# Patient Record
Sex: FEMALE | Race: WHITE | Employment: OTHER | URBAN - NONMETROPOLITAN AREA
[De-identification: names, ages, dates, MRNs, and addresses within clinical notes are randomized per-mention and may not be internally consistent; named-entity substitution may affect disease eponyms.]

---

## 2017-01-29 RX ORDER — POTASSIUM CHLORIDE 1500 MG/1
TABLET, EXTENDED RELEASE ORAL
Qty: 180 TABLET | Refills: 1 | Status: SHIPPED | OUTPATIENT
Start: 2017-01-29 | End: 2017-08-03 | Stop reason: SDUPTHER

## 2017-01-29 RX ORDER — POLYETHYLENE GLYCOL 3350 17 G/17G
POWDER, FOR SOLUTION ORAL
Qty: 1054 G | Refills: 1 | Status: SHIPPED | OUTPATIENT
Start: 2017-01-29 | End: 2017-09-01 | Stop reason: SDUPTHER

## 2017-03-16 ENCOUNTER — OFFICE VISIT (OUTPATIENT)
Dept: PRIMARY CARE CLINIC | Age: 77
End: 2017-03-16
Payer: MEDICARE

## 2017-03-16 VITALS
HEART RATE: 86 BPM | TEMPERATURE: 97 F | BODY MASS INDEX: 35 KG/M2 | WEIGHT: 205 LBS | OXYGEN SATURATION: 97 % | DIASTOLIC BLOOD PRESSURE: 64 MMHG | HEIGHT: 64 IN | SYSTOLIC BLOOD PRESSURE: 112 MMHG | RESPIRATION RATE: 20 BRPM

## 2017-03-16 DIAGNOSIS — Z23 NEED FOR PNEUMOCOCCAL VACCINATION: ICD-10-CM

## 2017-03-16 DIAGNOSIS — I10 ESSENTIAL HYPERTENSION: Primary | ICD-10-CM

## 2017-03-16 DIAGNOSIS — E78.5 HYPERLIPIDEMIA, UNSPECIFIED HYPERLIPIDEMIA TYPE: ICD-10-CM

## 2017-03-16 DIAGNOSIS — Z12.31 SCREENING MAMMOGRAM, ENCOUNTER FOR: ICD-10-CM

## 2017-03-16 PROCEDURE — 99214 OFFICE O/P EST MOD 30 MIN: CPT | Performed by: FAMILY MEDICINE

## 2017-03-24 PROBLEM — E78.5 HYPERLIPIDEMIA: Status: ACTIVE | Noted: 2017-03-24

## 2017-03-24 ASSESSMENT — ENCOUNTER SYMPTOMS
VOMITING: 0
WHEEZING: 0
COLOR CHANGE: 0
DIARRHEA: 0
COUGH: 0
BACK PAIN: 0
NAUSEA: 0
EYE DISCHARGE: 0
ABDOMINAL PAIN: 0

## 2017-04-03 RX ORDER — INDAPAMIDE 2.5 MG/1
TABLET, FILM COATED ORAL
Qty: 60 TABLET | Refills: 5 | Status: SHIPPED | OUTPATIENT
Start: 2017-04-03 | End: 2017-10-05 | Stop reason: SDUPTHER

## 2017-04-03 RX ORDER — CITALOPRAM 40 MG/1
TABLET ORAL
Qty: 90 TABLET | Refills: 0 | Status: SHIPPED | OUTPATIENT
Start: 2017-04-03 | End: 2017-05-03 | Stop reason: SDUPTHER

## 2017-04-03 RX ORDER — MELOXICAM 15 MG/1
TABLET ORAL
Qty: 90 TABLET | Refills: 2 | Status: SHIPPED | OUTPATIENT
Start: 2017-04-03 | End: 2017-08-03 | Stop reason: SDUPTHER

## 2017-05-03 DIAGNOSIS — F41.0 PANIC ATTACKS: ICD-10-CM

## 2017-05-03 RX ORDER — CITALOPRAM 40 MG/1
TABLET ORAL
Qty: 90 TABLET | Refills: 2 | Status: SHIPPED | OUTPATIENT
Start: 2017-05-03 | End: 2018-07-30 | Stop reason: SDUPTHER

## 2017-06-01 RX ORDER — ZOLPIDEM TARTRATE 10 MG/1
TABLET ORAL
Qty: 30 TABLET | Refills: 0 | Status: SHIPPED | OUTPATIENT
Start: 2017-06-01 | End: 2017-07-28 | Stop reason: SDUPTHER

## 2017-06-30 DIAGNOSIS — N39.41 URGE INCONTINENCE: ICD-10-CM

## 2017-06-30 RX ORDER — MIRABEGRON 50 MG/1
TABLET, FILM COATED, EXTENDED RELEASE ORAL
Qty: 30 TABLET | Refills: 3 | OUTPATIENT
Start: 2017-06-30

## 2017-07-05 DIAGNOSIS — J30.2 SEASONAL ALLERGIES: ICD-10-CM

## 2017-07-06 RX ORDER — FLUTICASONE PROPIONATE 50 MCG
SPRAY, SUSPENSION (ML) NASAL
Qty: 1 BOTTLE | Refills: 2 | Status: SHIPPED | OUTPATIENT
Start: 2017-07-06 | End: 2017-12-08 | Stop reason: SDUPTHER

## 2017-07-28 RX ORDER — ZOLPIDEM TARTRATE 10 MG/1
TABLET ORAL
Qty: 30 TABLET | Refills: 0 | Status: SHIPPED | OUTPATIENT
Start: 2017-07-28 | End: 2017-10-01 | Stop reason: SDUPTHER

## 2017-08-03 RX ORDER — MELOXICAM 15 MG/1
TABLET ORAL
Qty: 90 TABLET | Refills: 2 | Status: SHIPPED | OUTPATIENT
Start: 2017-08-03 | End: 2018-08-28 | Stop reason: SDUPTHER

## 2017-08-03 RX ORDER — POTASSIUM CHLORIDE 1500 MG/1
TABLET, EXTENDED RELEASE ORAL
Qty: 180 TABLET | Refills: 1 | Status: SHIPPED | OUTPATIENT
Start: 2017-08-03 | End: 2018-01-29 | Stop reason: SDUPTHER

## 2017-08-14 ENCOUNTER — NURSE ONLY (OUTPATIENT)
Dept: PRIMARY CARE CLINIC | Age: 77
End: 2017-08-14
Payer: MEDICARE

## 2017-08-14 DIAGNOSIS — L75.0 URINARY BODY ODOR: Primary | ICD-10-CM

## 2017-08-14 DIAGNOSIS — R35.0 FREQUENT URINATION: ICD-10-CM

## 2017-08-14 LAB
APPEARANCE FLUID: ABNORMAL
BILIRUBIN, POC: NEGATIVE
BLOOD URINE, POC: ABNORMAL
CLARITY, POC: ABNORMAL
COLOR, POC: ABNORMAL
GLUCOSE URINE, POC: NEGATIVE
KETONES, POC: NEGATIVE
LEUKOCYTE EST, POC: ABNORMAL
NITRITE, POC: POSITIVE
PH, POC: 6.5
PROTEIN, POC: NEGATIVE
SPECIFIC GRAVITY, POC: 1.01
UROBILINOGEN, POC: 0.2

## 2017-08-14 PROCEDURE — 81002 URINALYSIS NONAUTO W/O SCOPE: CPT | Performed by: FAMILY MEDICINE

## 2017-08-14 RX ORDER — CIPROFLOXACIN 500 MG/1
500 TABLET, FILM COATED ORAL 2 TIMES DAILY
Qty: 20 TABLET | Refills: 0 | Status: SHIPPED | OUTPATIENT
Start: 2017-08-14 | End: 2017-08-24

## 2017-08-16 LAB
ORGANISM: ABNORMAL
URINE CULTURE, ROUTINE: ABNORMAL
URINE CULTURE, ROUTINE: ABNORMAL

## 2017-09-01 RX ORDER — POLYETHYLENE GLYCOL 3350 17 G/17G
POWDER, FOR SOLUTION ORAL
Qty: 1054 G | Refills: 1 | Status: SHIPPED | OUTPATIENT
Start: 2017-09-01 | End: 2018-09-19 | Stop reason: SDUPTHER

## 2017-09-18 ENCOUNTER — OFFICE VISIT (OUTPATIENT)
Dept: PRIMARY CARE CLINIC | Age: 77
End: 2017-09-18
Payer: MEDICARE

## 2017-09-18 VITALS
TEMPERATURE: 96.6 F | WEIGHT: 198.6 LBS | DIASTOLIC BLOOD PRESSURE: 75 MMHG | HEIGHT: 63 IN | SYSTOLIC BLOOD PRESSURE: 139 MMHG | RESPIRATION RATE: 20 BRPM | BODY MASS INDEX: 35.19 KG/M2 | HEART RATE: 78 BPM

## 2017-09-18 DIAGNOSIS — R82.90 MALODOROUS URINE: ICD-10-CM

## 2017-09-18 DIAGNOSIS — I10 ESSENTIAL HYPERTENSION: Primary | ICD-10-CM

## 2017-09-18 DIAGNOSIS — R31.9 BLOOD IN URINE: ICD-10-CM

## 2017-09-18 DIAGNOSIS — L75.0 URINARY BODY ODOR: ICD-10-CM

## 2017-09-18 LAB
BILIRUBIN, POC: ABNORMAL
BLOOD URINE, POC: ABNORMAL
CLARITY, POC: ABNORMAL
COLOR, POC: ABNORMAL
GLUCOSE URINE, POC: ABNORMAL
KETONES, POC: ABNORMAL
LEUKOCYTE EST, POC: ABNORMAL
NITRITE, POC: POSITIVE
PH, POC: 5
PROTEIN, POC: ABNORMAL
SPECIFIC GRAVITY, POC: 1.02
UROBILINOGEN, POC: 0.2

## 2017-09-18 PROCEDURE — 81002 URINALYSIS NONAUTO W/O SCOPE: CPT | Performed by: FAMILY MEDICINE

## 2017-09-18 PROCEDURE — 99214 OFFICE O/P EST MOD 30 MIN: CPT | Performed by: FAMILY MEDICINE

## 2017-09-18 RX ORDER — CIPROFLOXACIN 500 MG/1
500 TABLET, FILM COATED ORAL 2 TIMES DAILY
Qty: 20 TABLET | Refills: 0 | Status: SHIPPED | OUTPATIENT
Start: 2017-09-18 | End: 2017-09-28

## 2017-09-18 ASSESSMENT — ENCOUNTER SYMPTOMS
WHEEZING: 0
DIARRHEA: 0
COUGH: 0
COLOR CHANGE: 0
BACK PAIN: 0
EYE DISCHARGE: 0
VOMITING: 0
ABDOMINAL PAIN: 0
NAUSEA: 0

## 2017-09-20 LAB
ORGANISM: ABNORMAL
URINE CULTURE, ROUTINE: ABNORMAL
URINE CULTURE, ROUTINE: ABNORMAL

## 2017-09-20 RX ORDER — NITROFURANTOIN 25; 75 MG/1; MG/1
100 CAPSULE ORAL 2 TIMES DAILY
Qty: 20 CAPSULE | Refills: 0 | Status: SHIPPED | OUTPATIENT
Start: 2017-09-20 | End: 2017-09-30

## 2017-10-02 RX ORDER — LOSARTAN POTASSIUM AND HYDROCHLOROTHIAZIDE 25; 100 MG/1; MG/1
TABLET ORAL
Qty: 90 TABLET | Refills: 3 | Status: SHIPPED | OUTPATIENT
Start: 2017-10-02 | End: 2018-09-29 | Stop reason: SDUPTHER

## 2017-10-02 RX ORDER — DULOXETIN HYDROCHLORIDE 60 MG/1
CAPSULE, DELAYED RELEASE ORAL
Qty: 90 CAPSULE | Refills: 4 | Status: SHIPPED | OUTPATIENT
Start: 2017-10-02 | End: 2018-12-31 | Stop reason: SDUPTHER

## 2017-10-02 RX ORDER — ZOLPIDEM TARTRATE 10 MG/1
TABLET ORAL
Qty: 30 TABLET | Refills: 0 | Status: SHIPPED | OUTPATIENT
Start: 2017-10-02 | End: 2017-12-01 | Stop reason: SDUPTHER

## 2017-10-04 ENCOUNTER — NURSE ONLY (OUTPATIENT)
Dept: PRIMARY CARE CLINIC | Age: 77
End: 2017-10-04
Payer: MEDICARE

## 2017-10-04 ENCOUNTER — TELEPHONE (OUTPATIENT)
Dept: PRIMARY CARE CLINIC | Age: 77
End: 2017-10-04

## 2017-10-04 DIAGNOSIS — Z09 FOLLOW-UP EXAM: Primary | ICD-10-CM

## 2017-10-04 DIAGNOSIS — F41.0 PANIC ATTACKS: ICD-10-CM

## 2017-10-04 LAB
BILIRUBIN, POC: NORMAL
BLOOD URINE, POC: NORMAL
CLARITY, POC: CLEAR
COLOR, POC: NORMAL
GLUCOSE URINE, POC: NORMAL
KETONES, POC: NORMAL
LEUKOCYTE EST, POC: NORMAL
NITRITE, POC: NORMAL
PH, POC: 6
PROTEIN, POC: NORMAL
SPECIFIC GRAVITY, POC: 1.01
UROBILINOGEN, POC: 0.2

## 2017-10-04 PROCEDURE — 81002 URINALYSIS NONAUTO W/O SCOPE: CPT | Performed by: FAMILY MEDICINE

## 2017-10-04 RX ORDER — ALPRAZOLAM 0.5 MG/1
TABLET ORAL
Qty: 90 TABLET | Refills: 0 | Status: SHIPPED | OUTPATIENT
Start: 2017-10-04 | End: 2018-05-31 | Stop reason: SDUPTHER

## 2017-10-04 NOTE — TELEPHONE ENCOUNTER
Called patient to let her know that her Xanax was called in but she would need to stop by the office to sign a medication agreement.

## 2017-10-04 NOTE — TELEPHONE ENCOUNTER
Patient called stating that she needs a refill on her Xanax. She states that her other meds were filled but this wasn't.

## 2017-10-05 RX ORDER — INDAPAMIDE 2.5 MG/1
2.5 TABLET, FILM COATED ORAL DAILY
Qty: 30 TABLET | Refills: 5 | Status: SHIPPED | OUTPATIENT
Start: 2017-10-05 | End: 2017-10-25 | Stop reason: SDUPTHER

## 2017-10-25 ENCOUNTER — TELEPHONE (OUTPATIENT)
Dept: PRIMARY CARE CLINIC | Age: 77
End: 2017-10-25

## 2017-10-25 RX ORDER — INDAPAMIDE 2.5 MG/1
2.5 TABLET, FILM COATED ORAL 2 TIMES DAILY
Qty: 60 TABLET | Refills: 3 | Status: SHIPPED | OUTPATIENT
Start: 2017-10-25 | End: 2018-01-05 | Stop reason: SDUPTHER

## 2017-12-01 DIAGNOSIS — G89.29 CHRONIC BACK PAIN: ICD-10-CM

## 2017-12-01 DIAGNOSIS — M54.9 CHRONIC BACK PAIN: ICD-10-CM

## 2017-12-01 RX ORDER — ZOLPIDEM TARTRATE 10 MG/1
TABLET ORAL
Qty: 30 TABLET | Refills: 0 | Status: SHIPPED | OUTPATIENT
Start: 2017-12-01 | End: 2018-01-29 | Stop reason: SDUPTHER

## 2017-12-04 RX ORDER — AMLODIPINE BESYLATE 10 MG/1
TABLET ORAL
Qty: 90 TABLET | Refills: 3 | Status: SHIPPED | OUTPATIENT
Start: 2017-12-04 | End: 2018-11-29 | Stop reason: SDUPTHER

## 2017-12-04 RX ORDER — LOVASTATIN 40 MG/1
TABLET ORAL
Qty: 90 TABLET | Refills: 3 | Status: SHIPPED | OUTPATIENT
Start: 2017-12-04 | End: 2018-03-06 | Stop reason: SDUPTHER

## 2017-12-08 RX ORDER — FLUTICASONE PROPIONATE 50 MCG
SPRAY, SUSPENSION (ML) NASAL
Qty: 1 BOTTLE | Refills: 2 | Status: SHIPPED | OUTPATIENT
Start: 2017-12-08 | End: 2018-12-29 | Stop reason: SDUPTHER

## 2018-01-08 RX ORDER — INDAPAMIDE 2.5 MG/1
2.5 TABLET, FILM COATED ORAL 2 TIMES DAILY
Qty: 180 TABLET | Refills: 3 | Status: SHIPPED | OUTPATIENT
Start: 2018-01-08 | End: 2019-02-22 | Stop reason: SDUPTHER

## 2018-01-29 RX ORDER — POTASSIUM CHLORIDE 1500 MG/1
TABLET, EXTENDED RELEASE ORAL
Qty: 180 TABLET | Refills: 1 | Status: SHIPPED | OUTPATIENT
Start: 2018-01-29 | End: 2018-07-29 | Stop reason: SDUPTHER

## 2018-01-29 RX ORDER — VERAPAMIL HYDROCHLORIDE 180 MG/1
CAPSULE, EXTENDED RELEASE ORAL
Qty: 180 CAPSULE | Refills: 2 | Status: SHIPPED | OUTPATIENT
Start: 2018-01-29 | End: 2018-08-17 | Stop reason: SDUPTHER

## 2018-01-29 RX ORDER — ZOLPIDEM TARTRATE 10 MG/1
TABLET ORAL
Qty: 30 TABLET | Refills: 0 | Status: SHIPPED | OUTPATIENT
Start: 2018-01-29 | End: 2018-02-28 | Stop reason: SDUPTHER

## 2018-02-28 RX ORDER — ZOLPIDEM TARTRATE 10 MG/1
TABLET ORAL
Qty: 30 TABLET | Refills: 0 | Status: SHIPPED | OUTPATIENT
Start: 2018-02-28 | End: 2018-04-02 | Stop reason: SDUPTHER

## 2018-03-06 RX ORDER — ATORVASTATIN CALCIUM 40 MG/1
40 TABLET, FILM COATED ORAL DAILY
Qty: 30 TABLET | Refills: 3 | Status: SHIPPED | OUTPATIENT
Start: 2018-03-06 | End: 2018-06-29 | Stop reason: SDUPTHER

## 2018-03-06 RX ORDER — LOVASTATIN 40 MG/1
TABLET ORAL
Qty: 90 TABLET | Refills: 3 | Status: SHIPPED | OUTPATIENT
Start: 2018-03-06 | End: 2018-03-06 | Stop reason: CLARIF

## 2018-03-19 ENCOUNTER — OFFICE VISIT (OUTPATIENT)
Dept: PRIMARY CARE CLINIC | Age: 78
End: 2018-03-19
Payer: MEDICARE

## 2018-03-19 VITALS
RESPIRATION RATE: 20 BRPM | HEART RATE: 78 BPM | TEMPERATURE: 96.1 F | DIASTOLIC BLOOD PRESSURE: 74 MMHG | BODY MASS INDEX: 35.79 KG/M2 | WEIGHT: 202 LBS | SYSTOLIC BLOOD PRESSURE: 136 MMHG | OXYGEN SATURATION: 98 % | HEIGHT: 63 IN

## 2018-03-19 DIAGNOSIS — I10 ESSENTIAL HYPERTENSION: Primary | ICD-10-CM

## 2018-03-19 DIAGNOSIS — F51.01 PRIMARY INSOMNIA: ICD-10-CM

## 2018-03-19 DIAGNOSIS — Z23 NEED FOR PNEUMOCOCCAL VACCINATION: ICD-10-CM

## 2018-03-19 DIAGNOSIS — R06.02 SHORTNESS OF BREATH ON EXERTION: ICD-10-CM

## 2018-03-19 PROCEDURE — G0009 ADMIN PNEUMOCOCCAL VACCINE: HCPCS | Performed by: FAMILY MEDICINE

## 2018-03-19 PROCEDURE — 1090F PRES/ABSN URINE INCON ASSESS: CPT | Performed by: FAMILY MEDICINE

## 2018-03-19 PROCEDURE — G8482 FLU IMMUNIZE ORDER/ADMIN: HCPCS | Performed by: FAMILY MEDICINE

## 2018-03-19 PROCEDURE — 4040F PNEUMOC VAC/ADMIN/RCVD: CPT | Performed by: FAMILY MEDICINE

## 2018-03-19 PROCEDURE — 1036F TOBACCO NON-USER: CPT | Performed by: FAMILY MEDICINE

## 2018-03-19 PROCEDURE — 90670 PCV13 VACCINE IM: CPT | Performed by: FAMILY MEDICINE

## 2018-03-19 PROCEDURE — G8417 CALC BMI ABV UP PARAM F/U: HCPCS | Performed by: FAMILY MEDICINE

## 2018-03-19 PROCEDURE — G8599 NO ASA/ANTIPLAT THER USE RNG: HCPCS | Performed by: FAMILY MEDICINE

## 2018-03-19 PROCEDURE — 99214 OFFICE O/P EST MOD 30 MIN: CPT | Performed by: FAMILY MEDICINE

## 2018-03-19 PROCEDURE — G8427 DOCREV CUR MEDS BY ELIG CLIN: HCPCS | Performed by: FAMILY MEDICINE

## 2018-03-19 PROCEDURE — 1123F ACP DISCUSS/DSCN MKR DOCD: CPT | Performed by: FAMILY MEDICINE

## 2018-03-19 PROCEDURE — G8399 PT W/DXA RESULTS DOCUMENT: HCPCS | Performed by: FAMILY MEDICINE

## 2018-03-19 RX ORDER — GABAPENTIN 100 MG/1
200 CAPSULE ORAL 2 TIMES DAILY
COMMUNITY

## 2018-03-19 ASSESSMENT — ENCOUNTER SYMPTOMS
BACK PAIN: 0
VOMITING: 0
EYE DISCHARGE: 0
WHEEZING: 0
ABDOMINAL PAIN: 0
NAUSEA: 0
DIARRHEA: 0
COLOR CHANGE: 0
COUGH: 0

## 2018-03-19 ASSESSMENT — PATIENT HEALTH QUESTIONNAIRE - PHQ9
1. LITTLE INTEREST OR PLEASURE IN DOING THINGS: 0
SUM OF ALL RESPONSES TO PHQ QUESTIONS 1-9: 0
2. FEELING DOWN, DEPRESSED OR HOPELESS: 0
SUM OF ALL RESPONSES TO PHQ9 QUESTIONS 1 & 2: 0

## 2018-03-19 NOTE — PROGRESS NOTES
conjugate vaccine 13-valent)    Shortness of breath on exertion        PLAN:    Continue current medications. Follow-up in 6 months for Medicare annual wellness and checkup unless needed sooner. Prevnar given in office today.

## 2018-03-28 ENCOUNTER — NURSE ONLY (OUTPATIENT)
Dept: PRIMARY CARE CLINIC | Age: 78
End: 2018-03-28
Payer: MEDICARE

## 2018-03-28 DIAGNOSIS — I25.119 CORONARY ARTERY DISEASE WITH ANGINA PECTORIS, UNSPECIFIED VESSEL OR LESION TYPE, UNSPECIFIED WHETHER NATIVE OR TRANSPLANTED HEART (HCC): ICD-10-CM

## 2018-03-28 DIAGNOSIS — I10 ESSENTIAL HYPERTENSION: ICD-10-CM

## 2018-03-28 DIAGNOSIS — E78.00 PURE HYPERCHOLESTEROLEMIA: Primary | ICD-10-CM

## 2018-03-28 LAB
ALBUMIN SERPL-MCNC: 3.9 G/DL (ref 3.5–5.2)
ALP BLD-CCNC: 71 U/L (ref 35–104)
ALT SERPL-CCNC: 9 U/L (ref 5–33)
ANION GAP SERPL CALCULATED.3IONS-SCNC: 14 MMOL/L (ref 7–19)
AST SERPL-CCNC: 14 U/L (ref 5–32)
BILIRUB SERPL-MCNC: 0.3 MG/DL (ref 0.2–1.2)
BUN BLDV-MCNC: 15 MG/DL (ref 8–23)
CALCIUM SERPL-MCNC: 9.6 MG/DL (ref 8.8–10.2)
CHLORIDE BLD-SCNC: 100 MMOL/L (ref 98–111)
CHOLESTEROL, TOTAL: 166 MG/DL (ref 160–199)
CO2: 26 MMOL/L (ref 22–29)
CREAT SERPL-MCNC: 0.5 MG/DL (ref 0.5–0.9)
GFR NON-AFRICAN AMERICAN: >60
GLUCOSE BLD-MCNC: 99 MG/DL (ref 74–109)
HCT VFR BLD CALC: 41.8 % (ref 37–47)
HDLC SERPL-MCNC: 70 MG/DL (ref 65–121)
HEMOGLOBIN: 13.5 G/DL (ref 12–16)
LDL CHOLESTEROL CALCULATED: 81 MG/DL
MCH RBC QN AUTO: 29 PG (ref 27–31)
MCHC RBC AUTO-ENTMCNC: 32.3 G/DL (ref 33–37)
MCV RBC AUTO: 89.7 FL (ref 81–99)
PDW BLD-RTO: 14.4 % (ref 11.5–14.5)
PLATELET # BLD: 360 K/UL (ref 130–400)
PMV BLD AUTO: 10 FL (ref 9.4–12.3)
POTASSIUM SERPL-SCNC: 4.2 MMOL/L (ref 3.5–5)
RBC # BLD: 4.66 M/UL (ref 4.2–5.4)
SODIUM BLD-SCNC: 140 MMOL/L (ref 136–145)
TOTAL PROTEIN: 6.8 G/DL (ref 6.6–8.7)
TRIGL SERPL-MCNC: 77 MG/DL (ref 0–149)
WBC # BLD: 5.4 K/UL (ref 4.8–10.8)

## 2018-03-28 PROCEDURE — 36415 COLL VENOUS BLD VENIPUNCTURE: CPT | Performed by: FAMILY MEDICINE

## 2018-04-02 RX ORDER — ZOLPIDEM TARTRATE 10 MG/1
TABLET ORAL
Qty: 30 TABLET | Refills: 0 | Status: SHIPPED | OUTPATIENT
Start: 2018-04-02 | End: 2018-04-30 | Stop reason: SDUPTHER

## 2018-04-30 RX ORDER — ZOLPIDEM TARTRATE 10 MG/1
TABLET ORAL
Qty: 30 TABLET | Refills: 0 | Status: SHIPPED | OUTPATIENT
Start: 2018-04-30 | End: 2018-05-31 | Stop reason: SDUPTHER

## 2018-06-29 DIAGNOSIS — F51.01 PRIMARY INSOMNIA: Primary | ICD-10-CM

## 2018-06-29 RX ORDER — ZOLPIDEM TARTRATE 10 MG/1
TABLET ORAL
Qty: 30 TABLET | Refills: 0 | Status: SHIPPED | OUTPATIENT
Start: 2018-06-29 | End: 2018-07-29 | Stop reason: SDUPTHER

## 2018-06-29 RX ORDER — MIRABEGRON 25 MG/1
1 TABLET, FILM COATED, EXTENDED RELEASE ORAL DAILY
Qty: 30 TABLET | Refills: 3 | Status: SHIPPED | OUTPATIENT
Start: 2018-06-29 | End: 2018-10-24 | Stop reason: SDUPTHER

## 2018-06-29 RX ORDER — ATORVASTATIN CALCIUM 40 MG/1
40 TABLET, FILM COATED ORAL DAILY
Qty: 30 TABLET | Refills: 3 | Status: SHIPPED | OUTPATIENT
Start: 2018-06-29 | End: 2018-10-29 | Stop reason: SDUPTHER

## 2018-07-29 DIAGNOSIS — F51.01 PRIMARY INSOMNIA: ICD-10-CM

## 2018-07-30 DIAGNOSIS — F41.0 PANIC ATTACKS: ICD-10-CM

## 2018-07-30 RX ORDER — CITALOPRAM 40 MG/1
TABLET ORAL
Qty: 90 TABLET | Refills: 2 | Status: SHIPPED | OUTPATIENT
Start: 2018-07-30 | End: 2019-10-27 | Stop reason: SDUPTHER

## 2018-07-30 RX ORDER — POTASSIUM CHLORIDE 1500 MG/1
TABLET, EXTENDED RELEASE ORAL
Qty: 180 TABLET | Refills: 1 | Status: SHIPPED | OUTPATIENT
Start: 2018-07-30 | End: 2019-01-28 | Stop reason: SDUPTHER

## 2018-07-31 RX ORDER — ZOLPIDEM TARTRATE 10 MG/1
TABLET ORAL
Qty: 30 TABLET | Refills: 0 | Status: SHIPPED | OUTPATIENT
Start: 2018-07-31 | End: 2018-08-31 | Stop reason: SDUPTHER

## 2018-08-07 ENCOUNTER — TELEPHONE (OUTPATIENT)
Dept: PRIMARY CARE CLINIC | Age: 78
End: 2018-08-07

## 2018-08-07 NOTE — TELEPHONE ENCOUNTER
Pt called stating that Verapamil must be on back order. Not able to get anywhere. Do you want to change med?

## 2018-08-17 ENCOUNTER — TELEPHONE (OUTPATIENT)
Dept: PRIMARY CARE CLINIC | Age: 78
End: 2018-08-17

## 2018-08-17 RX ORDER — VERAPAMIL HYDROCHLORIDE 240 MG/1
240 CAPSULE, EXTENDED RELEASE ORAL NIGHTLY
Qty: 30 CAPSULE | Refills: 5 | Status: SHIPPED | OUTPATIENT
Start: 2018-08-17 | End: 2018-08-23 | Stop reason: SDUPTHER

## 2018-08-23 RX ORDER — VERAPAMIL HYDROCHLORIDE 240 MG/1
240 CAPSULE, EXTENDED RELEASE ORAL 2 TIMES DAILY
Qty: 60 CAPSULE | Refills: 3 | Status: SHIPPED | OUTPATIENT
Start: 2018-08-23 | End: 2018-12-29 | Stop reason: SDUPTHER

## 2018-08-28 RX ORDER — MELOXICAM 15 MG/1
TABLET ORAL
Qty: 90 TABLET | Refills: 2 | Status: SHIPPED | OUTPATIENT
Start: 2018-08-28 | End: 2019-06-26 | Stop reason: SDUPTHER

## 2018-08-31 DIAGNOSIS — F51.01 PRIMARY INSOMNIA: ICD-10-CM

## 2018-08-31 RX ORDER — ZOLPIDEM TARTRATE 10 MG/1
TABLET ORAL
Qty: 30 TABLET | Refills: 0 | Status: SHIPPED | OUTPATIENT
Start: 2018-08-31 | End: 2018-09-20 | Stop reason: SDUPTHER

## 2018-09-19 ENCOUNTER — OFFICE VISIT (OUTPATIENT)
Dept: PRIMARY CARE CLINIC | Age: 78
End: 2018-09-19
Payer: MEDICARE

## 2018-09-19 VITALS
RESPIRATION RATE: 22 BRPM | OXYGEN SATURATION: 97 % | BODY MASS INDEX: 35.44 KG/M2 | DIASTOLIC BLOOD PRESSURE: 66 MMHG | WEIGHT: 200 LBS | HEART RATE: 68 BPM | SYSTOLIC BLOOD PRESSURE: 130 MMHG | TEMPERATURE: 96.4 F | HEIGHT: 63 IN

## 2018-09-19 DIAGNOSIS — I10 ESSENTIAL HYPERTENSION: ICD-10-CM

## 2018-09-19 DIAGNOSIS — F51.01 PRIMARY INSOMNIA: ICD-10-CM

## 2018-09-19 DIAGNOSIS — Z23 NEED FOR INFLUENZA VACCINATION: Primary | ICD-10-CM

## 2018-09-19 DIAGNOSIS — Z00.00 ROUTINE GENERAL MEDICAL EXAMINATION AT A HEALTH CARE FACILITY: ICD-10-CM

## 2018-09-19 PROCEDURE — 99213 OFFICE O/P EST LOW 20 MIN: CPT | Performed by: FAMILY MEDICINE

## 2018-09-19 PROCEDURE — G8427 DOCREV CUR MEDS BY ELIG CLIN: HCPCS | Performed by: FAMILY MEDICINE

## 2018-09-19 PROCEDURE — G8417 CALC BMI ABV UP PARAM F/U: HCPCS | Performed by: FAMILY MEDICINE

## 2018-09-19 PROCEDURE — 4040F PNEUMOC VAC/ADMIN/RCVD: CPT | Performed by: FAMILY MEDICINE

## 2018-09-19 PROCEDURE — 1036F TOBACCO NON-USER: CPT | Performed by: FAMILY MEDICINE

## 2018-09-19 PROCEDURE — G8599 NO ASA/ANTIPLAT THER USE RNG: HCPCS | Performed by: FAMILY MEDICINE

## 2018-09-19 PROCEDURE — G0439 PPPS, SUBSEQ VISIT: HCPCS | Performed by: FAMILY MEDICINE

## 2018-09-19 PROCEDURE — 1123F ACP DISCUSS/DSCN MKR DOCD: CPT | Performed by: FAMILY MEDICINE

## 2018-09-19 PROCEDURE — 1101F PT FALLS ASSESS-DOCD LE1/YR: CPT | Performed by: FAMILY MEDICINE

## 2018-09-19 PROCEDURE — 90688 IIV4 VACCINE SPLT 0.5 ML IM: CPT | Performed by: FAMILY MEDICINE

## 2018-09-19 PROCEDURE — G0008 ADMIN INFLUENZA VIRUS VAC: HCPCS | Performed by: FAMILY MEDICINE

## 2018-09-19 PROCEDURE — G8399 PT W/DXA RESULTS DOCUMENT: HCPCS | Performed by: FAMILY MEDICINE

## 2018-09-19 PROCEDURE — 1090F PRES/ABSN URINE INCON ASSESS: CPT | Performed by: FAMILY MEDICINE

## 2018-09-19 ASSESSMENT — PATIENT HEALTH QUESTIONNAIRE - PHQ9
SUM OF ALL RESPONSES TO PHQ QUESTIONS 1-9: 0
SUM OF ALL RESPONSES TO PHQ QUESTIONS 1-9: 0

## 2018-09-19 ASSESSMENT — ANXIETY QUESTIONNAIRES: GAD7 TOTAL SCORE: 0

## 2018-09-19 ASSESSMENT — LIFESTYLE VARIABLES: HOW OFTEN DO YOU HAVE A DRINK CONTAINING ALCOHOL: 0

## 2018-09-20 RX ORDER — ZOLPIDEM TARTRATE 10 MG/1
TABLET ORAL
Qty: 30 TABLET | Refills: 2 | Status: SHIPPED | OUTPATIENT
Start: 2018-09-20 | End: 2018-12-29 | Stop reason: SDUPTHER

## 2018-09-20 ASSESSMENT — ENCOUNTER SYMPTOMS
EYE DISCHARGE: 0
DIARRHEA: 0
COLOR CHANGE: 0
COUGH: 0
BACK PAIN: 0
VOMITING: 0
NAUSEA: 0
WHEEZING: 0
ABDOMINAL PAIN: 0

## 2018-09-20 NOTE — PATIENT INSTRUCTIONS
Personalized Preventive Plan for [place] - 9/19/2018  Medicare offers a range of preventive health benefits. Some of the tests and screenings are paid in full while other may be subject to a deductible, co-insurance, and/or copay. Some of these benefits include a comprehensive review of your medical history including lifestyle, illnesses that may run in your family, and various assessments and screenings as appropriate. After reviewing your medical record and screening and assessments performed today your provider may have ordered immunizations, labs, imaging, and/or referrals for you. A list of these orders (if applicable) as well as your Preventive Care list are included within your After Visit Summary for your review. Other Preventive Recommendations:    · A preventive eye exam performed by an eye specialist is recommended every 1-2 years to screen for glaucoma; cataracts, macular degeneration, and other eye disorders. · A preventive dental visit is recommended every 6 months. · Try to get at least 150 minutes of exercise per week or 10,000 steps per day on a pedometer . · Order or download the FREE \"Exercise & Physical Activity: Your Everyday Guide\" from [employer]. Call 6-752.338.9545 or search [employer] online. · You need 3991-7169 mg of calcium and 2294-5814 IU of vitamin D per day. It is possible to meet your calcium requirement with diet alone, but a vitamin D supplement is usually necessary to meet this goal.  · When exposed to the sun, use a sunscreen that protects against both UVA and UVB radiation with an SPF of 30 or greater. Reapply every 2 to 3 hours or after sweating, drying off with a towel, or swimming. · Always wear a seat belt when traveling in a car. Always wear a helmet when riding a bicycle or motorcycle.

## 2018-09-20 NOTE — PROGRESS NOTES
SUBJECTIVE:    Patient ID: Aubrie Chaidez is a 66 y.o. female. HPI:   Patient presents today for Medicare annual wellness but also needs a refill on her insomnia medication. She takes Ambien. She states that this is working well for her and she denies any side effects. She has been on this for several years. She states that it works well for her and this is the only thing that she has found that works. She also is here for follow-up on her high blood pressure. She is taking her medications as prescribed. She recently had issues with verapamil being back ordered and the pharmacy could not get it however they have changed it to the tablets and her blood pressure has been better since she is started back on the verapamil. She denies any chest pain or palpitations today. 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 Camacho Locke found on scope    Seasonal allergies     Shortness of breath on exertion     Sneezing     Urinary frequency    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    Whooping cough     9 mos old      Current Outpatient Prescriptions   Medication Sig Dispense Refill    zolpidem (AMBIEN) 10 MG tablet 1/2 to 1 tablet nightly as needed for insomnia.  30 tablet 2    meloxicam (MOBIC) 15 MG tablet TAKE 1 TABLET BY MOUTH EVERY DAY 90 tablet 2    verapamil (VERELAN) 240 MG extended release capsule Take 1 capsule by mouth 2 times daily 60 capsule 3    KLOR-CON M20 20 MEQ extended release tablet TAKE 1 TABLET BY MOUTH TWICE A  tablet 1    citalopram (CELEXA) 40 MG tablet TAKE 1 TABLET BY MOUTH DAILY. 90 tablet 2    atorvastatin (LIPITOR) 40 MG tablet TAKE 1 TABLET BY MOUTH DAILY 30 tablet 3    MYRBETRIQ 25 MG TB24 TAKE 1 TABLET BY MOUTH DAILY 30 tablet 3    gabapentin (NEURONTIN) 100 MG capsule Take 200 mg by mouth 2 times daily.  indapamide (LOZOL) 2.5 MG tablet Take 1 tablet by mouth 2 times daily 180 tablet 3    fluticasone (FLONASE) 50 MCG/ACT nasal spray INSTILL 2 SPRAYS IN EACH NOSTRIL ONCE DAILY 1 Bottle 2    amLODIPine (NORVASC) 10 MG tablet TAKE 1 TABLET BY MOUTH DAILY 90 tablet 3    losartan-hydrochlorothiazide (HYZAAR) 100-25 MG per tablet TAKE 1 TABLET BY MOUTH DAILY. 90 tablet 3    DULoxetine (CYMBALTA) 60 MG extended release capsule TAKE ONE CAPSULE BY MOUTH DAILY 90 capsule 4    albuterol sulfate HFA (PROAIR HFA) 108 (90 BASE) MCG/ACT inhaler Inhale 2 puffs into the lungs every 6 hours as needed for Wheezing 1 Inhaler 3    polyethylene glycol (GLYCOLAX) powder DISSOLVE 1 CAPFUL (17 GRAMS) IN 8 OUNCES WATER TWICE A DAY 1054 g 1    HYDROcodone-acetaminophen (NORCO) 7.5-325 MG per tablet Take one up to three times a day as needed for arthritis flares 30 tablet 0    hydrOXYzine (ATARAX) 50 MG tablet TAKE 1 TABLET BY MOUTH NIGHTLY AS NEEDED (SLEEP). 90 tablet 3    Pediatric Multivitamins-Fl (MULT-VITAMIN/FLUORIDE PO) Take  by mouth.  calcium carbonate (OSCAL) 500 MG TABS tablet Take 500 mg by mouth daily.  fish oil-omega-3 fatty acids 1000 MG capsule Take 2 g by mouth daily. No current facility-administered medications for this visit. Allergies   Allergen Reactions    Pollen Extract      Nasal congestion       Review of Systems   Constitutional: Negative for activity change, appetite change and fever. HENT: Negative for congestion and nosebleeds. Eyes: Negative for discharge. Respiratory: Negative for cough and wheezing. Cardiovascular: Negative for chest pain and leg swelling. Gastrointestinal: Negative for abdominal pain, diarrhea, nausea and vomiting. Genitourinary: Negative for difficulty urinating, frequency and urgency. Musculoskeletal: Negative for back pain and gait problem. Skin: Negative for color change and rash. Neurological: Negative for dizziness and headaches. Hematological: Does not bruise/bleed easily. Psychiatric/Behavioral: Positive for sleep disturbance. Negative for suicidal ideas. OBJECTIVE:    Physical Exam   Constitutional: She is oriented to person, place, and time. She appears well-developed. No distress. HENT:   Head: Normocephalic and atraumatic. Neck: Normal range of motion. Neck supple. Cardiovascular: Normal rate, regular rhythm and normal heart sounds. No murmur heard. Pulmonary/Chest: Effort normal and breath sounds normal. No respiratory distress. Neurological: She is alert and oriented to person, place, and time. Skin: Skin is warm and dry. She is not diaphoretic. Psychiatric: She has a normal mood and affect. Her behavior is normal. Judgment and thought content normal.      /66 (Site: Right Upper Arm, Position: Sitting, Cuff Size: Large Adult)   Pulse 68   Temp 96.4 °F (35.8 °C) (Temporal)   Resp 22   Ht 5' 3\" (1.6 m)   Wt 200 lb (90.7 kg)   SpO2 97%   BMI 35.43 kg/m²      ASSESSMENT:    Librado Mcmahon was seen today for medicare awv, hypertension and hyperlipidemia. Diagnoses and all orders for this visit:    Need for influenza vaccination  -     INFLUENZA, QUADV, 3 YRS AND OLDER, IM, MDV, 0.5ML (Kennedy Sandoval)    Essential hypertension    Primary insomnia  -     zolpidem (AMBIEN) 10 MG tablet; 1/2 to 1 tablet nightly as needed for insomnia. Routine general medical examination at a health care facility        PLAN:    Refill Ambien today. Continue current blood pressure medication. Follow-up with us in 6 months for checkup unless needed sooner.     EMR Dragon/transcription disclaimer:  Much of this

## 2018-09-20 NOTE — PROGRESS NOTES
DAILY. Yes Alisson Hope MD   DULoxetine (CYMBALTA) 60 MG extended release capsule TAKE ONE CAPSULE BY MOUTH DAILY Yes Eusebia Claudio MD   albuterol sulfate HFA (PROAIR HFA) 108 (90 BASE) MCG/ACT inhaler Inhale 2 puffs into the lungs every 6 hours as needed for Wheezing Yes Eusebia Claudio MD   polyethylene glycol (GLYCOLAX) powder DISSOLVE 1 CAPFUL (17 GRAMS) IN 8 OUNCES WATER TWICE A DAY Yes CRISTIANE Haq   HYDROcodone-acetaminophen ([place]) 7.5-325 MG per tablet Take one up to three times a day as needed for arthritis flares Yes Danielle Mondragon MD   hydrOXYzine (ATARAX) 50 MG tablet TAKE 1 TABLET BY MOUTH NIGHTLY AS NEEDED (SLEEP). Yes CRISTIANE Alfredo - CNP   Pediatric Multivitamins-Fl (MULT-VITAMIN/FLUORIDE PO) Take  by mouth. Yes Historical Provider, MD   calcium carbonate (OSCAL) 500 MG TABS tablet Take 500 mg by mouth daily. Yes Historical Provider, MD   fish oil-omega-3 fatty acids 1000 MG capsule Take 2 g by mouth daily.     Historical Provider, MD     Past Medical History:   Diagnosis Date    Arthritis     CAD (coronary artery disease)     Colon polyps     Constipation     Diverticulosis     Gall stones     Gallstones     Hayfever     Heart palpitations     Hypertension     Insomnia     Nasal drainage     Osteoarthritis     Osteopenia 2013    T score -1.7    Panic attacks     Polyp of colon     Rectal carcinoid tumor Dec 2014    Dr. Wang Burrows found on scope    Seasonal allergies     Shortness of breath on exertion     Sneezing     Urinary frequency    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    Whooping cough     5 mos old     Past Surgical History:   Procedure Laterality Date    APPENDECTOMY      BREAST SURGERY      Biopsy x2    CATARACT REMOVAL Bilateral 4/2015,5/2015    CHOLECYSTECTOMY      COLONOSCOPY  10-    tubular adenoma removed, mild inflammation, needs repeat scope in 2014, Dr. Maximo Daley Right 04/2014     Family History   Problem Relation Age of Onset    Heart Disease Mother     COPD Mother     Heart Disease Father     High Blood Pressure Father     High Blood Pressure Sister     High Blood Pressure Brother     High Blood Pressure Sister        CareTeam (Including outside providers/suppliers regularly involved in providing care):   Patient Care Team:  Kulwant Payan MD as PCP - General (Family Medicine)    Wt Readings from Last 3 Encounters:   09/19/18 200 lb (90.7 kg)   03/19/18 202 lb (91.6 kg)   09/18/17 198 lb 9.6 oz (90.1 kg)     Vitals:    09/19/18 1056   BP: 130/66   Site: Right Upper Arm   Position: Sitting   Cuff Size: Large Adult   Pulse: 68   Resp: 22   Temp: 96.4 °F (35.8 °C)   TempSrc: Temporal   SpO2: 97%   Weight: 200 lb (90.7 kg)   Height: 5' 3\" (1.6 m)     Body mass index is 35.43 kg/m². Patient's complete Health Risk Assessment and screening values have been reviewed and are found in Flowsheets. The following problems were reviewed today and where indicated follow up appointments were made and/or referrals ordered. Positive Risk Factor Screenings with Interventions:     Health Habits/Nutrition:  Health Habits/Nutrition  Do you exercise for at least 20 minutes 2-3 times per week?: (!) No  Have you lost any weight without trying in the past 3 months?: No  Do you eat fewer than 2 meals per day?: No  Have you seen a dentist within the past year?: Yes  Body mass index is 35.43 kg/m².   Health Habits/Nutrition Interventions:  · Inadequate physical activity:  educational materials provided to promote increased physical activity    Personalized Preventive Plan   Current Health Maintenance Status  Immunization History   Administered Date(s) Administered    Influenza Virus Vaccine 09/12/2012, 10/13/2014, 09/25/2017    Influenza, Riri Duval, 3 Years and older, IM (Fluzone 3 yrs and older or Afluria 5 yrs and older) 09/19/2018    Pneumococcal 13-valent Conjugate ([id number]) 03/19/2018    Pneumococcal Polysaccharide ([id number]) 10/22/2008    Tdap (Boostrix, Adacel) 06/10/2014    Zoster Live (Zostavax) 11/14/2012        Health Maintenance   Topic Date Due    Shingles Vaccine (1 of 2 - 2 Dose Series) 01/14/2013    Potassium monitoring  03/28/2019    Creatinine monitoring  03/28/2019    DTaP/Tdap/Td vaccine (2 - Td) 06/10/2024    DEXA (modify frequency per FRAX score)  Completed    Flu vaccine  Completed    Pneumococcal low/med risk  Completed     Recommendations for Preventive Services Due: see orders and patient instructions/AVS.  .   Recommended screening schedule for the next 5-10 years is provided to the patient in written form: see Patient Instructions/AVS.

## 2018-10-01 RX ORDER — LOSARTAN POTASSIUM AND HYDROCHLOROTHIAZIDE 25; 100 MG/1; MG/1
TABLET ORAL
Qty: 90 TABLET | Refills: 3 | Status: SHIPPED | OUTPATIENT
Start: 2018-10-01 | End: 2019-06-27 | Stop reason: SDUPTHER

## 2018-10-29 RX ORDER — ATORVASTATIN CALCIUM 40 MG/1
TABLET, FILM COATED ORAL
Qty: 30 TABLET | Refills: 3 | Status: SHIPPED | OUTPATIENT
Start: 2018-10-29 | End: 2019-03-26 | Stop reason: SDUPTHER

## 2018-10-30 DIAGNOSIS — F41.0 PANIC ATTACKS: ICD-10-CM

## 2018-10-30 RX ORDER — ALPRAZOLAM 0.5 MG/1
TABLET ORAL
Qty: 90 TABLET | Refills: 0 | Status: SHIPPED | OUTPATIENT
Start: 2018-10-30 | End: 2019-01-28 | Stop reason: SDUPTHER

## 2018-10-30 RX ORDER — POLYETHYLENE GLYCOL 3350 17 G/17G
POWDER, FOR SOLUTION ORAL
Qty: 1054 G | Refills: 0 | Status: SHIPPED | OUTPATIENT
Start: 2018-10-30 | End: 2019-09-25 | Stop reason: SDUPTHER

## 2018-11-29 RX ORDER — AMLODIPINE BESYLATE 10 MG/1
10 TABLET ORAL DAILY
Qty: 90 TABLET | Refills: 3 | Status: SHIPPED | OUTPATIENT
Start: 2018-11-29 | End: 2019-11-26 | Stop reason: SDUPTHER

## 2018-12-29 DIAGNOSIS — F51.01 PRIMARY INSOMNIA: ICD-10-CM

## 2018-12-31 RX ORDER — VERAPAMIL HYDROCHLORIDE 240 MG/1
TABLET, FILM COATED, EXTENDED RELEASE ORAL
Qty: 60 TABLET | Refills: 3 | Status: SHIPPED | OUTPATIENT
Start: 2018-12-31 | End: 2019-03-22 | Stop reason: SDUPTHER

## 2018-12-31 RX ORDER — DULOXETIN HYDROCHLORIDE 60 MG/1
60 CAPSULE, DELAYED RELEASE ORAL DAILY
Qty: 90 CAPSULE | Refills: 4 | Status: SHIPPED | OUTPATIENT
Start: 2018-12-31 | End: 2020-03-24

## 2018-12-31 RX ORDER — ZOLPIDEM TARTRATE 10 MG/1
TABLET ORAL
Qty: 30 TABLET | Refills: 2 | Status: SHIPPED | OUTPATIENT
Start: 2018-12-31 | End: 2019-02-26 | Stop reason: SDUPTHER

## 2019-01-28 DIAGNOSIS — F41.0 PANIC ATTACKS: ICD-10-CM

## 2019-01-28 RX ORDER — ALPRAZOLAM 0.5 MG/1
TABLET ORAL
Qty: 90 TABLET | Refills: 0 | Status: SHIPPED | OUTPATIENT
Start: 2019-01-28 | End: 2019-06-27 | Stop reason: SDUPTHER

## 2019-01-28 RX ORDER — POTASSIUM CHLORIDE 20 MEQ/1
20 TABLET, EXTENDED RELEASE ORAL 2 TIMES DAILY
Qty: 180 TABLET | Refills: 3 | Status: SHIPPED | OUTPATIENT
Start: 2019-01-28 | End: 2020-01-24

## 2019-02-25 RX ORDER — INDAPAMIDE 2.5 MG/1
2.5 TABLET, FILM COATED ORAL 2 TIMES DAILY
Qty: 180 TABLET | Refills: 3 | Status: SHIPPED | OUTPATIENT
Start: 2019-02-25 | End: 2020-02-24

## 2019-02-26 DIAGNOSIS — F51.01 PRIMARY INSOMNIA: ICD-10-CM

## 2019-02-26 RX ORDER — ZOLPIDEM TARTRATE 10 MG/1
5 TABLET ORAL NIGHTLY PRN
Qty: 30 TABLET | Refills: 0 | Status: SHIPPED | OUTPATIENT
Start: 2019-02-26 | End: 2019-03-20 | Stop reason: SDUPTHER

## 2019-03-20 ENCOUNTER — OFFICE VISIT (OUTPATIENT)
Dept: PRIMARY CARE CLINIC | Age: 79
End: 2019-03-20
Payer: MEDICARE

## 2019-03-20 VITALS
DIASTOLIC BLOOD PRESSURE: 82 MMHG | SYSTOLIC BLOOD PRESSURE: 126 MMHG | HEART RATE: 73 BPM | WEIGHT: 193.6 LBS | TEMPERATURE: 97.4 F | HEIGHT: 63 IN | OXYGEN SATURATION: 98 % | RESPIRATION RATE: 16 BRPM | BODY MASS INDEX: 34.3 KG/M2

## 2019-03-20 DIAGNOSIS — R39.15 URINARY URGENCY: Primary | ICD-10-CM

## 2019-03-20 DIAGNOSIS — I10 ESSENTIAL HYPERTENSION: ICD-10-CM

## 2019-03-20 DIAGNOSIS — F51.01 PRIMARY INSOMNIA: ICD-10-CM

## 2019-03-20 PROCEDURE — 1036F TOBACCO NON-USER: CPT | Performed by: FAMILY MEDICINE

## 2019-03-20 PROCEDURE — 1123F ACP DISCUSS/DSCN MKR DOCD: CPT | Performed by: FAMILY MEDICINE

## 2019-03-20 PROCEDURE — G8399 PT W/DXA RESULTS DOCUMENT: HCPCS | Performed by: FAMILY MEDICINE

## 2019-03-20 PROCEDURE — G8599 NO ASA/ANTIPLAT THER USE RNG: HCPCS | Performed by: FAMILY MEDICINE

## 2019-03-20 PROCEDURE — G8417 CALC BMI ABV UP PARAM F/U: HCPCS | Performed by: FAMILY MEDICINE

## 2019-03-20 PROCEDURE — 4040F PNEUMOC VAC/ADMIN/RCVD: CPT | Performed by: FAMILY MEDICINE

## 2019-03-20 PROCEDURE — G8427 DOCREV CUR MEDS BY ELIG CLIN: HCPCS | Performed by: FAMILY MEDICINE

## 2019-03-20 PROCEDURE — 1090F PRES/ABSN URINE INCON ASSESS: CPT | Performed by: FAMILY MEDICINE

## 2019-03-20 PROCEDURE — 1101F PT FALLS ASSESS-DOCD LE1/YR: CPT | Performed by: FAMILY MEDICINE

## 2019-03-20 PROCEDURE — G8482 FLU IMMUNIZE ORDER/ADMIN: HCPCS | Performed by: FAMILY MEDICINE

## 2019-03-20 PROCEDURE — 99214 OFFICE O/P EST MOD 30 MIN: CPT | Performed by: FAMILY MEDICINE

## 2019-03-20 ASSESSMENT — PATIENT HEALTH QUESTIONNAIRE - PHQ9
2. FEELING DOWN, DEPRESSED OR HOPELESS: 0
SUM OF ALL RESPONSES TO PHQ QUESTIONS 1-9: 0
SUM OF ALL RESPONSES TO PHQ9 QUESTIONS 1 & 2: 0
1. LITTLE INTEREST OR PLEASURE IN DOING THINGS: 0
SUM OF ALL RESPONSES TO PHQ QUESTIONS 1-9: 0

## 2019-03-21 RX ORDER — ZOLPIDEM TARTRATE 10 MG/1
10 TABLET ORAL NIGHTLY PRN
Qty: 30 TABLET | Refills: 2 | Status: SHIPPED | OUTPATIENT
Start: 2019-03-21 | End: 2019-06-27 | Stop reason: SDUPTHER

## 2019-03-21 ASSESSMENT — ENCOUNTER SYMPTOMS
BACK PAIN: 0
NAUSEA: 0
ABDOMINAL PAIN: 0
WHEEZING: 0
COLOR CHANGE: 0
COUGH: 0
VOMITING: 0
EYE DISCHARGE: 0
DIARRHEA: 0

## 2019-03-22 RX ORDER — VERAPAMIL HYDROCHLORIDE 240 MG/1
240 TABLET, FILM COATED, EXTENDED RELEASE ORAL NIGHTLY
Qty: 90 TABLET | Refills: 3 | Status: SHIPPED | OUTPATIENT
Start: 2019-03-22 | End: 2019-06-03

## 2019-03-26 RX ORDER — ATORVASTATIN CALCIUM 40 MG/1
TABLET, FILM COATED ORAL
Qty: 30 TABLET | Refills: 3 | Status: SHIPPED | OUTPATIENT
Start: 2019-03-26 | End: 2019-06-22 | Stop reason: SDUPTHER

## 2019-04-05 ENCOUNTER — NURSE ONLY (OUTPATIENT)
Dept: PRIMARY CARE CLINIC | Age: 79
End: 2019-04-05
Payer: MEDICARE

## 2019-04-05 DIAGNOSIS — R82.90 ABNORMAL URINALYSIS: ICD-10-CM

## 2019-04-05 DIAGNOSIS — R39.15 URINARY URGENCY: Primary | ICD-10-CM

## 2019-04-05 LAB
BILIRUBIN, POC: ABNORMAL
BLOOD URINE, POC: ABNORMAL
CLARITY, POC: ABNORMAL
COLOR, POC: YELLOW
GLUCOSE URINE, POC: ABNORMAL
KETONES, POC: ABNORMAL
LEUKOCYTE EST, POC: ABNORMAL
NITRITE, POC: ABNORMAL
PH, POC: 7
PROTEIN, POC: ABNORMAL
SPECIFIC GRAVITY, POC: 1.01
UROBILINOGEN, POC: 0.2

## 2019-04-05 PROCEDURE — 81002 URINALYSIS NONAUTO W/O SCOPE: CPT | Performed by: FAMILY MEDICINE

## 2019-04-05 RX ORDER — SULFAMETHOXAZOLE AND TRIMETHOPRIM 800; 160 MG/1; MG/1
1 TABLET ORAL 2 TIMES DAILY
Qty: 14 TABLET | Refills: 0 | Status: SHIPPED | OUTPATIENT
Start: 2019-04-05 | End: 2019-04-12

## 2019-04-07 LAB
ORGANISM: ABNORMAL
URINE CULTURE, ROUTINE: ABNORMAL
URINE CULTURE, ROUTINE: ABNORMAL

## 2019-04-11 ENCOUNTER — TELEPHONE (OUTPATIENT)
Dept: PRIMARY CARE CLINIC | Age: 79
End: 2019-04-11

## 2019-04-11 RX ORDER — FLUTICASONE PROPIONATE 50 MCG
2 SPRAY, SUSPENSION (ML) NASAL DAILY
Qty: 1 BOTTLE | Refills: 3 | Status: SHIPPED | OUTPATIENT
Start: 2019-04-11 | End: 2020-11-16 | Stop reason: SDUPTHER

## 2019-04-11 RX ORDER — AZITHROMYCIN 250 MG/1
TABLET, FILM COATED ORAL
Qty: 6 TABLET | Refills: 0 | Status: SHIPPED | OUTPATIENT
Start: 2019-04-11 | End: 2019-09-25

## 2019-04-11 RX ORDER — GUAIFENESIN 600 MG/1
600 TABLET, EXTENDED RELEASE ORAL 2 TIMES DAILY
Qty: 30 TABLET | Refills: 0 | Status: SHIPPED | OUTPATIENT
Start: 2019-04-11 | End: 2019-04-26

## 2019-06-03 RX ORDER — VERAPAMIL HYDROCHLORIDE 240 MG/1
240 TABLET, FILM COATED, EXTENDED RELEASE ORAL 2 TIMES DAILY
Qty: 180 TABLET | Refills: 3 | Status: SHIPPED | OUTPATIENT
Start: 2019-06-03 | End: 2020-05-21

## 2019-06-24 RX ORDER — ATORVASTATIN CALCIUM 40 MG/1
TABLET, FILM COATED ORAL
Qty: 30 TABLET | Refills: 2 | Status: SHIPPED | OUTPATIENT
Start: 2019-06-24 | End: 2019-07-28 | Stop reason: SDUPTHER

## 2019-06-26 RX ORDER — MELOXICAM 15 MG/1
TABLET ORAL
Qty: 90 TABLET | Refills: 2 | Status: SHIPPED | OUTPATIENT
Start: 2019-06-26 | End: 2019-07-29

## 2019-06-27 DIAGNOSIS — F51.01 PRIMARY INSOMNIA: ICD-10-CM

## 2019-06-27 DIAGNOSIS — F41.0 PANIC ATTACKS: ICD-10-CM

## 2019-07-01 RX ORDER — ZOLPIDEM TARTRATE 10 MG/1
10 TABLET ORAL NIGHTLY PRN
Qty: 30 TABLET | Refills: 2 | Status: SHIPPED | OUTPATIENT
Start: 2019-07-01 | End: 2019-09-27 | Stop reason: SDUPTHER

## 2019-07-01 RX ORDER — ALPRAZOLAM 0.5 MG/1
TABLET ORAL
Qty: 90 TABLET | Refills: 0 | Status: SHIPPED | OUTPATIENT
Start: 2019-07-01 | End: 2019-11-27 | Stop reason: SDUPTHER

## 2019-07-01 RX ORDER — LOSARTAN POTASSIUM AND HYDROCHLOROTHIAZIDE 25; 100 MG/1; MG/1
TABLET ORAL
Qty: 90 TABLET | Refills: 3 | Status: SHIPPED | OUTPATIENT
Start: 2019-07-01 | End: 2020-09-23 | Stop reason: SDUPTHER

## 2019-07-29 RX ORDER — MELOXICAM 15 MG/1
TABLET ORAL
Qty: 90 TABLET | Refills: 2 | Status: SHIPPED | OUTPATIENT
Start: 2019-07-29 | End: 2020-06-28

## 2019-07-29 RX ORDER — ATORVASTATIN CALCIUM 40 MG/1
TABLET, FILM COATED ORAL
Qty: 90 TABLET | Refills: 0 | Status: SHIPPED | OUTPATIENT
Start: 2019-07-29 | End: 2020-01-21

## 2019-09-25 ENCOUNTER — OFFICE VISIT (OUTPATIENT)
Dept: PRIMARY CARE CLINIC | Age: 79
End: 2019-09-25
Payer: MEDICARE

## 2019-09-25 VITALS
WEIGHT: 195 LBS | HEART RATE: 71 BPM | OXYGEN SATURATION: 96 % | RESPIRATION RATE: 22 BRPM | DIASTOLIC BLOOD PRESSURE: 74 MMHG | SYSTOLIC BLOOD PRESSURE: 128 MMHG | HEIGHT: 63 IN | TEMPERATURE: 96.8 F | BODY MASS INDEX: 34.55 KG/M2

## 2019-09-25 DIAGNOSIS — I10 ESSENTIAL HYPERTENSION: Primary | ICD-10-CM

## 2019-09-25 DIAGNOSIS — F41.9 ANXIETY: ICD-10-CM

## 2019-09-25 DIAGNOSIS — Z00.00 ROUTINE GENERAL MEDICAL EXAMINATION AT A HEALTH CARE FACILITY: ICD-10-CM

## 2019-09-25 LAB
ALBUMIN SERPL-MCNC: 4.1 G/DL (ref 3.5–5.2)
ALP BLD-CCNC: 69 U/L (ref 35–104)
ALT SERPL-CCNC: 13 U/L (ref 5–33)
ANION GAP SERPL CALCULATED.3IONS-SCNC: 17 MMOL/L (ref 7–19)
AST SERPL-CCNC: 20 U/L (ref 5–32)
BASOPHILS ABSOLUTE: 0.1 K/UL (ref 0–0.2)
BASOPHILS RELATIVE PERCENT: 1.3 % (ref 0–1)
BILIRUB SERPL-MCNC: 0.4 MG/DL (ref 0.2–1.2)
BUN BLDV-MCNC: 24 MG/DL (ref 8–23)
CALCIUM SERPL-MCNC: 10.2 MG/DL (ref 8.8–10.2)
CHLORIDE BLD-SCNC: 102 MMOL/L (ref 98–111)
CO2: 21 MMOL/L (ref 22–29)
CREAT SERPL-MCNC: 0.9 MG/DL (ref 0.5–0.9)
EOSINOPHILS ABSOLUTE: 0.5 K/UL (ref 0–0.6)
EOSINOPHILS RELATIVE PERCENT: 7.6 % (ref 0–5)
GFR NON-AFRICAN AMERICAN: >60
GLUCOSE BLD-MCNC: 88 MG/DL (ref 74–109)
HCT VFR BLD CALC: 42 % (ref 37–47)
HEMOGLOBIN: 13.4 G/DL (ref 12–16)
IMMATURE GRANULOCYTES #: 0 K/UL
LYMPHOCYTES ABSOLUTE: 1.8 K/UL (ref 1.1–4.5)
LYMPHOCYTES RELATIVE PERCENT: 26.8 % (ref 20–40)
MCH RBC QN AUTO: 28.9 PG (ref 27–31)
MCHC RBC AUTO-ENTMCNC: 31.9 G/DL (ref 33–37)
MCV RBC AUTO: 90.5 FL (ref 81–99)
MONOCYTES ABSOLUTE: 0.7 K/UL (ref 0–0.9)
MONOCYTES RELATIVE PERCENT: 9.6 % (ref 0–10)
NEUTROPHILS ABSOLUTE: 3.7 K/UL (ref 1.5–7.5)
NEUTROPHILS RELATIVE PERCENT: 54.4 % (ref 50–65)
PDW BLD-RTO: 15.2 % (ref 11.5–14.5)
PLATELET # BLD: 380 K/UL (ref 130–400)
PMV BLD AUTO: 9.8 FL (ref 9.4–12.3)
POTASSIUM SERPL-SCNC: 4.8 MMOL/L (ref 3.5–5)
RBC # BLD: 4.64 M/UL (ref 4.2–5.4)
SODIUM BLD-SCNC: 140 MMOL/L (ref 136–145)
TOTAL PROTEIN: 7.2 G/DL (ref 6.6–8.7)
WBC # BLD: 6.8 K/UL (ref 4.8–10.8)

## 2019-09-25 PROCEDURE — 4040F PNEUMOC VAC/ADMIN/RCVD: CPT | Performed by: FAMILY MEDICINE

## 2019-09-25 PROCEDURE — G0439 PPPS, SUBSEQ VISIT: HCPCS | Performed by: FAMILY MEDICINE

## 2019-09-25 PROCEDURE — G8599 NO ASA/ANTIPLAT THER USE RNG: HCPCS | Performed by: FAMILY MEDICINE

## 2019-09-25 PROCEDURE — 99213 OFFICE O/P EST LOW 20 MIN: CPT | Performed by: FAMILY MEDICINE

## 2019-09-25 PROCEDURE — G8427 DOCREV CUR MEDS BY ELIG CLIN: HCPCS | Performed by: FAMILY MEDICINE

## 2019-09-25 PROCEDURE — G8399 PT W/DXA RESULTS DOCUMENT: HCPCS | Performed by: FAMILY MEDICINE

## 2019-09-25 PROCEDURE — G8417 CALC BMI ABV UP PARAM F/U: HCPCS | Performed by: FAMILY MEDICINE

## 2019-09-25 PROCEDURE — 36415 COLL VENOUS BLD VENIPUNCTURE: CPT | Performed by: FAMILY MEDICINE

## 2019-09-25 PROCEDURE — 1123F ACP DISCUSS/DSCN MKR DOCD: CPT | Performed by: FAMILY MEDICINE

## 2019-09-25 PROCEDURE — 1090F PRES/ABSN URINE INCON ASSESS: CPT | Performed by: FAMILY MEDICINE

## 2019-09-25 PROCEDURE — 1036F TOBACCO NON-USER: CPT | Performed by: FAMILY MEDICINE

## 2019-09-25 ASSESSMENT — PATIENT HEALTH QUESTIONNAIRE - PHQ9
SUM OF ALL RESPONSES TO PHQ QUESTIONS 1-9: 0
SUM OF ALL RESPONSES TO PHQ QUESTIONS 1-9: 0

## 2019-09-25 ASSESSMENT — ENCOUNTER SYMPTOMS
EYE DISCHARGE: 0
COUGH: 0
WHEEZING: 0
DIARRHEA: 0
COLOR CHANGE: 0
VOMITING: 0
ABDOMINAL PAIN: 0
NAUSEA: 0
BACK PAIN: 0

## 2019-09-25 ASSESSMENT — LIFESTYLE VARIABLES: HOW OFTEN DO YOU HAVE A DRINK CONTAINING ALCOHOL: 0

## 2019-09-25 NOTE — PROGRESS NOTES
(MYRBETRIQ) 50 MG TB24 Take 50 mg by mouth daily 30 tablet 5    indapamide (LOZOL) 2.5 MG tablet Take 1 tablet by mouth 2 times daily 180 tablet 3    potassium chloride (KLOR-CON M20) 20 MEQ extended release tablet Take 1 tablet by mouth 2 times daily 180 tablet 3    DULoxetine (CYMBALTA) 60 MG extended release capsule Take 1 capsule by mouth daily 90 capsule 4    amLODIPine (NORVASC) 10 MG tablet Take 1 tablet by mouth daily 90 tablet 3    citalopram (CELEXA) 40 MG tablet TAKE 1 TABLET BY MOUTH DAILY. 90 tablet 2    gabapentin (NEURONTIN) 100 MG capsule Take 200 mg by mouth 2 times daily.  albuterol sulfate HFA (PROAIR HFA) 108 (90 BASE) MCG/ACT inhaler Inhale 2 puffs into the lungs every 6 hours as needed for Wheezing 1 Inhaler 3    polyethylene glycol (GLYCOLAX) powder DISSOLVE 1 CAPFUL (17 GRAMS) IN 8 OUNCES WATER TWICE A DAY 1054 g 1    HYDROcodone-acetaminophen (NORCO) 7.5-325 MG per tablet Take one up to three times a day as needed for arthritis flares 30 tablet 0    hydrOXYzine (ATARAX) 50 MG tablet TAKE 1 TABLET BY MOUTH NIGHTLY AS NEEDED (SLEEP). 90 tablet 3    Pediatric Multivitamins-Fl (MULT-VITAMIN/FLUORIDE PO) Take  by mouth.  calcium carbonate (OSCAL) 500 MG TABS tablet Take 500 mg by mouth daily.  fish oil-omega-3 fatty acids 1000 MG capsule Take 2 g by mouth daily. No current facility-administered medications for this visit. Allergies   Allergen Reactions    Pollen Extract      Nasal congestion       Review of Systems   Constitutional: Negative for activity change, appetite change and fever. HENT: Negative for congestion and nosebleeds. Eyes: Negative for discharge. Respiratory: Negative for cough and wheezing. Cardiovascular: Negative for chest pain and leg swelling. Gastrointestinal: Negative for abdominal pain, diarrhea, nausea and vomiting. Genitourinary: Negative for difficulty urinating, frequency and urgency.    Musculoskeletal:

## 2019-09-25 NOTE — PROGRESS NOTES
Medicare Annual Wellness Visit  Name: Ajay Frey Date: 2019   MRN: [id number] Sex: Female   Age: 78 y.o. Ethnicity: Non-/Non    : 1940 Race: Sravani Casper is here for Medicare AWV    Screenings for behavioral, psychosocial and functional/safety risks, and cognitive dysfunction are all negative except as indicated below. These results, as well as other patient data from the [address] form, are documented in Flowsheets linked to this Encounter. Allergies   Allergen Reactions    Pollen Extract      Nasal congestion     Prior to Visit Medications    Medication Sig Taking? Authorizing Provider   meloxicam (MOBIC) 15 MG tablet TAKE 1 TABLET BY MOUTH EVERY DAY Yes Sen Arreguin MD   atorvastatin (LIPITOR) 40 MG tablet TAKE 1 TABLET BY MOUTH EVERY DAY Yes Sen Arreguin MD   losartan-hydrochlorothiazide (HYZAAR) 100-25 MG per tablet TAKE 1 TABLET BY MOUTH EVERY DAY Yes Sen Arreguin MD   verapamil (CALAN SR) 240 MG extended release tablet Take 1 tablet by mouth 2 times daily Yes Sen Arreguin MD   fluticasone (FLONASE) 50 MCG/ACT nasal spray 2 sprays by Nasal route daily Yes Sen Arreguin MD   Mirabegron ER (MYRBETRIQ) 50 MG TB24 Take 50 mg by mouth daily Yes Sen Arreguin MD   indapamide (LOZOL) 2.5 MG tablet Take 1 tablet by mouth 2 times daily Yes Sen Arreguin MD   potassium chloride (KLOR-CON M20) 20 MEQ extended release tablet Take 1 tablet by mouth 2 times daily Yes Sen Arreguin MD   DULoxetine (CYMBALTA) 60 MG extended release capsule Take 1 capsule by mouth daily Yes Sen Arreguin MD   amLODIPine (NORVASC) 10 MG tablet Take 1 tablet by mouth daily Yes Sen Arreguin MD   citalopram (CELEXA) 40 MG tablet TAKE 1 TABLET BY MOUTH DAILY.  Yes Sen Arreguin MD   gabapentin (NEURONTIN) 100 MG capsule Take 200 mg by mouth 2 TOTAL KNEE ARTHROPLASTY Right 04/2014     Family History   Problem Relation Age of Onset    Heart Disease Mother     COPD Mother     Heart Disease Father     High Blood Pressure Father     High Blood Pressure Sister     High Blood Pressure Brother     High Blood Pressure Sister        CareTeam (Including outside providers/suppliers regularly involved in providing care):   Patient Care Team:  Dinesh Carroll MD as PCP - General (Family Medicine)  Dinesh Carroll MD as PCP - [facility] Empaneled Provider    Wt Readings from Last 3 Encounters:   09/25/19 195 lb (88.5 kg)   03/20/19 193 lb 9.6 oz (87.8 kg)   09/19/18 200 lb (90.7 kg)     Vitals:    09/25/19 1127   BP: 128/74   Site: Right Upper Arm   Position: Sitting   Cuff Size: Large Adult   Pulse: 71   Resp: 22   Temp: 96.8 °F (36 °C)   TempSrc: Temporal   SpO2: 96%   Weight: 195 lb (88.5 kg)   Height: 5' 3\" (1.6 m)     Body mass index is 34.54 kg/m². Based upon direct observation of the patient, evaluation of cognition reveals recent and remote memory intact. Patient's complete Health Risk Assessment and screening values have been reviewed and are found in Flowsheets. The following problems were reviewed today and where indicated follow up appointments were made and/or referrals ordered. Positive Risk Factor Screenings with Interventions:     Fall Risk:  Timed Up and Go Test > 12 seconds? (Complete if either Fall Risk answers are Yes): no  2 or more falls in past year?: (!) yes  Fall with injury in past year?: no  Fall Risk Interventions:    · Home safety tips provided    Health Habits/Nutrition:  Health Habits/Nutrition  Do you exercise for at least 20 minutes 2-3 times per week?: (!) No  Have you lost any weight without trying in the past 3 months?: No  Do you eat fewer than 2 meals per day?: No  Have you seen a dentist within the past year?: Yes  Body mass index is 34.54 kg/m².   Health Habits/Nutrition Interventions:  · Inadequate

## 2019-09-27 DIAGNOSIS — F51.01 PRIMARY INSOMNIA: ICD-10-CM

## 2019-09-30 RX ORDER — ZOLPIDEM TARTRATE 10 MG/1
10 TABLET ORAL NIGHTLY PRN
Qty: 30 TABLET | Refills: 2 | Status: SHIPPED | OUTPATIENT
Start: 2019-09-30 | End: 2019-12-30

## 2019-10-23 RX ORDER — MIRABEGRON 50 MG/1
TABLET, FILM COATED, EXTENDED RELEASE ORAL
Qty: 30 TABLET | Refills: 5 | Status: SHIPPED | OUTPATIENT
Start: 2019-10-23 | End: 2020-04-23

## 2019-10-27 DIAGNOSIS — F41.0 PANIC ATTACKS: ICD-10-CM

## 2019-10-28 RX ORDER — CITALOPRAM 40 MG/1
TABLET ORAL
Qty: 90 TABLET | Refills: 2 | Status: SHIPPED | OUTPATIENT
Start: 2019-10-28 | End: 2020-07-22 | Stop reason: SDUPTHER

## 2019-11-26 RX ORDER — AMLODIPINE BESYLATE 10 MG/1
TABLET ORAL
Qty: 90 TABLET | Refills: 3 | Status: SHIPPED | OUTPATIENT
Start: 2019-11-26 | End: 2020-10-27 | Stop reason: SDUPTHER

## 2019-11-27 DIAGNOSIS — F41.0 PANIC ATTACKS: ICD-10-CM

## 2019-11-27 RX ORDER — ALPRAZOLAM 0.5 MG/1
0.5 TABLET ORAL 3 TIMES DAILY PRN
Qty: 90 TABLET | Refills: 0 | Status: SHIPPED | OUTPATIENT
Start: 2019-11-27 | End: 2020-02-26

## 2019-12-28 DIAGNOSIS — F51.01 PRIMARY INSOMNIA: ICD-10-CM

## 2019-12-30 RX ORDER — ZOLPIDEM TARTRATE 10 MG/1
10 TABLET ORAL NIGHTLY PRN
Qty: 30 TABLET | Refills: 2 | Status: SHIPPED | OUTPATIENT
Start: 2019-12-30 | End: 2020-03-25

## 2020-01-21 RX ORDER — ATORVASTATIN CALCIUM 40 MG/1
TABLET, FILM COATED ORAL
Qty: 90 TABLET | Refills: 0 | Status: SHIPPED | OUTPATIENT
Start: 2020-01-21 | End: 2020-04-28

## 2020-01-24 RX ORDER — POTASSIUM CHLORIDE 1500 MG/1
TABLET, EXTENDED RELEASE ORAL
Qty: 180 TABLET | Refills: 3 | Status: SHIPPED | OUTPATIENT
Start: 2020-01-24 | End: 2021-01-26

## 2020-02-06 ENCOUNTER — TELEPHONE (OUTPATIENT)
Dept: PRIMARY CARE CLINIC | Age: 80
End: 2020-02-06

## 2020-02-06 RX ORDER — FLUCONAZOLE 150 MG/1
150 TABLET ORAL
Qty: 2 TABLET | Refills: 0 | Status: SHIPPED | OUTPATIENT
Start: 2020-02-06 | End: 2020-02-12

## 2020-02-06 NOTE — TELEPHONE ENCOUNTER
PT has some yeast under her belly that is going into the creases of her legs and has tried everything she can think of at home and would like to see if something could be called in for her.

## 2020-02-07 ENCOUNTER — TELEPHONE (OUTPATIENT)
Dept: PRIMARY CARE CLINIC | Age: 80
End: 2020-02-07

## 2020-02-07 RX ORDER — NYSTATIN 100000 [USP'U]/G
POWDER TOPICAL
Qty: 1 BOTTLE | Refills: 1 | Status: SHIPPED | OUTPATIENT
Start: 2020-02-07 | End: 2020-02-07 | Stop reason: SDUPTHER

## 2020-02-10 RX ORDER — NYSTATIN 100000 [USP'U]/G
POWDER TOPICAL
Qty: 1 BOTTLE | Refills: 1 | Status: SHIPPED | OUTPATIENT
Start: 2020-02-10 | End: 2021-10-01 | Stop reason: SDUPTHER

## 2020-02-24 RX ORDER — INDAPAMIDE 2.5 MG/1
TABLET, FILM COATED ORAL
Qty: 180 TABLET | Refills: 3 | Status: SHIPPED | OUTPATIENT
Start: 2020-02-24 | End: 2021-02-19

## 2020-02-26 RX ORDER — ALPRAZOLAM 0.5 MG/1
0.5 TABLET ORAL 3 TIMES DAILY PRN
Qty: 90 TABLET | Refills: 0 | Status: SHIPPED | OUTPATIENT
Start: 2020-02-26 | End: 2020-04-09

## 2020-03-24 RX ORDER — DULOXETIN HYDROCHLORIDE 60 MG/1
CAPSULE, DELAYED RELEASE ORAL
Qty: 90 CAPSULE | Refills: 4 | Status: SHIPPED | OUTPATIENT
Start: 2020-03-24 | End: 2021-06-21

## 2020-03-25 ENCOUNTER — TELEMEDICINE (OUTPATIENT)
Dept: PRIMARY CARE CLINIC | Age: 80
End: 2020-03-25
Payer: MEDICARE

## 2020-03-25 ENCOUNTER — TELEPHONE (OUTPATIENT)
Dept: PRIMARY CARE CLINIC | Age: 80
End: 2020-03-25

## 2020-03-25 PROCEDURE — 4040F PNEUMOC VAC/ADMIN/RCVD: CPT | Performed by: FAMILY MEDICINE

## 2020-03-25 PROCEDURE — 1090F PRES/ABSN URINE INCON ASSESS: CPT | Performed by: FAMILY MEDICINE

## 2020-03-25 PROCEDURE — 1123F ACP DISCUSS/DSCN MKR DOCD: CPT | Performed by: FAMILY MEDICINE

## 2020-03-25 PROCEDURE — 99214 OFFICE O/P EST MOD 30 MIN: CPT | Performed by: FAMILY MEDICINE

## 2020-03-25 PROCEDURE — G8399 PT W/DXA RESULTS DOCUMENT: HCPCS | Performed by: FAMILY MEDICINE

## 2020-03-25 PROCEDURE — G8427 DOCREV CUR MEDS BY ELIG CLIN: HCPCS | Performed by: FAMILY MEDICINE

## 2020-03-25 RX ORDER — ZOLPIDEM TARTRATE 10 MG/1
10 TABLET ORAL NIGHTLY PRN
Qty: 30 TABLET | Refills: 2 | Status: SHIPPED | OUTPATIENT
Start: 2020-03-25 | End: 2020-06-28

## 2020-03-25 ASSESSMENT — ENCOUNTER SYMPTOMS
NAUSEA: 0
VOMITING: 0
DIARRHEA: 0
COLOR CHANGE: 0
BACK PAIN: 0
EYE DISCHARGE: 0
ABDOMINAL PAIN: 0
WHEEZING: 0
COUGH: 0

## 2020-03-25 NOTE — PROGRESS NOTES
[address], [address] [facility] Jero Lewis             Phone:  [phone]  Fax:  [phone]      3/25/2020    TELEHEALTH EVALUATION -- Audio/Visual (During LKNNV-16 public health emergency)    HPI:    Nati Yu (:  1940) has requested an audio/video evaluation for the following concern(s):    Patient is here today for 6-month follow-up. This video was done via a video visit. I was able to see the patient face-to-face through her [employer] nayely. She is on Xanax as needed for anxiety. She states that she takes this as needed. She denies any side effects of the medication. She states that she has not had increase the frequency or change the dosage. She has been on this for a long time. She is tried other medications and nothing seems to work as well as the Xanax. She is also on Cymbalta as needed for anxiety and depression. She states that she is tolerating this well and denies any side effects to it. She states that she is not had any issues with her medications recently. She does have a history of hypertension. She is on amlodipine. She states that she also takes her Hyzaar. She takes these routinely. She does not monitor her blood pressure routinely at home but she states the last several times that she has checked it has been good. She denies any chest pain, palpitations, headaches, or dizziness. She states that she also is on Ambien as needed for insomnia. She states that she has been on this for a long long time. She states that she has tried other medications but she does not sleep as well when she takes those. She states that she is tolerating this medication well and denies any side effects to it. She does not take it in her Xanax together. Review of Systems   Constitutional: Negative for activity change, appetite change and fever. HENT: Negative for congestion and nosebleeds. Eyes: Negative for discharge.    Respiratory: Negative for cough and wheezing. Cardiovascular: Negative for chest pain and leg swelling. Gastrointestinal: Negative for abdominal pain, diarrhea, nausea and vomiting. Genitourinary: Negative for difficulty urinating, frequency and urgency. Musculoskeletal: Negative for back pain and gait problem. Skin: Negative for color change and rash. Neurological: Negative for dizziness and headaches. Hematological: Does not bruise/bleed easily. Psychiatric/Behavioral: Negative for sleep disturbance and suicidal ideas. The patient is nervous/anxious. Prior to Visit Medications    Medication Sig Taking? Authorizing Provider   DULoxetine (CYMBALTA) 60 MG extended release capsule TAKE 1 CAPSULE BY MOUTH EVERY DAY  Eusebia Claudio MD   ALPRAZolam (XANAX) 0.5 MG tablet Take 1 tablet by mouth 3 times daily as needed for Sleep or Anxiety for up to 30 days. Marissa Calderón MD   indapamide (LOZOL) 2.5 MG tablet TAKE 1 TABLET BY MOUTH TWICE A DAY  Eusebia Claudio MD   nystatin (MYCOSTATIN) 753808 UNIT/GM powder Apply 3 times daily.   CRISTIANE Brian - CNP   KLOR-CON M20 20 MEQ extended release tablet TAKE 1 TABLET BY MOUTH TWICE A DAY  Eusebia Claudio MD   atorvastatin (LIPITOR) 40 MG tablet TAKE 1 TABLET BY MOUTH EVERY DAY  Eusebia Claudio MD   amLODIPine (NORVASC) 10 MG tablet TAKE 1 TABLET BY MOUTH EVERY DAY  Eusebia Claudio MD   citalopram (CELEXA) 40 MG tablet TAKE 1 TABLET BY MOUTH EVERY DAY  Eusebia Claudio MD   MYRBETRIQ 50 MG TB24 TAKE 1 TABLET BY MOUTH DAILY  Eusebia Claudio MD   meloxicam (MOBIC) 15 MG tablet TAKE 1 TABLET BY MOUTH EVERY DAY  Eusebia Claudio MD   losartan-hydrochlorothiazide (HYZAAR) 100-25 MG per tablet TAKE 1 TABLET BY MOUTH EVERY DAY  Eusebia Claudio MD   verapamil (CALAN SR) 240 MG extended release tablet Take 1 tablet by mouth 2 times daily  Marissa Calderón MD   fluticasone (FLONASE) 50 MCG/ACT nasal spray 2 sprays by Nasal route daily  Eusebia Claudio MD   gabapentin (NEURONTIN) 100 MG capsule Take 200 mg by mouth 2 times daily. Historical Provider, MD   albuterol sulfate HFA (PROAIR HFA) 108 (90 BASE) MCG/ACT inhaler Inhale 2 puffs into the lungs every 6 hours as needed for Wheezing  Eusebia Claudio MD   polyethylene glycol (GLYCOLAX) powder DISSOLVE 1 CAPFUL (17 GRAMS) IN 8 OUNCES WATER TWICE A DAY  CRISTIANE Haq   HYDROcodone-acetaminophen (NORCO) 7.5-325 MG per tablet Take one up to three times a day as needed for arthritis flares  Megan Stokes MD   hydrOXYzine (ATARAX) 50 MG tablet TAKE 1 TABLET BY MOUTH NIGHTLY AS NEEDED (SLEEP). CRISTIANE Alejandro - CNP   Pediatric Multivitamins-Fl (MULT-VITAMIN/FLUORIDE PO) Take  by mouth. Historical Provider, MD   fish oil-omega-3 fatty acids 1000 MG capsule Take 2 g by mouth daily. Historical Provider, MD   calcium carbonate (OSCAL) 500 MG TABS tablet Take 500 mg by mouth daily. Historical Provider, MD       Social History     Tobacco Use    Smoking status: Former Smoker     Packs/day: 0.50     Years: 20.00     Pack years: 10.00     Last attempt to quit: 1990     Years since quittin.2    Smokeless tobacco: Never Used   Substance Use Topics    Alcohol use: No    Drug use:  No            PHYSICAL EXAMINATION:  [ INSTRUCTIONS:  \"[x]\" Indicates a positive item  \"[]\" Indicates a negative item  -- DELETE ALL ITEMS NOT EXAMINED]  [x] Alert  [x] Oriented to person/place/time    [x] No apparent distress  [] Toxic appearing    [] Face flushed appearing [x] Sclera clear  [] Lips are cyanotic      [x] Breathing appears normal  [] Appears tachypneic      [x] Rash on visible skin    [x] Cranial Nerves II-XII grossly intact    [x] Motor grossly intact in visible upper extremities    [] Motor grossly intact in visible lower extremities    [x] Normal Mood  [] Anxious appearing    [] Depressed appearing  [] Confused appearing      [] Poor short term memory  [] Poor long term memory    [] OTHER:      Due to this being a TeleHealth encounter, evaluation of the following organ systems is limited: Vitals/Constitutional/EENT/Resp/CV/GI//MS/Neuro/Skin/Heme-Lymph-Imm. ASSESSMENT/PLAN:  1. Essential hypertension  Continue current blood pressure medications. I encouraged her to continue to monitor her blood pressures at home. She is to follow-up with us if she has problems or complications. 2. Panic attacks  I have refilled her Xanax today. Tejas Kerns has been requested. Follow-up with us in 6 months for routine checkup on this. 3. Primary insomnia  Refilled Ambien today. Tejas Kerns requested. 4. Anxiety  See above. No follow-ups on file. An  electronic signature was used to authenticate this note. --Thomas Mir MD on 3/25/2020 at 2:15 PM      Pursuant to the emergency declaration under the [facility] [place], [facility] waiver authority and the [employer] Act, this Virtual  Visit was conducted, with patient's consent, to reduce the patient's risk of exposure to COVID-19 and provide continuity of care for an established patient. Services were provided through a video synchronous discussion virtually to substitute for in-person clinic visit.

## 2020-03-25 NOTE — TELEPHONE ENCOUNTER
Karyna Neil called to inform office that they accept option to do a Virtual Visit. Patient has access to [employer]. Please call patient with any changes/questions/[phone]. Her screen says,\"my chart can't obtain connection to \". Thanks.

## 2020-04-09 RX ORDER — ALPRAZOLAM 0.5 MG/1
TABLET ORAL
Qty: 90 TABLET | Refills: 0 | Status: SHIPPED | OUTPATIENT
Start: 2020-04-09 | End: 2020-06-28

## 2020-04-23 RX ORDER — MIRABEGRON 50 MG/1
TABLET, FILM COATED, EXTENDED RELEASE ORAL
Qty: 30 TABLET | Refills: 5 | Status: SHIPPED | OUTPATIENT
Start: 2020-04-23 | End: 2020-11-18

## 2020-05-21 RX ORDER — VERAPAMIL HYDROCHLORIDE 240 MG/1
TABLET, FILM COATED, EXTENDED RELEASE ORAL
Qty: 180 TABLET | Refills: 3 | Status: SHIPPED | OUTPATIENT
Start: 2020-05-21 | End: 2021-04-27

## 2020-06-28 RX ORDER — ZOLPIDEM TARTRATE 10 MG/1
10 TABLET ORAL NIGHTLY PRN
Qty: 30 TABLET | Refills: 2 | Status: SHIPPED | OUTPATIENT
Start: 2020-06-28 | End: 2020-09-24

## 2020-06-28 RX ORDER — MELOXICAM 15 MG/1
TABLET ORAL
Qty: 90 TABLET | Refills: 2 | Status: SHIPPED | OUTPATIENT
Start: 2020-06-28 | End: 2021-02-17

## 2020-06-28 RX ORDER — ALPRAZOLAM 0.5 MG/1
TABLET ORAL
Qty: 90 TABLET | Refills: 0 | Status: SHIPPED | OUTPATIENT
Start: 2020-06-28 | End: 2020-09-24

## 2020-07-22 RX ORDER — CITALOPRAM 40 MG/1
40 TABLET ORAL DAILY
Qty: 90 TABLET | Refills: 3 | Status: SHIPPED | OUTPATIENT
Start: 2020-07-22 | End: 2021-06-28

## 2020-07-27 RX ORDER — ATORVASTATIN CALCIUM 40 MG/1
TABLET, FILM COATED ORAL
Qty: 90 TABLET | Refills: 0 | Status: SHIPPED | OUTPATIENT
Start: 2020-07-27 | End: 2020-08-05 | Stop reason: SDUPTHER

## 2020-08-05 RX ORDER — ATORVASTATIN CALCIUM 40 MG/1
40 TABLET, FILM COATED ORAL DAILY
Qty: 90 TABLET | Refills: 3 | Status: SHIPPED | OUTPATIENT
Start: 2020-08-05 | End: 2021-10-01 | Stop reason: SDUPTHER

## 2020-09-23 RX ORDER — LOSARTAN POTASSIUM AND HYDROCHLOROTHIAZIDE 25; 100 MG/1; MG/1
1 TABLET ORAL DAILY
Qty: 90 TABLET | Refills: 3 | Status: SHIPPED | OUTPATIENT
Start: 2020-09-23 | End: 2021-09-20

## 2020-09-24 RX ORDER — ZOLPIDEM TARTRATE 10 MG/1
10 TABLET ORAL NIGHTLY PRN
Qty: 30 TABLET | Refills: 2 | Status: SHIPPED | OUTPATIENT
Start: 2020-09-24 | End: 2020-12-31

## 2020-09-24 RX ORDER — ALPRAZOLAM 0.5 MG/1
TABLET ORAL
Qty: 90 TABLET | Refills: 0 | Status: SHIPPED | OUTPATIENT
Start: 2020-09-24 | End: 2021-02-22

## 2020-10-27 RX ORDER — AMLODIPINE BESYLATE 10 MG/1
10 TABLET ORAL DAILY
Qty: 90 TABLET | Refills: 3 | Status: SHIPPED | OUTPATIENT
Start: 2020-10-27 | End: 2021-09-20

## 2020-11-10 ENCOUNTER — OFFICE VISIT (OUTPATIENT)
Dept: PRIMARY CARE CLINIC | Age: 80
End: 2020-11-10
Payer: MEDICARE

## 2020-11-10 VITALS
BODY MASS INDEX: 33.49 KG/M2 | OXYGEN SATURATION: 97 % | TEMPERATURE: 98.2 F | HEART RATE: 73 BPM | SYSTOLIC BLOOD PRESSURE: 122 MMHG | HEIGHT: 63 IN | WEIGHT: 189 LBS | RESPIRATION RATE: 16 BRPM | DIASTOLIC BLOOD PRESSURE: 64 MMHG

## 2020-11-10 LAB
ALBUMIN SERPL-MCNC: 4 G/DL (ref 3.5–5.2)
ALP BLD-CCNC: 79 U/L (ref 35–104)
ALT SERPL-CCNC: 15 U/L (ref 5–33)
ANION GAP SERPL CALCULATED.3IONS-SCNC: 14 MMOL/L (ref 7–19)
AST SERPL-CCNC: 19 U/L (ref 5–32)
BASOPHILS ABSOLUTE: 0.1 K/UL (ref 0–0.2)
BASOPHILS RELATIVE PERCENT: 1 % (ref 0–1)
BILIRUB SERPL-MCNC: 0.3 MG/DL (ref 0.2–1.2)
BUN BLDV-MCNC: 23 MG/DL (ref 8–23)
CALCIUM SERPL-MCNC: 10 MG/DL (ref 8.8–10.2)
CHLORIDE BLD-SCNC: 101 MMOL/L (ref 98–111)
CO2: 26 MMOL/L (ref 22–29)
CREAT SERPL-MCNC: 0.8 MG/DL (ref 0.5–0.9)
EOSINOPHILS ABSOLUTE: 0.3 K/UL (ref 0–0.6)
EOSINOPHILS RELATIVE PERCENT: 4.4 % (ref 0–5)
GFR AFRICAN AMERICAN: >59
GFR NON-AFRICAN AMERICAN: >60
GLUCOSE BLD-MCNC: 61 MG/DL (ref 74–109)
HCT VFR BLD CALC: 45.2 % (ref 37–47)
HEMOGLOBIN: 13.8 G/DL (ref 12–16)
IMMATURE GRANULOCYTES #: 0 K/UL
LYMPHOCYTES ABSOLUTE: 1.9 K/UL (ref 1.1–4.5)
LYMPHOCYTES RELATIVE PERCENT: 25.5 % (ref 20–40)
MCH RBC QN AUTO: 28.4 PG (ref 27–31)
MCHC RBC AUTO-ENTMCNC: 30.5 G/DL (ref 33–37)
MCV RBC AUTO: 93 FL (ref 81–99)
MONOCYTES ABSOLUTE: 0.5 K/UL (ref 0–0.9)
MONOCYTES RELATIVE PERCENT: 7.2 % (ref 0–10)
NEUTROPHILS ABSOLUTE: 4.5 K/UL (ref 1.5–7.5)
NEUTROPHILS RELATIVE PERCENT: 61.6 % (ref 50–65)
PDW BLD-RTO: 14.2 % (ref 11.5–14.5)
PLATELET # BLD: 349 K/UL (ref 130–400)
PMV BLD AUTO: 9.9 FL (ref 9.4–12.3)
POTASSIUM SERPL-SCNC: 4.7 MMOL/L (ref 3.5–5)
RBC # BLD: 4.86 M/UL (ref 4.2–5.4)
SODIUM BLD-SCNC: 141 MMOL/L (ref 136–145)
TOTAL PROTEIN: 7 G/DL (ref 6.6–8.7)
WBC # BLD: 7.3 K/UL (ref 4.8–10.8)

## 2020-11-10 PROCEDURE — G8417 CALC BMI ABV UP PARAM F/U: HCPCS | Performed by: FAMILY MEDICINE

## 2020-11-10 PROCEDURE — G8399 PT W/DXA RESULTS DOCUMENT: HCPCS | Performed by: FAMILY MEDICINE

## 2020-11-10 PROCEDURE — G8484 FLU IMMUNIZE NO ADMIN: HCPCS | Performed by: FAMILY MEDICINE

## 2020-11-10 PROCEDURE — 1090F PRES/ABSN URINE INCON ASSESS: CPT | Performed by: FAMILY MEDICINE

## 2020-11-10 PROCEDURE — 1036F TOBACCO NON-USER: CPT | Performed by: FAMILY MEDICINE

## 2020-11-10 PROCEDURE — 99214 OFFICE O/P EST MOD 30 MIN: CPT | Performed by: FAMILY MEDICINE

## 2020-11-10 PROCEDURE — 36415 COLL VENOUS BLD VENIPUNCTURE: CPT | Performed by: FAMILY MEDICINE

## 2020-11-10 PROCEDURE — 1123F ACP DISCUSS/DSCN MKR DOCD: CPT | Performed by: FAMILY MEDICINE

## 2020-11-10 PROCEDURE — G0439 PPPS, SUBSEQ VISIT: HCPCS | Performed by: FAMILY MEDICINE

## 2020-11-10 PROCEDURE — 4040F PNEUMOC VAC/ADMIN/RCVD: CPT | Performed by: FAMILY MEDICINE

## 2020-11-10 PROCEDURE — G8427 DOCREV CUR MEDS BY ELIG CLIN: HCPCS | Performed by: FAMILY MEDICINE

## 2020-11-10 ASSESSMENT — PATIENT HEALTH QUESTIONNAIRE - PHQ9
SUM OF ALL RESPONSES TO PHQ QUESTIONS 1-9: 1
SUM OF ALL RESPONSES TO PHQ9 QUESTIONS 1 & 2: 1
2. FEELING DOWN, DEPRESSED OR HOPELESS: 0
SUM OF ALL RESPONSES TO PHQ QUESTIONS 1-9: 1
SUM OF ALL RESPONSES TO PHQ QUESTIONS 1-9: 1
1. LITTLE INTEREST OR PLEASURE IN DOING THINGS: 1

## 2020-11-10 ASSESSMENT — ENCOUNTER SYMPTOMS
BACK PAIN: 0
VOMITING: 0
NAUSEA: 0
ABDOMINAL PAIN: 0
EYE DISCHARGE: 0
COUGH: 1
COLOR CHANGE: 0
DIARRHEA: 0
WHEEZING: 0

## 2020-11-10 ASSESSMENT — LIFESTYLE VARIABLES: HOW OFTEN DO YOU HAVE A DRINK CONTAINING ALCOHOL: 0

## 2020-11-10 NOTE — PROGRESS NOTES
Medicare Annual Wellness Visit  Name: Ernestine Myers Date: 11/10/2020   MRN: [id number] Sex: Female   Age: [de-identified] y.o. Ethnicity: Non-/Non    : 1940 Race: Nomi Espinal is here for Medicare AWV    Screenings for behavioral, psychosocial and functional/safety risks, and cognitive dysfunction are all negative except as indicated below. These results, as well as other patient data from the [address] form, are documented in Flowsheets linked to this Encounter. Allergies   Allergen Reactions    Pollen Extract      Nasal congestion         Prior to Visit Medications    Medication Sig Taking? Authorizing Provider   amLODIPine (NORVASC) 10 MG tablet Take 1 tablet by mouth daily Yes Tan Callahan MD   losartan-hydroCHLOROthiazide (HYZAAR) 100-25 MG per tablet Take 1 tablet by mouth daily Yes Tan Callahan MD   atorvastatin (LIPITOR) 40 MG tablet Take 1 tablet by mouth daily Yes Tan Callahan MD   citalopram (CELEXA) 40 MG tablet Take 1 tablet by mouth daily Yes Eusebia Claudio MD   meloxicam (MOBIC) 15 MG tablet TAKE 1 TABLET BY MOUTH EVERY DAY Yes Eusebia Claudio MD   verapamil (CALAN SR) 240 MG extended release tablet TAKE 1 TABLET BY MOUTH TWICE A DAY Yes Eusebia Claudio MD   MYRBETRIQ 50 MG TB24 TAKE 1 TABLET BY MOUTH EVERY DAY Yes Tan Callahan MD   DULoxetine (CYMBALTA) 60 MG extended release capsule TAKE 1 CAPSULE BY MOUTH EVERY DAY Yes Tan Callahan MD   indapamide (LOZOL) 2.5 MG tablet TAKE 1 TABLET BY MOUTH TWICE A DAY Yes Eusebia Claudio MD   nystatin (MYCOSTATIN) 133484 UNIT/GM powder Apply 3 times daily.  Yes CRISTIANE Mercado - CNP   KLOR-CON M20 20 MEQ extended release tablet TAKE 1 TABLET BY MOUTH TWICE A DAY Yes Eusebia Claudio MD   fluticasone (FLONASE) 50 MCG/ACT nasal spray 2 sprays by Nasal route daily Yes Tan Callahan MD gabapentin (NEURONTIN) 100 MG capsule Take 200 mg by mouth 2 times daily. Yes Historical Provider, MD   albuterol sulfate HFA (PROAIR HFA) 108 (90 BASE) MCG/ACT inhaler Inhale 2 puffs into the lungs every 6 hours as needed for Wheezing Yes Eusebia Claudio MD   polyethylene glycol (GLYCOLAX) powder DISSOLVE 1 CAPFUL (17 GRAMS) IN 8 OUNCES WATER TWICE A DAY Yes CRISTIANE Haq   HYDROcodone-acetaminophen ([facility]) 7.5-325 MG per tablet Take one up to three times a day as needed for arthritis flares Yes Prakash Ring MD   hydrOXYzine (ATARAX) 50 MG tablet TAKE 1 TABLET BY MOUTH NIGHTLY AS NEEDED (SLEEP). Yes CRISTIANE Rasmussen - CNP   Pediatric Multivitamins-Fl (MULT-VITAMIN/FLUORIDE PO) Take  by mouth. Yes Historical Provider, MD   fish oil-omega-3 fatty acids 1000 MG capsule Take 2 g by mouth daily. Yes Historical Provider, MD   calcium carbonate (OSCAL) 500 MG TABS tablet Take 500 mg by mouth daily.    Yes Historical Provider, MD         Past Medical History:   Diagnosis Date    Arthritis     CAD (coronary artery disease)     Colon polyps     Constipation     Diverticulosis     Gall stones     Gallstones     Hayfever     Heart palpitations     Hypertension     Insomnia     Nasal drainage     Osteoarthritis     Osteopenia 2013    T score -1.7    Panic attacks     Polyp of colon     Rectal carcinoid tumor Dec 2014    Dr. Yuan Blandon found on scope    Seasonal allergies     Shortness of breath on exertion     Sneezing     Urinary frequency    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    Whooping cough     9 mos old       Past Surgical History:   Procedure Laterality Date    APPENDECTOMY      BREAST SURGERY      Biopsy x2    CATARACT REMOVAL Bilateral 4/2015,5/2015    CHOLECYSTECTOMY      COLONOSCOPY  10-    tubular adenoma removed, mild inflammation, needs repeat scope in 2014, Dr. Michael Olvera Right 04/2014         Family History   Problem Relation Age of Onset    Heart Disease Mother     COPD Mother     Heart Disease Father     High Blood Pressure Father     High Blood Pressure Sister     High Blood Pressure Brother     High Blood Pressure Sister        CareTeam (Including outside providers/suppliers regularly involved in providing care):   Patient Care Team:  Roosevelt Nunez MD as PCP - General (Family Medicine)  Roosevelt Nunez MD as PCP - [facility] Empaneled Provider    Wt Readings from Last 3 Encounters:   11/10/20 189 lb (85.7 kg)   09/25/19 195 lb (88.5 kg)   03/20/19 193 lb 9.6 oz (87.8 kg)     Vitals:    11/10/20 1320   BP: 122/64   Site: Left Upper Arm   Position: Sitting   Cuff Size: Medium Adult   Pulse: 73   Resp: 16   Temp: 98.2 °F (36.8 °C)   TempSrc: Temporal   SpO2: 97%   Weight: 189 lb (85.7 kg)   Height: 5' 3\" (1.6 m)     Body mass index is 33.48 kg/m². Based upon direct observation of the patient, evaluation of cognition reveals recent and remote memory intact. Patient's complete Health Risk Assessment and screening values have been reviewed and are found in Flowsheets. The following problems were reviewed today and where indicated follow up appointments were made and/or referrals ordered. Positive Risk Factor Screenings with Interventions:     Fall Risk:  2 or more falls in past year?: (!) yes  Fall with injury in past year?: no  Fall Risk Interventions:    · Home safety tips provided    General Health and ACP:  General  In general, how would you say your health is?: Good  In the past 7 days, have you experienced any of the following?  New or Increased Pain, New or Increased Fatigue, Loneliness, Social Isolation, Stress or Anger?: (!) Social Isolation  Do you get the social and emotional support that you need?: Yes  Do you have a Living Will?: Yes  Advance Directives     Power of  Living Will ACP-Advance Directive ACP-Power of     Not on File Not on [address] Risk Interventions:  · Social isolation: patient declines any further intervention for this issue    Health Habits/Nutrition:  Health Habits/Nutrition  Do you exercise for at least 20 minutes 2-3 times per week?: (!) No  Have you lost any weight without trying in the past 3 months?: No  Do you eat fewer than 2 meals per day?: No  Have you seen a dentist within the past year?: Yes  Body mass index: (!) 33.48  Health Habits/Nutrition Interventions:  · Inadequate physical activity:  educational materials provided to promote increased physical activity  · Nutritional issues:  educational materials for healthy, well-balanced diet provided    Safety:  Safety  Do you have working smoke detectors?: Yes  Have all throw rugs been removed or fastened?: (!) No  Do you have non-slip mats or surfaces in all bathtubs/showers?: Yes  Do all of your stairways have a railing or banister?: Yes  Are your doorways, halls and stairs free of clutter?: Yes  Do you always fasten your seatbelt when you are in a car?: (!) No  Safety Interventions:  · Home safety tips provided    ADL:  ADLs  In the past 7 days, did you need help from others to perform any of the following everyday activities? Eating, dressing, grooming, bathing, toileting, or walking/balance?: (!) Walking/Balance(Uses a cane)  In the past 7 days, did you need help from others to take care of any of the following?  Laundry, housekeeping, banking/finances, shopping, telephone use, food preparation, transportation, or taking medications?: None  ADL Interventions:  · Patient declines any further evaluation/treatment for this issue    Personalized Preventive Plan   Current Health Maintenance Status  Immunization History   Administered Date(s) Administered    Influenza 09/12/2012    Influenza Virus Vaccine 10/13/2014, 12/01/2015, 09/25/2017, 09/26/2020    Influenza, Quadv, IM, (6 mo and older Fluzone, Flulaval, Fluarix and 3 yrs and older Afluria) 09/19/2018    Pneumococcal Conjugate 13-valent ([id number]) 03/19/2018    Pneumococcal Polysaccharide ([id number]) 10/22/2008    Tdap (Boostrix, Adacel) 06/10/2014    Zoster Live (Zostavax) 11/14/2012        Health Maintenance   Topic Date Due    Shingles Vaccine (2 of 3) 01/09/2013    Lipid screen  03/28/2019    Annual Wellness Visit (AWV)  05/29/2019    Potassium monitoring  09/25/2020    Creatinine monitoring  09/25/2020    DTaP/Tdap/Td vaccine (2 - Td) 06/10/2024    DEXA (modify frequency per FRAX score)  Completed    Flu vaccine  Completed    Pneumococcal 65+ years Vaccine  Completed    Hepatitis A vaccine  Aged Out    Hepatitis B vaccine  Aged Out    Hib vaccine  Aged Out    Meningococcal (ACWY) vaccine  Aged Out     Recommendations for Enable Injections Due: see orders and patient instructions/AVS.  . Recommended screening schedule for the next 5-10 years is provided to the patient in written form: see Patient Instructions/AVS.    Gold Pagan was seen today for medicare awv.     Diagnoses and all orders for this visit:    Essential hypertension  -     CBC Auto Differential  -     Comprehensive Metabolic Panel    Routine general medical examination at a health care facility

## 2020-11-10 NOTE — PATIENT INSTRUCTIONS
Personalized Preventive Plan for Polo Medina - 11/10/2020  Medicare offers a range of preventive health benefits. Some of the tests and screenings are paid in full while other may be subject to a deductible, co-insurance, and/or copay. Some of these benefits include a comprehensive review of your medical history including lifestyle, illnesses that may run in your family, and various assessments and screenings as appropriate. After reviewing your medical record and screening and assessments performed today your provider may have ordered immunizations, labs, imaging, and/or referrals for you. A list of these orders (if applicable) as well as your Preventive Care list are included within your After Visit Summary for your review. Other Preventive Recommendations:    · A preventive eye exam performed by an eye specialist is recommended every 1-2 years to screen for glaucoma; cataracts, macular degeneration, and other eye disorders. · A preventive dental visit is recommended every 6 months. · Try to get at least 150 minutes of exercise per week or 10,000 steps per day on a pedometer . · Order or download the FREE \"Exercise & Physical Activity: Your Everyday Guide\" from [employer]. Call 8-436.653.1829 or search [employer] online. · You need 5314-8064 mg of calcium and 9086-2075 IU of vitamin D per day. It is possible to meet your calcium requirement with diet alone, but a vitamin D supplement is usually necessary to meet this goal.  · When exposed to the sun, use a sunscreen that protects against both UVA and UVB radiation with an SPF of 30 or greater. Reapply every 2 to 3 hours or after sweating, drying off with a towel, or swimming. · Always wear a seat belt when traveling in a car. Always wear a helmet when riding a bicycle or motorcycle.

## 2020-11-10 NOTE — LETTER
[address] [address] [place] [address]  Phone: [phone]  Fax: [phone] [place], [place]        November 11, 2020     Donnie Hutson  [facility] [address]      Dear Kiera Kind:    Below are the results from your recent visit:    Resulted Orders   CBC Auto Differential   Result Value Ref Range    WBC 7.3 4.8 - 10.8 K/uL    RBC 4.86 4.20 - 5.40 M/uL    Hemoglobin 13.8 12.0 - 16.0 g/dL    Hematocrit 45.2 37.0 - 47.0 %    MCV 93.0 81.0 - 99.0 fL    MCH 28.4 27.0 - 31.0 pg    MCHC 30.5 (L) 33.0 - 37.0 g/dL    RDW 14.2 11.5 - 14.5 %    Platelets 464 263 - 363 K/uL    MPV 9.9 9.4 - 12.3 fL    Neutrophils % 61.6 50.0 - 65.0 %    Lymphocytes % 25.5 20.0 - 40.0 %    Monocytes % 7.2 0.0 - 10.0 %    Eosinophils % 4.4 0.0 - 5.0 %    Basophils % 1.0 0.0 - 1.0 %    Neutrophils Absolute 4.5 1.5 - 7.5 K/uL    Immature Granulocytes # 0.0 K/uL    Lymphocytes Absolute 1.9 1.1 - 4.5 K/uL    Monocytes Absolute 0.50 0.00 - 0.90 K/uL    Eosinophils Absolute 0.30 0.00 - 0.60 K/uL    Basophils Absolute 0.10 0.00 - 0.20 K/uL   Comprehensive Metabolic Panel   Result Value Ref Range    Sodium 141 136 - 145 mmol/L    Potassium 4.7 3.5 - 5.0 mmol/L    Chloride 101 98 - 111 mmol/L    CO2 26 22 - 29 mmol/L    Anion Gap 14 7 - 19 mmol/L    Glucose 61 (L) 74 - 109 mg/dL    BUN 23 8 - 23 mg/dL    CREATININE 0.8 0.5 - 0.9 mg/dL    GFR Non-African American >60 >60      Comment: This calculation may be inaccurate for patients under the age of 25 years. For ages 25 and older, a GFR >60 mL/min/1.73m2 (not corrected for weight) is  valid for stable renal function. GFR  >59 >59      Comment:      Chronic Kidney Disease: less than 60 ml/min/1.73 sq.m. Kidney Failure: less than 15 ml/min/1.73 sq.m. Results valid for patients 18 years and older.       Calcium 10.0 8.8 - 10.2 mg/dL    Total Protein 7.0 6.6 - 8.7 g/dL    Alb 4.0 3.5 - 5.2 g/dL

## 2020-11-10 NOTE — PROGRESS NOTES
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    Whooping cough     9 mos old      Current Outpatient Medications   Medication Sig Dispense Refill    amLODIPine (NORVASC) 10 MG tablet Take 1 tablet by mouth daily 90 tablet 3    losartan-hydroCHLOROthiazide (HYZAAR) 100-25 MG per tablet Take 1 tablet by mouth daily 90 tablet 3    atorvastatin (LIPITOR) 40 MG tablet Take 1 tablet by mouth daily 90 tablet 3    citalopram (CELEXA) 40 MG tablet Take 1 tablet by mouth daily 90 tablet 3    meloxicam (MOBIC) 15 MG tablet TAKE 1 TABLET BY MOUTH EVERY DAY 90 tablet 2    verapamil (CALAN SR) 240 MG extended release tablet TAKE 1 TABLET BY MOUTH TWICE A  tablet 3    MYRBETRIQ 50 MG TB24 TAKE 1 TABLET BY MOUTH EVERY DAY 30 tablet 5    DULoxetine (CYMBALTA) 60 MG extended release capsule TAKE 1 CAPSULE BY MOUTH EVERY DAY 90 capsule 4    indapamide (LOZOL) 2.5 MG tablet TAKE 1 TABLET BY MOUTH TWICE A  tablet 3    nystatin (MYCOSTATIN) 359916 UNIT/GM powder Apply 3 times daily. 1 Bottle 1    KLOR-CON M20 20 MEQ extended release tablet TAKE 1 TABLET BY MOUTH TWICE A  tablet 3    fluticasone (FLONASE) 50 MCG/ACT nasal spray 2 sprays by Nasal route daily 1 Bottle 3    gabapentin (NEURONTIN) 100 MG capsule Take 200 mg by mouth 2 times daily.  albuterol sulfate HFA (PROAIR HFA) 108 (90 BASE) MCG/ACT inhaler Inhale 2 puffs into the lungs every 6 hours as needed for Wheezing 1 Inhaler 3    polyethylene glycol (GLYCOLAX) powder DISSOLVE 1 CAPFUL (17 GRAMS) IN 8 OUNCES WATER TWICE A DAY 1054 g 1    HYDROcodone-acetaminophen (NORCO) 7.5-325 MG per tablet Take one up to three times a day as needed for arthritis flares 30 tablet 0    hydrOXYzine (ATARAX) 50 MG tablet TAKE 1 TABLET BY MOUTH NIGHTLY AS NEEDED (SLEEP). 90 tablet 3    Pediatric Multivitamins-Fl (MULT-VITAMIN/FLUORIDE PO) Take  by mouth.         fish oil-omega-3 fatty acids 1000 MG capsule Take 2 g by mouth daily.  calcium carbonate (OSCAL) 500 MG TABS tablet Take 500 mg by mouth daily. No current facility-administered medications for this visit. Allergies   Allergen Reactions    Pollen Extract      Nasal congestion       Review of Systems   Constitutional: Negative for activity change, appetite change and fever. HENT: Positive for congestion and postnasal drip. Negative for nosebleeds. Eyes: Negative for discharge. Respiratory: Positive for cough. Negative for wheezing. Cardiovascular: Negative for chest pain and leg swelling. Gastrointestinal: Negative for abdominal pain, diarrhea, nausea and vomiting. Genitourinary: Negative for difficulty urinating, frequency and urgency. Musculoskeletal: Positive for arthralgias and myalgias. Negative for back pain and gait problem. Skin: Negative for color change and rash. Neurological: Negative for dizziness and headaches. Hematological: Does not bruise/bleed easily. Psychiatric/Behavioral: Negative for sleep disturbance and suicidal ideas. OBJECTIVE:     Physical Exam  Vitals signs reviewed. Constitutional:       General: She is not in acute distress. Appearance: Normal appearance. She is well-developed. She is not diaphoretic. HENT:      Head: Normocephalic and atraumatic. Right Ear: External ear normal.      Left Ear: External ear normal.   Neck:      Musculoskeletal: Normal range of motion and neck supple. Cardiovascular:      Rate and Rhythm: Normal rate and regular rhythm. Pulses: Normal pulses. Heart sounds: Normal heart sounds. No murmur. Pulmonary:      Effort: Pulmonary effort is normal. No respiratory distress. Breath sounds: Normal breath sounds. Abdominal:      General: Abdomen is flat. Bowel sounds are normal.      Palpations: Abdomen is soft. Skin:     General: Skin is warm and dry.    Neurological:      General: No focal deficit present. Mental Status: She is alert and oriented to person, place, and time. Mental status is at baseline. Psychiatric:         Mood and Affect: Mood normal.         Behavior: Behavior normal.         Thought Content: Thought content normal.         Judgment: Judgment normal.        /64 (Site: Left Upper Arm, Position: Sitting, Cuff Size: Medium Adult)   Pulse 73   Temp 98.2 °F (36.8 °C) (Temporal)   Resp 16   Ht 5' 3\" (1.6 m)   Wt 189 lb (85.7 kg)   SpO2 97%   BMI 33.48 kg/m²      ASSESSMENT:    Dora Rothman was seen today for medicare awv. Diagnoses and all orders for this visit:    Essential hypertension  -     CBC Auto Differential  -     Comprehensive Metabolic Panel    Routine general medical examination at a health care facility    Primary insomnia    Anxiety    Coronary artery disease with angina pectoris, unspecified vessel or lesion type, unspecified whether native or transplanted heart [facility])        PLAN:    See lab orders. Will notify results. Continue current medications. Follow-up with us in 6 months for checkup unless needed sooner. EMR Dragon/transcription disclaimer:  Much of this encounter note is electronic transcription/translation of spoken language toprinted texts. The electronic translation of spoken language may be erroneous, or at times, nonsensical words or phrases may be inadvertently transcribed.   Although I have reviewed the note for such errors, some may stillexist.

## 2020-11-16 RX ORDER — FLUTICASONE PROPIONATE 50 MCG
2 SPRAY, SUSPENSION (ML) NASAL DAILY
Qty: 1 BOTTLE | Refills: 3 | Status: SHIPPED | OUTPATIENT
Start: 2020-11-16 | End: 2021-02-16

## 2020-11-16 RX ORDER — ALBUTEROL SULFATE 90 UG/1
2 AEROSOL, METERED RESPIRATORY (INHALATION) EVERY 6 HOURS PRN
Qty: 1 INHALER | Refills: 3 | Status: SHIPPED | OUTPATIENT
Start: 2020-11-16 | End: 2021-02-21 | Stop reason: SDUPTHER

## 2020-11-18 RX ORDER — MIRABEGRON 50 MG/1
TABLET, FILM COATED, EXTENDED RELEASE ORAL
Qty: 30 TABLET | Refills: 5 | Status: SHIPPED | OUTPATIENT
Start: 2020-11-18 | End: 2021-03-25 | Stop reason: SDUPTHER

## 2021-01-26 DIAGNOSIS — F51.01 PRIMARY INSOMNIA: ICD-10-CM

## 2021-01-26 RX ORDER — POTASSIUM CHLORIDE 1500 MG/1
TABLET, EXTENDED RELEASE ORAL
Qty: 180 TABLET | Refills: 3 | Status: SHIPPED | OUTPATIENT
Start: 2021-01-26 | End: 2021-10-01 | Stop reason: SDUPTHER

## 2021-01-27 RX ORDER — ZOLPIDEM TARTRATE 10 MG/1
10 TABLET ORAL NIGHTLY PRN
Qty: 30 TABLET | Refills: 0 | Status: SHIPPED | OUTPATIENT
Start: 2021-01-27 | End: 2021-02-22

## 2021-02-16 RX ORDER — FLUTICASONE PROPIONATE 50 MCG
SPRAY, SUSPENSION (ML) NASAL
Qty: 1 BOTTLE | Refills: 1 | Status: SHIPPED | OUTPATIENT
Start: 2021-02-16 | End: 2021-03-16

## 2021-02-17 RX ORDER — MELOXICAM 15 MG/1
TABLET ORAL
Qty: 90 TABLET | Refills: 2 | Status: SHIPPED | OUTPATIENT
Start: 2021-02-17 | End: 2021-03-25 | Stop reason: SDUPTHER

## 2021-02-21 DIAGNOSIS — F41.0 PANIC ATTACKS: ICD-10-CM

## 2021-02-21 DIAGNOSIS — F51.01 PRIMARY INSOMNIA: ICD-10-CM

## 2021-02-22 RX ORDER — ZOLPIDEM TARTRATE 10 MG/1
TABLET ORAL
Qty: 30 TABLET | Refills: 0 | Status: SHIPPED | OUTPATIENT
Start: 2021-02-22 | End: 2021-03-29

## 2021-02-22 RX ORDER — ALBUTEROL SULFATE 90 UG/1
2 AEROSOL, METERED RESPIRATORY (INHALATION) EVERY 6 HOURS PRN
Qty: 1 INHALER | Refills: 3 | Status: SHIPPED | OUTPATIENT
Start: 2021-02-22 | End: 2021-04-19

## 2021-02-22 RX ORDER — ALPRAZOLAM 0.5 MG/1
TABLET ORAL
Qty: 90 TABLET | Refills: 0 | Status: SHIPPED | OUTPATIENT
Start: 2021-02-22 | End: 2021-05-03 | Stop reason: SDUPTHER

## 2021-03-16 RX ORDER — FLUTICASONE PROPIONATE 50 MCG
SPRAY, SUSPENSION (ML) NASAL
Qty: 1 BOTTLE | Refills: 1 | Status: SHIPPED | OUTPATIENT
Start: 2021-03-16 | End: 2021-04-16

## 2021-03-26 DIAGNOSIS — F51.01 PRIMARY INSOMNIA: ICD-10-CM

## 2021-03-29 RX ORDER — ZOLPIDEM TARTRATE 10 MG/1
TABLET ORAL
Qty: 30 TABLET | Refills: 0 | Status: SHIPPED | OUTPATIENT
Start: 2021-03-29 | End: 2021-05-03 | Stop reason: SDUPTHER

## 2021-04-16 RX ORDER — FLUTICASONE PROPIONATE 50 MCG
SPRAY, SUSPENSION (ML) NASAL
Qty: 1 BOTTLE | Refills: 1 | Status: SHIPPED | OUTPATIENT
Start: 2021-04-16 | End: 2021-05-04 | Stop reason: SDUPTHER

## 2021-04-19 RX ORDER — ALBUTEROL SULFATE 90 UG/1
AEROSOL, METERED RESPIRATORY (INHALATION)
Qty: 6.7 INHALER | Refills: 3 | Status: SHIPPED | OUTPATIENT
Start: 2021-04-19 | End: 2021-11-12 | Stop reason: SDUPTHER

## 2021-04-27 RX ORDER — VERAPAMIL HYDROCHLORIDE 240 MG/1
TABLET, FILM COATED, EXTENDED RELEASE ORAL
Qty: 180 TABLET | Refills: 3 | Status: SHIPPED | OUTPATIENT
Start: 2021-04-27 | End: 2021-10-01 | Stop reason: SDUPTHER

## 2021-05-03 DIAGNOSIS — F51.01 PRIMARY INSOMNIA: ICD-10-CM

## 2021-05-03 DIAGNOSIS — F41.0 PANIC ATTACKS: ICD-10-CM

## 2021-05-03 RX ORDER — ZOLPIDEM TARTRATE 10 MG/1
TABLET ORAL
Qty: 30 TABLET | Refills: 0 | Status: SHIPPED | OUTPATIENT
Start: 2021-05-03 | End: 2021-06-01

## 2021-05-03 RX ORDER — ALPRAZOLAM 0.5 MG/1
TABLET ORAL
Qty: 90 TABLET | Refills: 0 | Status: SHIPPED | OUTPATIENT
Start: 2021-05-03 | End: 2021-08-02

## 2021-05-04 RX ORDER — FLUTICASONE PROPIONATE 50 MCG
2 SPRAY, SUSPENSION (ML) NASAL DAILY
Qty: 3 BOTTLE | Refills: 3 | Status: SHIPPED | OUTPATIENT
Start: 2021-05-04 | End: 2022-04-04

## 2021-05-11 ENCOUNTER — OFFICE VISIT (OUTPATIENT)
Dept: PRIMARY CARE CLINIC | Age: 81
End: 2021-05-11
Payer: MEDICARE

## 2021-05-11 VITALS
WEIGHT: 189 LBS | HEIGHT: 63 IN | BODY MASS INDEX: 33.49 KG/M2 | HEART RATE: 65 BPM | SYSTOLIC BLOOD PRESSURE: 120 MMHG | TEMPERATURE: 96.9 F | DIASTOLIC BLOOD PRESSURE: 62 MMHG | RESPIRATION RATE: 16 BRPM | OXYGEN SATURATION: 98 %

## 2021-05-11 DIAGNOSIS — F41.0 PANIC ATTACKS: ICD-10-CM

## 2021-05-11 DIAGNOSIS — M19.90 ARTHRITIS: ICD-10-CM

## 2021-05-11 DIAGNOSIS — F41.9 ANXIETY: ICD-10-CM

## 2021-05-11 DIAGNOSIS — I25.119 CORONARY ARTERY DISEASE WITH ANGINA PECTORIS, UNSPECIFIED VESSEL OR LESION TYPE, UNSPECIFIED WHETHER NATIVE OR TRANSPLANTED HEART (HCC): ICD-10-CM

## 2021-05-11 DIAGNOSIS — I10 ESSENTIAL HYPERTENSION: Primary | ICD-10-CM

## 2021-05-11 DIAGNOSIS — F51.01 PRIMARY INSOMNIA: ICD-10-CM

## 2021-05-11 PROCEDURE — G8427 DOCREV CUR MEDS BY ELIG CLIN: HCPCS | Performed by: FAMILY MEDICINE

## 2021-05-11 PROCEDURE — 4040F PNEUMOC VAC/ADMIN/RCVD: CPT | Performed by: FAMILY MEDICINE

## 2021-05-11 PROCEDURE — G8417 CALC BMI ABV UP PARAM F/U: HCPCS | Performed by: FAMILY MEDICINE

## 2021-05-11 PROCEDURE — 1036F TOBACCO NON-USER: CPT | Performed by: FAMILY MEDICINE

## 2021-05-11 PROCEDURE — G8399 PT W/DXA RESULTS DOCUMENT: HCPCS | Performed by: FAMILY MEDICINE

## 2021-05-11 PROCEDURE — 1123F ACP DISCUSS/DSCN MKR DOCD: CPT | Performed by: FAMILY MEDICINE

## 2021-05-11 PROCEDURE — 1090F PRES/ABSN URINE INCON ASSESS: CPT | Performed by: FAMILY MEDICINE

## 2021-05-11 PROCEDURE — 99213 OFFICE O/P EST LOW 20 MIN: CPT | Performed by: FAMILY MEDICINE

## 2021-05-11 RX ORDER — MULTIVIT-MIN/IRON/FOLIC ACID/K 18-600-40
CAPSULE ORAL
COMMUNITY

## 2021-05-11 RX ORDER — ZINC GLUCONATE 50 MG
50 TABLET ORAL DAILY
COMMUNITY

## 2021-05-11 ASSESSMENT — ENCOUNTER SYMPTOMS
NAUSEA: 0
BACK PAIN: 1
COUGH: 0
ABDOMINAL PAIN: 0
COLOR CHANGE: 0
DIARRHEA: 0
EYE DISCHARGE: 0
VOMITING: 0
WHEEZING: 0

## 2021-05-11 ASSESSMENT — PATIENT HEALTH QUESTIONNAIRE - PHQ9
SUM OF ALL RESPONSES TO PHQ9 QUESTIONS 1 & 2: 2
SUM OF ALL RESPONSES TO PHQ QUESTIONS 1-9: 2
SUM OF ALL RESPONSES TO PHQ QUESTIONS 1-9: 2
1. LITTLE INTEREST OR PLEASURE IN DOING THINGS: 1

## 2021-05-11 NOTE — PROGRESS NOTES
SUBJECTIVE:    Patient ID: Iveth Castellanos is a 80 y.o. female. HPI:   Patient is here today for 6-month follow-up. She has a history of anxiety and is currently on Xanax. She is tolerating this well. She does not need a refill on this today. She is not had to increase the dosage or the frequency of this recently. She does have a history of insomnia. She is currently on Ambien. She is tolerating this well. She states that she is not having any side effects of the Ambien. She does have a history of arthritis as well. She is followed by pain management and does receive pain medication from them. She does struggle with constipation due to the pain med patient. She states they are currently doing injections in her back to help with her back pain. She does have a history of hypertension. She is taking her blood pressure medication as prescribed. She does monitor her blood pressure at home and it stays well controlled. She denies any chest pain or palpitations. Blood pressures well controlled in office today.     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 Aliya Tamez found on scope    Seasonal allergies     Shortness of breath on exertion     Sneezing     Urinary frequency    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    Whooping cough     9 mos old      Current Outpatient Medications on File Prior to Visit   Medication Sig Dispense Refill    zinc gluconate 50 MG tablet Take 50 mg by mouth daily      Cholecalciferol (VITAMIN D) 50 MCG (2000 UT) CAPS capsule Take by mouth      fluticasone (FLONASE) 50 MCG/ACT nasal spray 2 sprays by Nasal route daily 3 Bottle 3    zolpidem (AMBIEN) 10 MG tablet TAKE ONE TABLET BY MOUTH NIGHTLY AS NEEDED FOR SLEEP FOR UP TO THIRTY DAYS 30 tablet 0    ALPRAZolam (XANAX) 0.5 MG tablet TAKE 1 TABLET BY MOUTH 3 TIMES A DAY AS NEEDED FOR SLEEP OR ANXIETY 90 tablet 0    verapamil (CALAN SR) 240 MG extended release tablet TAKE 1 TABLET BY MOUTH TWICE A  tablet 3    albuterol sulfate  (90 Base) MCG/ACT inhaler TAKE 2 PUFFS BY MOUTH EVERY 6 HOURS AS NEEDED FOR WHEEZE 6.7 Inhaler 3    mirabegron (MYRBETRIQ) 50 MG TB24 Take 50 mg by mouth daily 30 tablet 5    meloxicam (MOBIC) 15 MG tablet Take 1 tablet by mouth daily 90 tablet 2    indapamide (LOZOL) 2.5 MG tablet TAKE 1 TABLET BY MOUTH TWICE A  tablet 3    KLOR-CON M20 20 MEQ extended release tablet TAKE 1 TABLET BY MOUTH TWICE A  tablet 3    amLODIPine (NORVASC) 10 MG tablet Take 1 tablet by mouth daily 90 tablet 3    losartan-hydroCHLOROthiazide (HYZAAR) 100-25 MG per tablet Take 1 tablet by mouth daily 90 tablet 3    atorvastatin (LIPITOR) 40 MG tablet Take 1 tablet by mouth daily 90 tablet 3    citalopram (CELEXA) 40 MG tablet Take 1 tablet by mouth daily 90 tablet 3    DULoxetine (CYMBALTA) 60 MG extended release capsule TAKE 1 CAPSULE BY MOUTH EVERY DAY 90 capsule 4    nystatin (MYCOSTATIN) 779101 UNIT/GM powder Apply 3 times daily. 1 Bottle 1    gabapentin (NEURONTIN) 100 MG capsule Take 200 mg by mouth 2 times daily.  polyethylene glycol (GLYCOLAX) powder DISSOLVE 1 CAPFUL (17 GRAMS) IN 8 OUNCES WATER TWICE A DAY 1054 g 1    HYDROcodone-acetaminophen (NORCO) 7.5-325 MG per tablet Take one up to three times a day as needed for arthritis flares 30 tablet 0    hydrOXYzine (ATARAX) 50 MG tablet TAKE 1 TABLET BY MOUTH NIGHTLY AS NEEDED (SLEEP). 90 tablet 3    Pediatric Multivitamins-Fl (MULT-VITAMIN/FLUORIDE PO) Take  by mouth.  calcium carbonate (OSCAL) 500 MG TABS tablet Take 500 mg by mouth daily. No current facility-administered medications on file prior to visit. Allergies   Allergen Reactions    Pollen Extract      Nasal congestion       Review of Systems   Constitutional: Negative for activity change, appetite change and fever. HENT: Negative for congestion and nosebleeds. Eyes: Negative for discharge. Respiratory: Negative for cough and wheezing. Cardiovascular: Negative for chest pain and leg swelling. Gastrointestinal: Negative for abdominal pain, diarrhea, nausea and vomiting. Genitourinary: Negative for difficulty urinating, frequency and urgency. Musculoskeletal: Positive for arthralgias and back pain. Negative for gait problem. Skin: Negative for color change and rash. Neurological: Negative for dizziness and headaches. Hematological: Does not bruise/bleed easily. Psychiatric/Behavioral: Negative for sleep disturbance and suicidal ideas. OBJECTIVE:    Physical Exam  Vitals signs reviewed. Constitutional:       General: She is not in acute distress. Appearance: Normal appearance. She is well-developed. She is not diaphoretic. HENT:      Head: Normocephalic and atraumatic. Right Ear: External ear normal.      Left Ear: External ear normal.   Neck:      Musculoskeletal: Normal range of motion and neck supple. Cardiovascular:      Rate and Rhythm: Normal rate and regular rhythm. Pulses: Normal pulses. Heart sounds: Normal heart sounds. No murmur. Pulmonary:      Effort: Pulmonary effort is normal. No respiratory distress. Breath sounds: Normal breath sounds. Skin:     General: Skin is warm and dry. Neurological:      General: No focal deficit present. Mental Status: She is alert and oriented to person, place, and time. Mental status is at baseline.    Psychiatric:         Mood and Affect: Mood normal.         Behavior: Behavior normal. Thought Content: Thought content normal.         Judgment: Judgment normal.        /62 (Site: Right Upper Arm, Position: Sitting, Cuff Size: Large Adult)   Pulse 65   Temp 96.9 °F (36.1 °C) (Temporal)   Resp 16   Ht 5' 3.19\" (1.605 m)   Wt 189 lb (85.7 kg)   SpO2 98%   BMI 33.28 kg/m²      ASSESSMENT:    Delio Chavez was seen today for 6 month follow-up. Diagnoses and all orders for this visit:    Essential hypertension    Primary insomnia    Anxiety    Arthritis    Panic attacks    Coronary artery disease with angina pectoris, unspecified vessel or lesion type, unspecified whether native or transplanted heart ([facility] [address]        PLAN:    Continue current medications. She does not need refills on anything today. Follow-up with us in 6 months for Medicare annual wellness and checkup and fasting labs unless needed sooner. EMR Dragon/transcription disclaimer:  Much of this encounter note is electronic transcription/translation of spoken language toprinted texts. The electronic translation of spoken language may be erroneous, or at times, nonsensical words or phrases may be inadvertently transcribed.   Although I have reviewed the note for such errors, some may stillexist.

## 2021-05-29 DIAGNOSIS — F51.01 PRIMARY INSOMNIA: ICD-10-CM

## 2021-06-01 RX ORDER — ZOLPIDEM TARTRATE 10 MG/1
TABLET ORAL
Qty: 30 TABLET | Refills: 0 | Status: SHIPPED | OUTPATIENT
Start: 2021-06-01 | End: 2021-07-02

## 2021-06-26 DIAGNOSIS — F41.0 PANIC ATTACKS: ICD-10-CM

## 2021-06-28 RX ORDER — CITALOPRAM 40 MG/1
TABLET ORAL
Qty: 90 TABLET | Refills: 3 | Status: SHIPPED | OUTPATIENT
Start: 2021-06-28

## 2021-07-01 DIAGNOSIS — F51.01 PRIMARY INSOMNIA: ICD-10-CM

## 2021-07-02 RX ORDER — ZOLPIDEM TARTRATE 10 MG/1
TABLET ORAL
Qty: 30 TABLET | Refills: 0 | Status: SHIPPED | OUTPATIENT
Start: 2021-07-02 | End: 2021-08-02

## 2021-07-19 ENCOUNTER — TRANSCRIBE ORDERS (OUTPATIENT)
Dept: ADMINISTRATIVE | Facility: HOSPITAL | Age: 81
End: 2021-07-19

## 2021-07-19 DIAGNOSIS — M54.16 LUMBAR RADICULOPATHY: Primary | ICD-10-CM

## 2021-07-31 DIAGNOSIS — F51.01 PRIMARY INSOMNIA: ICD-10-CM

## 2021-07-31 DIAGNOSIS — F41.0 PANIC ATTACKS: ICD-10-CM

## 2021-08-02 RX ORDER — ZOLPIDEM TARTRATE 10 MG/1
10 TABLET ORAL NIGHTLY PRN
Qty: 30 TABLET | Refills: 0 | Status: SHIPPED | OUTPATIENT
Start: 2021-08-02 | End: 2021-08-30

## 2021-08-02 RX ORDER — ALPRAZOLAM 0.5 MG/1
0.5 TABLET ORAL 3 TIMES DAILY PRN
Qty: 90 TABLET | Refills: 0 | Status: SHIPPED | OUTPATIENT
Start: 2021-08-02 | End: 2021-09-01

## 2021-08-05 ENCOUNTER — HOSPITAL ENCOUNTER (OUTPATIENT)
Dept: MRI IMAGING | Facility: HOSPITAL | Age: 81
Discharge: HOME | End: 2021-08-05
Admitting: NURSE PRACTITIONER

## 2021-08-05 DIAGNOSIS — M54.16 LUMBAR RADICULOPATHY: ICD-10-CM

## 2021-08-05 PROCEDURE — 72148 MRI LUMBAR SPINE W/O DYE: CPT

## 2021-08-29 DIAGNOSIS — F51.01 PRIMARY INSOMNIA: ICD-10-CM

## 2021-08-30 RX ORDER — ZOLPIDEM TARTRATE 10 MG/1
TABLET ORAL
Qty: 30 TABLET | Refills: 0 | Status: SHIPPED | OUTPATIENT
Start: 2021-08-30 | End: 2021-09-30

## 2021-09-20 ENCOUNTER — APPOINTMENT (OUTPATIENT)
Dept: CT IMAGING | Age: 81
End: 2021-09-20
Payer: MEDICARE

## 2021-09-20 ENCOUNTER — HOSPITAL ENCOUNTER (OUTPATIENT)
Age: 81
Setting detail: OBSERVATION
Discharge: HOME | End: 2021-09-21
Attending: EMERGENCY MEDICINE | Admitting: STUDENT IN AN ORGANIZED HEALTH CARE EDUCATION/TRAINING PROGRAM
Payer: MEDICARE

## 2021-09-20 ENCOUNTER — TELEPHONE (OUTPATIENT)
Dept: PRIMARY CARE CLINIC | Age: 81
End: 2021-09-20

## 2021-09-20 DIAGNOSIS — R07.81 RIB PAIN ON RIGHT SIDE: Primary | ICD-10-CM

## 2021-09-20 DIAGNOSIS — S27.0XXA TRAUMATIC PNEUMOTHORAX, INITIAL ENCOUNTER: Primary | ICD-10-CM

## 2021-09-20 DIAGNOSIS — S22.41XA CLOSED FRACTURE OF MULTIPLE RIBS OF RIGHT SIDE, INITIAL ENCOUNTER: ICD-10-CM

## 2021-09-20 PROBLEM — J93.9 PNEUMOTHORAX: Status: ACTIVE | Noted: 2021-09-20

## 2021-09-20 LAB
ALBUMIN SERPL-MCNC: 4 G/DL (ref 3.5–5.2)
ALP BLD-CCNC: 82 U/L (ref 35–104)
ALT SERPL-CCNC: 24 U/L (ref 5–33)
ANION GAP SERPL CALCULATED.3IONS-SCNC: 10 MMOL/L (ref 7–19)
AST SERPL-CCNC: 28 U/L (ref 5–32)
BASOPHILS ABSOLUTE: 0 K/UL (ref 0–0.2)
BASOPHILS RELATIVE PERCENT: 0.4 % (ref 0–1)
BILIRUB SERPL-MCNC: 0.5 MG/DL (ref 0.2–1.2)
BUN BLDV-MCNC: 21 MG/DL (ref 8–23)
CALCIUM SERPL-MCNC: 9.6 MG/DL (ref 8.8–10.2)
CHLORIDE BLD-SCNC: 100 MMOL/L (ref 98–111)
CO2: 25 MMOL/L (ref 22–29)
CREAT SERPL-MCNC: 0.7 MG/DL (ref 0.5–0.9)
EOSINOPHILS ABSOLUTE: 0.3 K/UL (ref 0–0.6)
EOSINOPHILS RELATIVE PERCENT: 3.2 % (ref 0–5)
GFR AFRICAN AMERICAN: >59
GFR NON-AFRICAN AMERICAN: >60
GLUCOSE BLD-MCNC: 109 MG/DL (ref 74–109)
HCT VFR BLD CALC: 45.2 % (ref 37–47)
HEMOGLOBIN: 14 G/DL (ref 12–16)
IMMATURE GRANULOCYTES #: 0 K/UL
LYMPHOCYTES ABSOLUTE: 1.3 K/UL (ref 1.1–4.5)
LYMPHOCYTES RELATIVE PERCENT: 13.6 % (ref 20–40)
MAGNESIUM: 1.9 MG/DL (ref 1.6–2.4)
MCH RBC QN AUTO: 29.2 PG (ref 27–31)
MCHC RBC AUTO-ENTMCNC: 31 G/DL (ref 33–37)
MCV RBC AUTO: 94.2 FL (ref 81–99)
MONOCYTES ABSOLUTE: 0.9 K/UL (ref 0–0.9)
MONOCYTES RELATIVE PERCENT: 8.9 % (ref 0–10)
NEUTROPHILS ABSOLUTE: 7 K/UL (ref 1.5–7.5)
NEUTROPHILS RELATIVE PERCENT: 73.7 % (ref 50–65)
PDW BLD-RTO: 14.6 % (ref 11.5–14.5)
PLATELET # BLD: 334 K/UL (ref 130–400)
PMV BLD AUTO: 9.3 FL (ref 9.4–12.3)
POTASSIUM SERPL-SCNC: 3.9 MMOL/L (ref 3.5–5)
RBC # BLD: 4.8 M/UL (ref 4.2–5.4)
SARS-COV-2, NAAT: NOT DETECTED
SODIUM BLD-SCNC: 135 MMOL/L (ref 136–145)
TOTAL PROTEIN: 7 G/DL (ref 6.6–8.7)
WBC # BLD: 9.6 K/UL (ref 4.8–10.8)

## 2021-09-20 PROCEDURE — 6370000000 HC RX 637 (ALT 250 FOR IP)

## 2021-09-20 PROCEDURE — 36415 COLL VENOUS BLD VENIPUNCTURE: CPT

## 2021-09-20 PROCEDURE — G0378 HOSPITAL OBSERVATION PER HR: HCPCS

## 2021-09-20 PROCEDURE — 85025 COMPLETE CBC W/AUTO DIFF WBC: CPT

## 2021-09-20 PROCEDURE — 83735 ASSAY OF MAGNESIUM: CPT

## 2021-09-20 PROCEDURE — 71260 CT THORAX DX C+: CPT

## 2021-09-20 PROCEDURE — 87635 SARS-COV-2 COVID-19 AMP PRB: CPT

## 2021-09-20 PROCEDURE — 6360000004 HC RX CONTRAST MEDICATION: Performed by: EMERGENCY MEDICINE

## 2021-09-20 PROCEDURE — 99283 EMERGENCY DEPT VISIT LOW MDM: CPT

## 2021-09-20 PROCEDURE — 93005 ELECTROCARDIOGRAM TRACING: CPT | Performed by: EMERGENCY MEDICINE

## 2021-09-20 PROCEDURE — 80053 COMPREHEN METABOLIC PANEL: CPT

## 2021-09-20 RX ORDER — HYDROCHLOROTHIAZIDE 25 MG/1
25 TABLET ORAL DAILY
Status: DISCONTINUED | OUTPATIENT
Start: 2021-09-21 | End: 2021-09-21 | Stop reason: HOSPADM

## 2021-09-20 RX ORDER — SODIUM CHLORIDE 9 MG/ML
INJECTION, SOLUTION INTRAVENOUS CONTINUOUS
Status: DISCONTINUED | OUTPATIENT
Start: 2021-09-20 | End: 2021-09-21 | Stop reason: HOSPADM

## 2021-09-20 RX ORDER — ONDANSETRON 4 MG/1
4 TABLET, ORALLY DISINTEGRATING ORAL EVERY 8 HOURS PRN
Status: DISCONTINUED | OUTPATIENT
Start: 2021-09-20 | End: 2021-09-21 | Stop reason: HOSPADM

## 2021-09-20 RX ORDER — ZINC SULFATE 50(220)MG
50 CAPSULE ORAL DAILY
Status: DISCONTINUED | OUTPATIENT
Start: 2021-09-21 | End: 2021-09-21 | Stop reason: HOSPADM

## 2021-09-20 RX ORDER — POLYETHYLENE GLYCOL 3350 17 G/17G
17 POWDER, FOR SOLUTION ORAL DAILY PRN
Status: DISCONTINUED | OUTPATIENT
Start: 2021-09-20 | End: 2021-09-21 | Stop reason: HOSPADM

## 2021-09-20 RX ORDER — SODIUM CHLORIDE 0.9 % (FLUSH) 0.9 %
5-40 SYRINGE (ML) INJECTION PRN
Status: DISCONTINUED | OUTPATIENT
Start: 2021-09-20 | End: 2021-09-21 | Stop reason: HOSPADM

## 2021-09-20 RX ORDER — LOSARTAN POTASSIUM 100 MG/1
100 TABLET ORAL DAILY
Status: DISCONTINUED | OUTPATIENT
Start: 2021-09-21 | End: 2021-09-21 | Stop reason: HOSPADM

## 2021-09-20 RX ORDER — DULOXETIN HYDROCHLORIDE 20 MG/1
40 CAPSULE, DELAYED RELEASE ORAL DAILY
Status: DISCONTINUED | OUTPATIENT
Start: 2021-09-21 | End: 2021-09-21 | Stop reason: HOSPADM

## 2021-09-20 RX ORDER — HYDROCHLOROTHIAZIDE 25 MG/1
50 TABLET ORAL DAILY
Status: DISCONTINUED | OUTPATIENT
Start: 2021-09-21 | End: 2021-09-21 | Stop reason: HOSPADM

## 2021-09-20 RX ORDER — GABAPENTIN 100 MG/1
200 CAPSULE ORAL 2 TIMES DAILY
Status: DISCONTINUED | OUTPATIENT
Start: 2021-09-20 | End: 2021-09-21 | Stop reason: HOSPADM

## 2021-09-20 RX ORDER — HYDROCODONE BITARTRATE AND ACETAMINOPHEN 7.5; 325 MG/1; MG/1
1 TABLET ORAL EVERY 6 HOURS PRN
Status: DISCONTINUED | OUTPATIENT
Start: 2021-09-20 | End: 2021-09-21 | Stop reason: HOSPADM

## 2021-09-20 RX ORDER — TIZANIDINE 4 MG/1
2 TABLET ORAL EVERY 6 HOURS PRN
Status: DISCONTINUED | OUTPATIENT
Start: 2021-09-20 | End: 2021-09-21 | Stop reason: HOSPADM

## 2021-09-20 RX ORDER — LOSARTAN POTASSIUM AND HYDROCHLOROTHIAZIDE 25; 100 MG/1; MG/1
TABLET ORAL
Qty: 90 TABLET | Refills: 3 | Status: SHIPPED | OUTPATIENT
Start: 2021-09-20

## 2021-09-20 RX ORDER — MULTIVITAMIN WITH IRON
1 TABLET ORAL DAILY
Status: DISCONTINUED | OUTPATIENT
Start: 2021-09-21 | End: 2021-09-21 | Stop reason: HOSPADM

## 2021-09-20 RX ORDER — POLYETHYLENE GLYCOL 3350 17 G/17G
17 POWDER, FOR SOLUTION ORAL DAILY
Status: DISCONTINUED | OUTPATIENT
Start: 2021-09-21 | End: 2021-09-21 | Stop reason: HOSPADM

## 2021-09-20 RX ORDER — VERAPAMIL HYDROCHLORIDE 240 MG/1
240 TABLET, FILM COATED, EXTENDED RELEASE ORAL 2 TIMES DAILY
Status: DISCONTINUED | OUTPATIENT
Start: 2021-09-21 | End: 2021-09-21 | Stop reason: HOSPADM

## 2021-09-20 RX ORDER — AMLODIPINE BESYLATE 10 MG/1
TABLET ORAL
Qty: 90 TABLET | Refills: 3 | Status: SHIPPED | OUTPATIENT
Start: 2021-09-20

## 2021-09-20 RX ORDER — SODIUM CHLORIDE 9 MG/ML
25 INJECTION, SOLUTION INTRAVENOUS PRN
Status: DISCONTINUED | OUTPATIENT
Start: 2021-09-20 | End: 2021-09-21 | Stop reason: HOSPADM

## 2021-09-20 RX ORDER — TROSPIUM CHLORIDE 20 MG/1
20 TABLET, FILM COATED ORAL
Status: DISCONTINUED | OUTPATIENT
Start: 2021-09-21 | End: 2021-09-21 | Stop reason: HOSPADM

## 2021-09-20 RX ORDER — ONDANSETRON 2 MG/ML
4 INJECTION INTRAMUSCULAR; INTRAVENOUS EVERY 6 HOURS PRN
Status: DISCONTINUED | OUTPATIENT
Start: 2021-09-20 | End: 2021-09-21 | Stop reason: HOSPADM

## 2021-09-20 RX ORDER — HYDROCODONE BITARTRATE AND ACETAMINOPHEN 7.5; 325 MG/1; MG/1
1 TABLET ORAL ONCE
Status: COMPLETED | OUTPATIENT
Start: 2021-09-20 | End: 2021-09-20

## 2021-09-20 RX ORDER — SODIUM CHLORIDE 0.9 % (FLUSH) 0.9 %
5-40 SYRINGE (ML) INJECTION EVERY 12 HOURS SCHEDULED
Status: DISCONTINUED | OUTPATIENT
Start: 2021-09-20 | End: 2021-09-21 | Stop reason: HOSPADM

## 2021-09-20 RX ORDER — FLUTICASONE PROPIONATE 50 MCG
2 SPRAY, SUSPENSION (ML) NASAL DAILY
Status: DISCONTINUED | OUTPATIENT
Start: 2021-09-21 | End: 2021-09-21 | Stop reason: HOSPADM

## 2021-09-20 RX ORDER — AMLODIPINE BESYLATE 10 MG/1
10 TABLET ORAL DAILY
Status: DISCONTINUED | OUTPATIENT
Start: 2021-09-21 | End: 2021-09-21 | Stop reason: HOSPADM

## 2021-09-20 RX ORDER — ACETAMINOPHEN 650 MG/1
650 SUPPOSITORY RECTAL EVERY 6 HOURS PRN
Status: DISCONTINUED | OUTPATIENT
Start: 2021-09-20 | End: 2021-09-21 | Stop reason: HOSPADM

## 2021-09-20 RX ORDER — HYDROCODONE BITARTRATE AND ACETAMINOPHEN 7.5; 325 MG/1; MG/1
1 TABLET ORAL EVERY 8 HOURS PRN
Status: DISCONTINUED | OUTPATIENT
Start: 2021-09-20 | End: 2021-09-21

## 2021-09-20 RX ORDER — VITAMIN B COMPLEX
2000 TABLET ORAL DAILY
Status: DISCONTINUED | OUTPATIENT
Start: 2021-09-21 | End: 2021-09-21 | Stop reason: HOSPADM

## 2021-09-20 RX ORDER — LOSARTAN POTASSIUM AND HYDROCHLOROTHIAZIDE 25; 100 MG/1; MG/1
1 TABLET ORAL DAILY
Status: DISCONTINUED | OUTPATIENT
Start: 2021-09-21 | End: 2021-09-20 | Stop reason: CLARIF

## 2021-09-20 RX ORDER — POLYETHYLENE GLYCOL 3350 17 G/17G
17 POWDER, FOR SOLUTION ORAL DAILY
Status: DISCONTINUED | OUTPATIENT
Start: 2021-09-21 | End: 2021-09-20 | Stop reason: CLARIF

## 2021-09-20 RX ORDER — CALCIUM CARBONATE 500(1250)
500 TABLET ORAL DAILY
Status: DISCONTINUED | OUTPATIENT
Start: 2021-09-21 | End: 2021-09-21 | Stop reason: HOSPADM

## 2021-09-20 RX ORDER — ALBUTEROL SULFATE 90 UG/1
2 AEROSOL, METERED RESPIRATORY (INHALATION) EVERY 4 HOURS PRN
Status: DISCONTINUED | OUTPATIENT
Start: 2021-09-20 | End: 2021-09-20 | Stop reason: CLARIF

## 2021-09-20 RX ORDER — POTASSIUM CHLORIDE 750 MG/1
10 TABLET, EXTENDED RELEASE ORAL
Status: DISCONTINUED | OUTPATIENT
Start: 2021-09-21 | End: 2021-09-21 | Stop reason: HOSPADM

## 2021-09-20 RX ORDER — ALBUTEROL SULFATE 2.5 MG/3ML
2.5 SOLUTION RESPIRATORY (INHALATION) EVERY 4 HOURS PRN
Status: DISCONTINUED | OUTPATIENT
Start: 2021-09-20 | End: 2021-09-21 | Stop reason: HOSPADM

## 2021-09-20 RX ORDER — ACETAMINOPHEN 325 MG/1
650 TABLET ORAL EVERY 6 HOURS PRN
Status: DISCONTINUED | OUTPATIENT
Start: 2021-09-20 | End: 2021-09-21 | Stop reason: HOSPADM

## 2021-09-20 RX ORDER — ATORVASTATIN CALCIUM 40 MG/1
40 TABLET, FILM COATED ORAL DAILY
Status: DISCONTINUED | OUTPATIENT
Start: 2021-09-21 | End: 2021-09-21 | Stop reason: HOSPADM

## 2021-09-20 RX ORDER — MELOXICAM 7.5 MG/1
15 TABLET ORAL DAILY
Status: DISCONTINUED | OUTPATIENT
Start: 2021-09-21 | End: 2021-09-21

## 2021-09-20 RX ORDER — CITALOPRAM 20 MG/1
40 TABLET ORAL DAILY
Status: DISCONTINUED | OUTPATIENT
Start: 2021-09-21 | End: 2021-09-21 | Stop reason: HOSPADM

## 2021-09-20 RX ADMIN — HYDROCODONE BITARTRATE AND ACETAMINOPHEN 1 TABLET: 7.5; 325 TABLET ORAL at 20:18

## 2021-09-20 RX ADMIN — IOPAMIDOL 90 ML: 755 INJECTION, SOLUTION INTRAVENOUS at 19:20

## 2021-09-20 ASSESSMENT — ENCOUNTER SYMPTOMS
EYE REDNESS: 0
DIARRHEA: 0
SHORTNESS OF BREATH: 1
EYE PAIN: 0
VOICE CHANGE: 0
COUGH: 0
VOMITING: 0
ABDOMINAL PAIN: 0
RHINORRHEA: 0

## 2021-09-20 ASSESSMENT — PAIN SCALES - GENERAL: PAINLEVEL_OUTOF10: 9

## 2021-09-20 NOTE — ED PROVIDER NOTES
[facility] EMERGENCY DEPT  EMERGENCY DEPARTMENT ENCOUNTER      Pt Name: Emy Cleaning  MRN: [id number]  [place] 1940  Date of evaluation: 9/20/2021  Provider: Elisabet George MD    CHIEF COMPLAINT       Chief Complaint   Patient presents with    Chest Injury     fell hitting bed post xray done today told she had 3 rib fx and a pneumo         HISTORY OF PRESENT ILLNESS   (Location/Symptom, Timing/Onset,Context/Setting, Quality, Duration, Modifying Factors, Severity)  Note limiting factors. Emy Cleaning is a 80 y.o. female who presents to the emergency department for evaluation after outpatient x-ray showed rib fractures with small pneumothorax. Patient fell when she got up to use restroom in the middle of the night 2 or 3 nights ago. Has had persistent pain on the posterior lateral right chest wall since then with occasional shortness of breath. Saw her primary care doctor today who ordered an x-ray that showed 3 rib fractures with associated small pneumothorax. HPI    NursingNotes were reviewed. REVIEW OF SYSTEMS    (2-9 systems for level 4, 10 or more for level 5)     Review of Systems   Constitutional: Negative for fatigue and fever. HENT: Negative for congestion, rhinorrhea and voice change. Eyes: Negative for pain and redness. Respiratory: Positive for shortness of breath. Negative for cough. Cardiovascular: Positive for chest pain. Gastrointestinal: Negative for abdominal pain, diarrhea and vomiting. Endocrine: Negative. Genitourinary: Negative. Musculoskeletal: Negative for arthralgias and gait problem. Skin: Negative for rash and wound. Neurological: Negative for weakness and headaches. Hematological: Negative. Psychiatric/Behavioral: Negative. All other systems reviewed and are negative. A complete review of systems was performed and is negative except as noted above in the HPI.        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 Nathan Mera found on scope    Seasonal allergies     Shortness of breath on exertion     Sneezing     Urinary frequency    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    Whooping cough     9 mos old         SURGICAL HISTORY       Past Surgical History:   Procedure Laterality Date    APPENDECTOMY      BREAST SURGERY      Biopsy x2    CATARACT REMOVAL Bilateral 4/2015,5/2015    CHOLECYSTECTOMY      COLONOSCOPY  10-    tubular adenoma removed, mild inflammation, needs repeat scope in 2014, Dr. James Murphy Right 04/2014         CURRENT MEDICATIONS       Previous Medications    ALBUTEROL SULFATE  (90 BASE) MCG/ACT INHALER    TAKE 2 PUFFS BY MOUTH EVERY 6 HOURS AS NEEDED FOR WHEEZE    AMLODIPINE (NORVASC) 10 MG TABLET    TAKE 1 TABLET BY MOUTH EVERY DAY    ATORVASTATIN (LIPITOR) 40 MG TABLET    Take 1 tablet by mouth daily    CALCIUM CARBONATE (OSCAL) 500 MG TABS TABLET    Take 500 mg by mouth daily. CHOLECALCIFEROL (VITAMIN D) 50 MCG (2000 UT) CAPS CAPSULE    Take by mouth    CITALOPRAM (CELEXA) 40 MG TABLET    TAKE 1 TABLET BY MOUTH EVERY DAY    DULOXETINE (CYMBALTA) 60 MG EXTENDED RELEASE CAPSULE    TAKE 1 CAPSULE BY MOUTH EVERY DAY    FLUTICASONE (FLONASE) 50 MCG/ACT NASAL SPRAY    2 sprays by Nasal route daily    GABAPENTIN (NEURONTIN) 100 MG CAPSULE    Take 200 mg by mouth 2 times daily.     HYDROCODONE-ACETAMINOPHEN (NORCO) 7.5-325 MG PER TABLET    Take one up to three times a day as needed for arthritis flares    HYDROXYZINE (ATARAX) 50 MG TABLET    TAKE 1 TABLET BY MOUTH NIGHTLY AS NEEDED (SLEEP). INDAPAMIDE (LOZOL) 2.5 MG TABLET    TAKE 1 TABLET BY MOUTH TWICE A DAY    KLOR-CON M20 20 MEQ EXTENDED RELEASE TABLET    TAKE 1 TABLET BY MOUTH TWICE A DAY    LOSARTAN-HYDROCHLOROTHIAZIDE (HYZAAR) 100-25 MG PER TABLET    TAKE 1 TABLET BY MOUTH EVERY DAY    MELOXICAM (MOBIC) 15 MG TABLET    Take 1 tablet by mouth daily    MIRABEGRON (MYRBETRIQ) 50 MG TB24    Take 50 mg by mouth daily    NYSTATIN (MYCOSTATIN) 646076 UNIT/GM POWDER    Apply 3 times daily. PEDIATRIC MULTIVITAMINS-FL (MULT-VITAMIN/FLUORIDE PO)    Take  by mouth. POLYETHYLENE GLYCOL (GLYCOLAX) POWDER    DISSOLVE 1 CAPFUL (17 GRAMS) IN 8 OUNCES WATER TWICE A DAY    VERAPAMIL (CALAN SR) 240 MG EXTENDED RELEASE TABLET    TAKE 1 TABLET BY MOUTH TWICE A DAY    ZINC GLUCONATE 50 MG TABLET    Take 50 mg by mouth daily    ZOLPIDEM (AMBIEN) 10 MG TABLET    TAKE 1 TABLET BY MOUTH NIGHTLY AS NEEDED FOR SLEEP FOR UP TO 30 DAYS. ALLERGIES     Pollen extract    FAMILY HISTORY       Family History   Problem Relation Age of Onset    Heart Disease Mother     COPD Mother     Heart Disease Father     High Blood Pressure Father     High Blood Pressure Sister     High Blood Pressure Brother     High Blood Pressure Sister           SOCIAL HISTORY       Social History     Socioeconomic History    Marital status:       Spouse name: Not on file    Number of children: Not on file    Years of education: Not on file    Highest education level: Not on file   Occupational History    Not on file   Tobacco Use    Smoking status: Former Smoker     Packs/day: 0.50     Years: 20.00     Pack years: 10.00     Quit date: 1990     Years since quittin.7    Smokeless tobacco: Never Used   Substance and Sexual Activity    Alcohol use: No    Drug use: No    Sexual activity: Not on file   Other Topics Concern    Not on file Social History Narrative    Not on file     Social Determinants of Health     Financial Resource Strain:     Difficulty of Paying Living Expenses:    Food Insecurity:     Worried About Running Out of Food in the Last Year:     [address] in the Last Year:    Transportation Needs:     Lack of Transportation (Medical):  Lack of Transportation (Non-Medical):    Physical Activity:     Days of Exercise per Week:     Minutes of Exercise per Session:    Stress:     Feeling of Stress :    Social Connections:     Frequency of Communication with Friends and Family:     Frequency of Social Gatherings with Friends and Family:     Attends [employer] Services:     Active Member of Clubs or Organizations:     Attends Club or Organization Meetings:     Marital Status:    Intimate Partner Violence:     Fear of Current or Ex-Partner:     Emotionally Abused:     Physically Abused:     Sexually Abused:        SCREENINGS             PHYSICAL EXAM    (up to 7 for level 4, 8 or more for level 5)     ED Triage Vitals [09/20/21 1707]   BP Temp Temp Source Pulse Resp SpO2 Height Weight   137/87 98.2 °F (36.8 °C) Oral 68 18 97 % -- --       Physical Exam  Vitals and nursing note reviewed. Constitutional:       General: She is not in acute distress. Appearance: Normal appearance. She is well-developed. She is not diaphoretic. HENT:      Head: Normocephalic and atraumatic. No Estrella's sign, contusion, right periorbital erythema, left periorbital erythema or laceration. Right Ear: External ear normal.      Left Ear: External ear normal.      Nose: No nasal deformity, laceration, mucosal edema or rhinorrhea. Mouth/Throat:      Mouth: No oral lesions. Eyes:      Conjunctiva/sclera: Conjunctivae normal.      Pupils: Pupils are equal, round, and reactive to light. Neck:      Trachea: No tracheal deviation. Cardiovascular:      Rate and Rhythm: Normal rate and regular rhythm.       Pulses: Radial pulses are 2+ on the right side and 2+ on the left side. Dorsalis pedis pulses are 2+ on the right side and 2+ on the left side. Pulmonary:      Effort: No accessory muscle usage or respiratory distress. Breath sounds: Normal breath sounds. No rhonchi or rales. Comments: Breath sounds present on the right but decreased compared to left side  Chest:      Chest wall: Tenderness present. Abdominal:      General: There is no distension. Palpations: Abdomen is not rigid. Tenderness: There is no abdominal tenderness. There is no guarding. Musculoskeletal:      Right shoulder: Normal.      Left shoulder: Normal.      Cervical back: No deformity, rigidity, tenderness or bony tenderness. Thoracic back: Normal. No deformity, tenderness or bony tenderness. Lumbar back: Normal. No deformity, tenderness or bony tenderness. Right hip: Normal.      Left hip: Normal.      Right knee: Normal.      Left knee: Normal.   Skin:     Findings: No laceration. Neurological:      Mental Status: She is alert and oriented to person, place, and time. GCS: GCS eye subscore is 4. GCS verbal subscore is 5. GCS motor subscore is 6. Cranial Nerves: No cranial nerve deficit. Sensory: No sensory deficit. Psychiatric:         Speech: Speech normal.         DIAGNOSTIC RESULTS     EKG: All EKG's are interpreted by the Emergency Department Physician who either signs or Co-signs this chart in the absence of a cardiologist.        RADIOLOGY:   Non-plain film images such as CT, Ultrasound and MRI are read by the radiologist. Plainradiographic images are visualized and preliminarily interpreted by the emergency physician with the below findings:      Interpretation per the Radiologist below, if available at the time of this note:    CT CHEST W CONTRAST   Final Result   1. Acute mildly displaced fractures of the right fifth through 10th   ribs anterolaterally.    2. Acute mildly displaced fractures of the right seventh and eighth   ribs posteriorly. 3. Small right pneumothorax and hemothorax. 3. Extensive right chest wall and right neck emphysema. Signed by Dr Genaro Gould            ED BEDSIDE ULTRASOUND:   Performed by ED Physician - none    LABS:  Labs Reviewed   CBC WITH AUTO DIFFERENTIAL - Abnormal; Notable for the following components:       Result Value    MCHC 31.0 (*)     RDW 14.6 (*)     MPV 9.3 (*)     Neutrophils % 73.7 (*)     Lymphocytes % 13.6 (*)     All other components within normal limits   COMPREHENSIVE METABOLIC PANEL - Abnormal; Notable for the following components:    Sodium 135 (*)     All other components within normal limits   COVID-19, RAPID   MAGNESIUM       All other labs were within normal range or not returned as of this dictation.     Medications   tiZANidine (ZANAFLEX) tablet 2 mg (has no administration in time range)   iopamidol (ISOVUE-370) 76 % injection 90 mL (90 mLs IntraVENous Given 9/20/21 1920)   HYDROcodone-acetaminophen (Katherine Orf) 7.5-325 MG per tablet 1 tablet (1 tablet Oral Given 9/20/21 2018)       EMERGENCY DEPARTMENT COURSE and DIFFERENTIALDIAGNOSIS/MDM:   Vitals:    Vitals:    09/20/21 1707   BP: 137/87   Pulse: 68   Resp: 18   Temp: 98.2 °F (36.8 °C)   TempSrc: Oral   SpO2: 97%       MDM  Number of Diagnoses or Management Options  Closed fracture of multiple ribs of right side, initial encounter: new and does not require workup  Traumatic pneumothorax, initial encounter: new and requires workup     Amount and/or Complexity of Data Reviewed  Clinical lab tests: ordered and reviewed  Tests in the radiology section of CPT®: ordered and reviewed  Tests in the medicine section of CPT®: ordered and reviewed  Obtain history from someone other than the patient: yes  Review and summarize past medical records: yes  Discuss the patient with other providers: yes  Independent visualization of images, tracings, or specimens: yes    Risk of Complications, Morbidity, and/or Mortality  Presenting problems: high  Diagnostic procedures: moderate  Management options: high    Critical Care  Total time providing critical care: 30-74 minutes    Patient Progress  Patient progress: stable      ED Course as of Sep 20 2152   Mon Sep 20, 2021   1702 EKG shows sinus rhythm with a rate of 73. No findings of acute ischemia or infarction. Normal intervals. [LINDSEY]   1841 Discussed case with cardiothoracic surgery. At this point given that patient's injury occurred several days ago and she remained stable no hypoxia on room air, plan for admission to hospitalist service for monitoring and CT surgery consult. [LINDSEY]      ED Course User Index  [LINDSEY] Ky Natarajan MD     Patient is hemodynamically stable here. No hypoxia at rest.    Based on the evaluation and work-up here patient is felt to require further monitoring, work-up, or treatment that is available in the emergency department. Case was discussed with hospitalist who agrees for observation or admission for further management. Treatment and stabilization as necessary were provided in the emergency department prior to transfer of care to the medicine service. CONSULTS:  IP CONSULT TO CARDIOTHORACIC SURGERY    PROCEDURES:  Unless otherwise notedbelow, none     Procedures  CRITICAL CARE TIME   Total Critical Care time was 30 minutes, excluding separately reportable procedures. There was a high probability of clinically significant/life threatening deterioration in the patient's condition which required my urgent intervention. FINAL IMPRESSION     1. Traumatic pneumothorax, initial encounter    2. Closed fracture of multiple ribs of right side, initial encounter          DISPOSITION/PLAN   DISPOSITION Admitted 09/20/2021 06:55:58 PM      PATIENT REFERRED TO:  No follow-up provider specified.     DISCHARGE MEDICATIONS:  New Prescriptions    No medications on file          (Please note that portions of this note were completed with a voice recognition program.  Efforts were made to edit the dictations butoccasionally words are mis-transcribed.)    Javier Moya MD (electronically signed)  AttendingEmerBaptist Health Medical Centercy Physician          Javier Moya., MD  09/20/21 3456

## 2021-09-20 NOTE — ED NOTES
Bed: 05  Expected date:   Expected time:   Means of arrival:   Comments:  Argenis Farley RN  09/20/21 8102

## 2021-09-20 NOTE — TELEPHONE ENCOUNTER
----- Message from [facility] sent at 9/20/2021 11:55 AM CDT -----  Subject: Referral Request    QUESTIONS   Reason for referral request? Patient fell when getting out of bed last   night and she hurt her ribs, she is requesting an order for an Xray be   sent over. Best contact number is [phone] to call back. Has the physician seen you for this condition before? No   Preferred Specialist (if applicable)? Do you already have an appointment scheduled? No  Additional Information for Provider? Patient fell when getting out of bed   last night and she hurt her ribs, she is requesting an order for an Xray   be sent over. Best contact number is [phone] to call back. ---------------------------------------------------------------------------  --------------  Nilesh Hinders INFO  What is the best way for the office to contact you? OK to leave message on   voicemail  Preferred Call Back Phone Number?  [phone]

## 2021-09-21 ENCOUNTER — APPOINTMENT (OUTPATIENT)
Dept: GENERAL RADIOLOGY | Age: 81
End: 2021-09-21
Payer: MEDICARE

## 2021-09-21 ENCOUNTER — TELEPHONE (OUTPATIENT)
Dept: PRIMARY CARE CLINIC | Age: 81
End: 2021-09-21

## 2021-09-21 VITALS
SYSTOLIC BLOOD PRESSURE: 120 MMHG | TEMPERATURE: 96.6 F | OXYGEN SATURATION: 91 % | HEART RATE: 73 BPM | RESPIRATION RATE: 18 BRPM | DIASTOLIC BLOOD PRESSURE: 62 MMHG

## 2021-09-21 PROBLEM — M50.10 CERVICAL DISC DISORDER WITH RADICULOPATHY: Status: ACTIVE | Noted: 2021-09-21

## 2021-09-21 PROBLEM — S82.90XD AFTERCARE FOR HEALING TRAUMATIC FRACTURE OF LOWER LEG: Status: ACTIVE | Noted: 2021-09-21

## 2021-09-21 PROBLEM — M51.379 DEGENERATION OF LUMBAR OR LUMBOSACRAL INTERVERTEBRAL DISC: Status: ACTIVE | Noted: 2021-09-21

## 2021-09-21 PROBLEM — S82.54XK: Status: ACTIVE | Noted: 2021-09-21

## 2021-09-21 PROBLEM — M19.012 PRIMARY OSTEOARTHRITIS OF LEFT SHOULDER: Status: ACTIVE | Noted: 2021-09-21

## 2021-09-21 PROBLEM — R07.89 CHEST WALL PAIN: Status: ACTIVE | Noted: 2021-09-21

## 2021-09-21 PROBLEM — M47.817 SPONDYLOSIS OF LUMBOSACRAL REGION WITHOUT MYELOPATHY OR RADICULOPATHY: Status: ACTIVE | Noted: 2021-09-21

## 2021-09-21 PROBLEM — M46.1 SACROILIITIS (HCC): Status: ACTIVE | Noted: 2021-09-21

## 2021-09-21 PROBLEM — Z79.891 LONG TERM (CURRENT) USE OF OPIATE ANALGESIC: Status: ACTIVE | Noted: 2021-09-21

## 2021-09-21 PROBLEM — M51.37 DEGENERATION OF LUMBAR OR LUMBOSACRAL INTERVERTEBRAL DISC: Status: ACTIVE | Noted: 2021-09-21

## 2021-09-21 PROBLEM — Z96.659 KNEE JOINT REPLACED BY OTHER MEANS: Status: ACTIVE | Noted: 2021-09-21

## 2021-09-21 PROBLEM — M25.569 PAIN IN JOINT, LOWER LEG: Status: ACTIVE | Noted: 2021-09-21

## 2021-09-21 LAB
ALBUMIN SERPL-MCNC: 3.7 G/DL (ref 3.5–5.2)
ALP BLD-CCNC: 134 U/L (ref 35–104)
ALT SERPL-CCNC: 53 U/L (ref 5–33)
ANION GAP SERPL CALCULATED.3IONS-SCNC: 13 MMOL/L (ref 7–19)
AST SERPL-CCNC: 87 U/L (ref 5–32)
BASOPHILS ABSOLUTE: 0 K/UL (ref 0–0.2)
BASOPHILS RELATIVE PERCENT: 0.4 % (ref 0–1)
BILIRUB SERPL-MCNC: 0.7 MG/DL (ref 0.2–1.2)
BUN BLDV-MCNC: 18 MG/DL (ref 8–23)
CALCIUM SERPL-MCNC: 9.1 MG/DL (ref 8.8–10.2)
CHLORIDE BLD-SCNC: 101 MMOL/L (ref 98–111)
CO2: 24 MMOL/L (ref 22–29)
CREAT SERPL-MCNC: 0.7 MG/DL (ref 0.5–0.9)
EKG P AXIS: 76 DEGREES
EKG P-R INTERVAL: 154 MS
EKG Q-T INTERVAL: 376 MS
EKG QRS DURATION: 84 MS
EKG QTC CALCULATION (BAZETT): 398 MS
EKG T AXIS: 4 DEGREES
EOSINOPHILS ABSOLUTE: 0.2 K/UL (ref 0–0.6)
EOSINOPHILS RELATIVE PERCENT: 2.5 % (ref 0–5)
GFR AFRICAN AMERICAN: >59
GFR NON-AFRICAN AMERICAN: >60
GLUCOSE BLD-MCNC: 117 MG/DL (ref 74–109)
HCT VFR BLD CALC: 40.9 % (ref 37–47)
HEMOGLOBIN: 13 G/DL (ref 12–16)
IMMATURE GRANULOCYTES #: 0 K/UL
INR BLD: 1.14 (ref 0.88–1.18)
LYMPHOCYTES ABSOLUTE: 1.1 K/UL (ref 1.1–4.5)
LYMPHOCYTES RELATIVE PERCENT: 12.7 % (ref 20–40)
MCH RBC QN AUTO: 29.1 PG (ref 27–31)
MCHC RBC AUTO-ENTMCNC: 31.8 G/DL (ref 33–37)
MCV RBC AUTO: 91.7 FL (ref 81–99)
MONOCYTES ABSOLUTE: 0.8 K/UL (ref 0–0.9)
MONOCYTES RELATIVE PERCENT: 9.5 % (ref 0–10)
NEUTROPHILS ABSOLUTE: 6.3 K/UL (ref 1.5–7.5)
NEUTROPHILS RELATIVE PERCENT: 74.5 % (ref 50–65)
PDW BLD-RTO: 14.5 % (ref 11.5–14.5)
PLATELET # BLD: 307 K/UL (ref 130–400)
PMV BLD AUTO: 9.4 FL (ref 9.4–12.3)
POTASSIUM SERPL-SCNC: 3.7 MMOL/L (ref 3.5–5)
PROTHROMBIN TIME: 14.8 SEC (ref 12–14.6)
RBC # BLD: 4.46 M/UL (ref 4.2–5.4)
SODIUM BLD-SCNC: 138 MMOL/L (ref 136–145)
TOTAL PROTEIN: 5.9 G/DL (ref 6.6–8.7)
WBC # BLD: 8.4 K/UL (ref 4.8–10.8)

## 2021-09-21 PROCEDURE — 36415 COLL VENOUS BLD VENIPUNCTURE: CPT

## 2021-09-21 PROCEDURE — 6370000000 HC RX 637 (ALT 250 FOR IP): Performed by: HOSPITALIST

## 2021-09-21 PROCEDURE — 85025 COMPLETE CBC W/AUTO DIFF WBC: CPT

## 2021-09-21 PROCEDURE — G0378 HOSPITAL OBSERVATION PER HR: HCPCS

## 2021-09-21 PROCEDURE — 80053 COMPREHEN METABOLIC PANEL: CPT

## 2021-09-21 PROCEDURE — 6370000000 HC RX 637 (ALT 250 FOR IP)

## 2021-09-21 PROCEDURE — 85610 PROTHROMBIN TIME: CPT

## 2021-09-21 PROCEDURE — 96375 TX/PRO/DX INJ NEW DRUG ADDON: CPT

## 2021-09-21 PROCEDURE — 99999 PR OFFICE/OUTPT VISIT,PROCEDURE ONLY: CPT | Performed by: THORACIC SURGERY (CARDIOTHORACIC VASCULAR SURGERY)

## 2021-09-21 PROCEDURE — 71046 X-RAY EXAM CHEST 2 VIEWS: CPT

## 2021-09-21 PROCEDURE — 6360000002 HC RX W HCPCS: Performed by: NURSE PRACTITIONER

## 2021-09-21 PROCEDURE — 2580000003 HC RX 258: Performed by: HOSPITALIST

## 2021-09-21 PROCEDURE — 6370000000 HC RX 637 (ALT 250 FOR IP): Performed by: NURSE PRACTITIONER

## 2021-09-21 PROCEDURE — 93010 ELECTROCARDIOGRAM REPORT: CPT | Performed by: INTERNAL MEDICINE

## 2021-09-21 PROCEDURE — 96374 THER/PROPH/DIAG INJ IV PUSH: CPT

## 2021-09-21 RX ORDER — ZOLPIDEM TARTRATE 5 MG/1
10 TABLET ORAL NIGHTLY PRN
Status: DISCONTINUED | OUTPATIENT
Start: 2021-09-21 | End: 2021-09-21 | Stop reason: HOSPADM

## 2021-09-21 RX ORDER — MORPHINE SULFATE 2 MG/ML
2 INJECTION, SOLUTION INTRAMUSCULAR; INTRAVENOUS EVERY 4 HOURS PRN
Status: DISCONTINUED | OUTPATIENT
Start: 2021-09-21 | End: 2021-09-21 | Stop reason: HOSPADM

## 2021-09-21 RX ORDER — TIZANIDINE 2 MG/1
2 TABLET ORAL EVERY 6 HOURS PRN
Qty: 10 TABLET | Refills: 0 | Status: SHIPPED | OUTPATIENT
Start: 2021-09-21 | End: 2021-09-26

## 2021-09-21 RX ORDER — MELOXICAM 15 MG/1
15 TABLET ORAL DAILY
Qty: 90 TABLET | Refills: 2 | Status: CANCELLED | OUTPATIENT
Start: 2021-09-21

## 2021-09-21 RX ORDER — KETOROLAC TROMETHAMINE 10 MG/1
10 TABLET, FILM COATED ORAL 3 TIMES DAILY
Qty: 20 TABLET | Refills: 0 | Status: SHIPPED | OUTPATIENT
Start: 2021-09-21 | End: 2022-04-27

## 2021-09-21 RX ORDER — KETOROLAC TROMETHAMINE 30 MG/ML
15 INJECTION, SOLUTION INTRAMUSCULAR; INTRAVENOUS EVERY 8 HOURS
Status: DISCONTINUED | OUTPATIENT
Start: 2021-09-21 | End: 2021-09-21 | Stop reason: HOSPADM

## 2021-09-21 RX ADMIN — ATORVASTATIN CALCIUM 40 MG: 40 TABLET, FILM COATED ORAL at 11:04

## 2021-09-21 RX ADMIN — FLUTICASONE PROPIONATE 2 SPRAY: 50 SPRAY, METERED NASAL at 10:59

## 2021-09-21 RX ADMIN — GABAPENTIN 200 MG: 100 CAPSULE ORAL at 00:24

## 2021-09-21 RX ADMIN — HYDROCODONE BITARTRATE AND ACETAMINOPHEN 1 TABLET: 7.5; 325 TABLET ORAL at 06:18

## 2021-09-21 RX ADMIN — SODIUM CHLORIDE, PRESERVATIVE FREE 10 ML: 5 INJECTION INTRAVENOUS at 00:24

## 2021-09-21 RX ADMIN — SODIUM CHLORIDE, PRESERVATIVE FREE 10 ML: 5 INJECTION INTRAVENOUS at 10:58

## 2021-09-21 RX ADMIN — CALCIUM 500 MG: 500 TABLET ORAL at 11:06

## 2021-09-21 RX ADMIN — TROSPIUM CHLORIDE 20 MG: 20 TABLET, FILM COATED ORAL at 06:15

## 2021-09-21 RX ADMIN — KETOROLAC TROMETHAMINE 15 MG: 30 INJECTION, SOLUTION INTRAMUSCULAR; INTRAVENOUS at 11:07

## 2021-09-21 RX ADMIN — VERAPAMIL HYDROCHLORIDE 240 MG: 240 TABLET, FILM COATED, EXTENDED RELEASE ORAL at 11:01

## 2021-09-21 RX ADMIN — HYDROCHLOROTHIAZIDE 50 MG: 25 TABLET ORAL at 11:13

## 2021-09-21 RX ADMIN — GABAPENTIN 200 MG: 100 CAPSULE ORAL at 11:00

## 2021-09-21 RX ADMIN — TIZANIDINE 2 MG: 4 TABLET ORAL at 00:24

## 2021-09-21 RX ADMIN — Medication 2000 UNITS: at 11:03

## 2021-09-21 RX ADMIN — SODIUM CHLORIDE: 9 INJECTION, SOLUTION INTRAVENOUS at 00:25

## 2021-09-21 RX ADMIN — AMLODIPINE BESYLATE 10 MG: 10 TABLET ORAL at 11:06

## 2021-09-21 RX ADMIN — POTASSIUM CHLORIDE 10 MEQ: 750 TABLET, EXTENDED RELEASE ORAL at 11:05

## 2021-09-21 RX ADMIN — ZINC SULFATE 220 MG (50 MG) CAPSULE 50 MG: CAPSULE at 11:05

## 2021-09-21 RX ADMIN — DULOXETINE HYDROCHLORIDE 40 MG: 20 CAPSULE, DELAYED RELEASE ORAL at 11:04

## 2021-09-21 RX ADMIN — HYDROCHLOROTHIAZIDE 25 MG: 25 TABLET ORAL at 11:03

## 2021-09-21 RX ADMIN — LOSARTAN POTASSIUM 100 MG: 100 TABLET, FILM COATED ORAL at 11:01

## 2021-09-21 RX ADMIN — TROSPIUM CHLORIDE 20 MG: 20 TABLET, FILM COATED ORAL at 16:53

## 2021-09-21 RX ADMIN — MORPHINE SULFATE 2 MG: 2 INJECTION, SOLUTION INTRAMUSCULAR; INTRAVENOUS at 01:09

## 2021-09-21 RX ADMIN — CITALOPRAM HYDROBROMIDE 40 MG: 20 TABLET ORAL at 11:05

## 2021-09-21 RX ADMIN — THERA TABS 1 TABLET: TAB at 11:06

## 2021-09-21 ASSESSMENT — ENCOUNTER SYMPTOMS
CONSTIPATION: 0
ABDOMINAL DISTENTION: 0
BLOOD IN STOOL: 0
COUGH: 0
WHEEZING: 0
TROUBLE SWALLOWING: 0
SORE THROAT: 0
SHORTNESS OF BREATH: 1
DIARRHEA: 0
BACK PAIN: 1
ABDOMINAL PAIN: 0
NAUSEA: 0
SINUS PAIN: 0
VOMITING: 0

## 2021-09-21 ASSESSMENT — PAIN DESCRIPTION - PAIN TYPE
TYPE: ACUTE PAIN
TYPE: ACUTE PAIN

## 2021-09-21 ASSESSMENT — PAIN SCALES - GENERAL
PAINLEVEL_OUTOF10: 9
PAINLEVEL_OUTOF10: 10
PAINLEVEL_OUTOF10: 10
PAINLEVEL_OUTOF10: 0
PAINLEVEL_OUTOF10: 7

## 2021-09-21 ASSESSMENT — PAIN DESCRIPTION - DESCRIPTORS
DESCRIPTORS: ACHING
DESCRIPTORS: ACHING

## 2021-09-21 ASSESSMENT — PAIN - FUNCTIONAL ASSESSMENT: PAIN_FUNCTIONAL_ASSESSMENT: PREVENTS OR INTERFERES SOME ACTIVE ACTIVITIES AND ADLS

## 2021-09-21 ASSESSMENT — PAIN DESCRIPTION - FREQUENCY
FREQUENCY: INTERMITTENT
FREQUENCY: INTERMITTENT

## 2021-09-21 ASSESSMENT — PAIN DESCRIPTION - ORIENTATION
ORIENTATION: MID;LOWER
ORIENTATION: RIGHT;MID

## 2021-09-21 ASSESSMENT — PAIN DESCRIPTION - LOCATION
LOCATION: BACK;RIB CAGE
LOCATION: BACK;RIB CAGE

## 2021-09-21 ASSESSMENT — PAIN DESCRIPTION - PROGRESSION: CLINICAL_PROGRESSION: GRADUALLY IMPROVING

## 2021-09-21 ASSESSMENT — PAIN DESCRIPTION - ONSET: ONSET: ON-GOING

## 2021-09-21 NOTE — PROGRESS NOTES
This nurse went over discharge instructions with pt and daughter at bedside. All questions sought and answered. Pt states she wishes to go home as she is normally very active and independent. Pt understands she has had a couple falls at home and states it is related to getting up in the middle of the night to use the restroom when she is sleepy and groggy. Pt and daughter wish to have a DME order for BSD commode for home and state they have all other equipment needed. Order faxed to [facility] and will deliver to pt room.   Electronically signed by João Penn RN on 9/21/2021 at 4:45 PM

## 2021-09-21 NOTE — CONSULTS
Consultation History & Physical    Date of Admission:  9/20/2021  3:56 PM  Date of Consultation:  9/21/2021    Surgeon: Dr. Berna Cadena       PCP:  Mariama Pichardo MD     Referring Physician: Dr. Aakash German     Reason for Consultation:  Traumatic Right Pneumothorax    History of Present Illness:   Ms. Samanta Monzon is an 80 y.o. female who fell getting out of her bed, hitting her bed post on right side 3 days ago. She called her PCP asking for a CXR as she was having intense right-sided chest pain and shortness of breath. CXR demonstrated right-sided rib fractures with small right apical pneumothorax. She was instructed to go to the ER for further evaluation. Work-up in the ER included CT chest with contrast demonstrated acute mildly displaced rib fractures, 5 - 10 anteriorly and 7 - 8 posteriorly with small right pneumothorax. Labs were unremarkable. Patient admitted to the hospitalist service with CT surgery consultation.       Past Medical History:    Past Medical History:   Diagnosis Date    Arthritis     CAD (coronary artery disease)     Colon polyps     Constipation     Diverticulosis     Gall stones     Gallstones     Hayfever     Heart palpitations     Hypertension     Insomnia     Nasal drainage     Osteoarthritis     Osteopenia 2013    T score -1.7    Panic attacks     Polyp of colon     Rectal carcinoid tumor Dec 2014    Dr. Shelby Villanueva found on scope    Seasonal allergies     Shortness of breath on exertion     Sneezing     Urinary frequency    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    Whooping cough     9 mos old       Past Surgical History:    Past Surgical History:   Procedure Laterality Date    APPENDECTOMY      BREAST SURGERY      Biopsy x2    CATARACT REMOVAL Bilateral 4/2015,5/2015    CHOLECYSTECTOMY      COLONOSCOPY  10- (LIPITOR) 40 MG tablet Take 1 tablet by mouth daily 8/5/20   Tan Callahan MD   nystatin (MYCOSTATIN) 822172 UNIT/GM powder Apply 3 times daily. 2/10/20   CRISTIANE Mercado CNP   gabapentin (NEURONTIN) 100 MG capsule Take 200 mg by mouth 2 times daily. Historical Provider, MD   polyethylene glycol (GLYCOLAX) powder DISSOLVE 1 CAPFUL (17 GRAMS) IN 8 OUNCES WATER TWICE A DAY 1/2/16   CRISTIANE Loomis   HYDROcodone-acetaminophen (Pedro Comes) 7.5-325 MG per tablet Take one up to three times a day as needed for arthritis flares 10/28/15   Donna Donaldson MD   hydrOXYzine (ATARAX) 50 MG tablet TAKE 1 TABLET BY MOUTH NIGHTLY AS NEEDED (SLEEP). 3/9/15   CRISTIANE Quintana - CNP   Pediatric Multivitamins-Fl (MULT-VITAMIN/FLUORIDE PO) Take  by mouth. Historical Provider, MD   calcium carbonate (OSCAL) 500 MG TABS tablet Take 500 mg by mouth daily. Historical Provider, MD        Facility Administered Medications:    amLODIPine  10 mg Oral Daily    atorvastatin  40 mg Oral Daily    calcium elemental  500 mg Oral Daily    Vitamin D  2,000 Units Oral Daily    citalopram  40 mg Oral Daily    DULoxetine  40 mg Oral Daily    fluticasone  2 spray Nasal Daily    gabapentin  200 mg Oral BID    hydroCHLOROthiazide  50 mg Oral Daily    potassium chloride  10 mEq Oral Daily with breakfast    [Held by provider] meloxicam  15 mg Oral Daily    trospium  20 mg Oral BID AC    multivitamin  1 tablet Oral Daily    verapamil  240 mg Oral BID    zinc sulfate  50 mg Oral Daily    sodium chloride flush  5-40 mL IntraVENous 2 times per day    polyethylene glycol  17 g Oral Daily    losartan  100 mg Oral Daily    And    hydroCHLOROthiazide  25 mg Oral Daily       Allergies:  Pollen extract     Social History:       Social History     Socioeconomic History    Marital status:       Spouse name: Not on file    Number of children: Not on file    Years of education: Not on file    Highest education level: Not on file   Occupational History    Not on file   Tobacco Use    Smoking status: Former Smoker     Packs/day: 0.50     Years: 20.00     Pack years: 10.00     Quit date: 1990     Years since quittin.7    Smokeless tobacco: Never Used   Substance and Sexual Activity    Alcohol use: No    Drug use: No    Sexual activity: Not on file   Other Topics Concern    Not on file   Social History Narrative    Not on file     Social Determinants of Health     Financial Resource Strain:     Difficulty of Paying Living Expenses:    Food Insecurity:     Worried About Running Out of Food in the Last Year:     [address] in the Last Year:    Transportation Needs:     Lack of Transportation (Medical):  Lack of Transportation (Non-Medical):    Physical Activity:     Days of Exercise per Week:     Minutes of Exercise per Session:    Stress:     Feeling of Stress :    Social Connections:     Frequency of Communication with Friends and Family:     Frequency of Social Gatherings with Friends and Family:     Attends Spiritism Services:     Active Member of Clubs or Organizations:     Attends Club or Organization Meetings:     Marital Status:    Intimate Partner Violence:     Fear of Current or Ex-Partner:     Emotionally Abused:     Physically Abused:     Sexually Abused:        Family History:     Family History   Problem Relation Age of Onset    Heart Disease Mother     COPD Mother     Heart Disease Father     High Blood Pressure Father     High Blood Pressure Sister     High Blood Pressure Brother     High Blood Pressure Sister          Review of Systems:  Constitutional:  No fever, chills, night sweats, or weight loss. HEENT: No vision loss, double vision, blurred vision, or tearing. No hearing loss, tinnitus, or infection. No nasal discharge or epistaxis. No dysphagia. Respiratory:  No cough, or sputum production. No history of TB exposure.  Hard to take a deep breath 2' to right-sided chest pain. Cardiovascular: No hypertension, hypotension, anginal type chest pain, dizziness, or syncopal spells. No history of palpitations. Peripheral Vascular:  No history of claudication. Gastrointestinal:  No nausea, vomiting, diarrhea, or constipation. No reflux or gastroesophageal reflux disease. Genitourinary:  No dysuria, urgency, frequency, or history of urinary tract infections. No history of nephrolithiasis or renal insufficiency. Neurological:  No history of cerebrovascular accident, transient ischemic attack, or amaurosis fugax. No history of seizure disorder. Complaining of headache. Denied hitting head, but stated she fell a couple of weeks ago and hit the back of her head. Integumentary: No rash, history of nonhealing wounds or skin cancer removal.   Psychiatric:  No anxiety or depression. Endocrine: No polyuria, polydipsia, or significant weight gain. No heat or cold intolerance. Musculoskeletal: No limit to range of motion of joints or swelling of limbs. Right-sided pleuritic chest pain. Hematologic: No history of DVT, PE, or anemia. Physical Examination:    /62   Pulse 73   Temp 96.3 °F (35.7 °C) (Temporal)   Resp 16   SpO2 90%       General:  Alert & Oriented x 3 in no apparent distress. HEENT:  Normocephalic. Atraumatic. MAXIMO. Neck: No JVD, no lymphadenopathy. Supple. Trachea midline. Thyroid normal.   Respiratory: Effort is unlabored. Clear, diminished in bases bilaterally without crackles, wheezes or rubs. No palpable crepitus. Cardiovascular: Normal HT with RRR. No murmurs, gallops or rubs. No carotid bruits. No edema or varicosities. Pulses: Normal  GI: Abdomen soft, nondistended, no organomegaly. Active bowel sounds x 4.   Musculoskeletal: Strength and tone normal  Extremities: PPP, no edema  Skin: Warm & Dry  Neuro/psychiatric: Grossly intact    MEDICAL DECISION MAKING/TESTING    CXR (09/21/2021):   Small right apical pneumothorax. Right rib fractures with subcutaneous emphysema. CT Chest with Contrast:   1. Acute mildly displaced fractures of the right fifth through 10th ribs anterolaterally. 2. Acute mildly displaced fractures of the right seventh and eighth ribs posteriorly. 3. Small right pneumothorax and hemothorax. 4. Extensive right chest wall and right neck emphysema. Labs:   CBC:   Recent Labs     09/20/21 1817 09/21/21 0412   WBC 9.6 8.4   HGB 14.0 13.0   HCT 45.2 40.9   MCV 94.2 91.7    307     BMP:   Recent Labs     09/20/21 1817 09/21/21 0412   * 138   K 3.9 3.7    101   CO2 25 24   BUN 21 18   CREATININE 0.7 0.7     PT/INR:   Recent Labs     09/21/21 0412   PROTIME 14.8*   INR 1.14     Liver Profile:  Lab Results   Component Value Date    AST 87 09/21/2021    ALT 53 09/21/2021    BILITOT 0.7 09/21/2021    ALKPHOS 134 09/21/2021           Diagnosis:      1. Traumatic Pneumothorax, Right Side 2' to Fall  2. Multiple Right-Sided Rib Fractures 2' to Fall  3. Essential HTN  4 Mixed Hyperlipidemia    Plan:     1. Add Toradol for pain  2. No CT surgical needs at this time. 3. Stable from CT surgery standpoint. Dr. Tylor Valadez to review records/ images. He will discuss recommendations as well as R/B/A with patient and family. Further recommendations per Dr. Tylor Valadez. Markos Morales, APRN  9/21/2021  7:48 AM  I have seen and examined the patient myself the above was discussed with the nurse practitioner. This pleasant 35-year-old lady slipped and fell 3 nights ago striking the right side of her chest wall on a bedpost.  She had immediate severe pain and shortness of breath but rested overnight before coming to medical attention the next day. Chest CT scan taken in the ER last evening demonstrates nondisplaced multiple rib fractures on the right side with a very tiny pneumothorax and a small amount of subcutaneous air. She otherwise has normal stable vital signs.   A chest x-ray taken this morning continues to show only a small tiny rim of air near the apex otherwise right lung is well-expanded there is no underlying pulmonary contusion and no significant hemothorax. . At this time her chest trauma subacute as this event occurred 3 days ago. I recommend continued analgesics she will not require intervention as far as the pneumothorax is concerned. She can be discharged home when she is stable and fairly comfortable. She can follow-up with her primary care physician. I do not need to see her in the office.   I told her that it will take approximately 3 months for the ribs to heal.  Leonid Resendiz MD 9/21/2021  1000

## 2021-09-21 NOTE — DISCHARGE SUMMARY
Discharge Summary    NAME: Bob Ho  :  1940  MRN:  [id number]    Admit date:  2021  Discharge date:      Admitting Physician:  Kory Ralph MD    Advance Directive: Full Code    Consults: CT surgery    Primary Care Physician:  Adoflo Carr MD    Discharge Diagnoses:  Principal Problem:    Pneumothorax  Active Problems:    Arthritis    Hyperlipidemia    Essential hypertension    Shortness of breath on exertion    Chest wall pain  Resolved Problems:    * No resolved hospital problems. *      Significant Diagnostic Studies:   XR CHEST (2 VW)    Result Date: 2021  EXAMINATION: XR CHEST (2 VW) 2021 8:33 AM HISTORY: Pneumothorax COMPARISON: Pneumothorax COMPARISON: CT chest with contrast 2021 FINDINGS: A right apical pneumothorax is present with approximately 1 cm airgap at the lung apex. There is diffuse coarsening of the interstitial markings. There is trace right pleural fluid. The left lung remains clear. There is subcutaneous emphysema of the right chest wall. Right-sided rib fractures are present. Small right apical pneumothorax. Right rib fractures with subcutaneous emphysema. Signed by Dr Katy Patricia (MIN 3 VIEWS)    Result Date: 2021  Examination. XR RIBS RIGHT INCLUDE CHEST (MIN 3 VIEWS) 2021 1:39 PM History: The patient fell couple days ago and hit the back post. The frontal projection of the chest and frontal and oblique views of the right ribs are obtained. The comparison is made with the previous study dated 3/27/2014. The lungs are poorly expanded with areas of discoid atelectasis. There is a small, 10% pneumothorax in the right upper chest laterally. There is some blunting of the right lateral costophrenic sulcus which may represent pleural effusion or subpleural hematoma. The heart size in the normal range. Atheromatous changes thoracic aorta are noted.  Frontal and oblique views of the right ribs show evidence of fractures involving the sixth, seventh and eighth ribs laterally and anterolaterally. The lower ribs are suboptimally evaluated due to extensive superimposed soft tissue emphysema. There is moderate diffuse osteopenia. Multiple right-sided displaced rib fractures. A small right upper chest pneumothorax. No evidence to suggest a tension pneumothorax. Soft tissue air along the right lower lateral chest wall. (The integrity of the liver may be evaluated with CT scan of the abdomen). The above report was called to the care provider, Dr. Danilo Galeas. Signed by Dr Jennifer Lucas Date: 9/20/2021  CT CHEST W CONTRAST 9/20/2021 7:19 PM HISTORY: 9/20/2021 COMPARISON: None Radiation dose equals DLP 1395 mGy-cm. Automated exposure control dose reduction technique was implemented. TECHNIQUE: Serial helical tomographic images of the chest were acquired following the infusion of Isovue contrast intravenously. Bone and soft tissue algorithms were provided. FINDINGS: Neck base: The imaged portion of the neck and thyroid gland is unremarkable. Lungs: There is a small right pneumothorax, maximum air gap of 1 cm. A small right pleural effusion, which measures up to 22 HU, consistent with hemothorax. There is no evidence of ongoing extravasation. . There is an adjacent right lower lobe atelectasis. . The trachea and bronchial tree are patent. Heart: Normal in size and appearance. There is no pericardial effusion. Vasculature: There is aortic atherosclerosis without stenosis or aneurysm. Calcifications are seen in the coronary arteries. The great vessels are normal in appearance. The pulmonary arteries are normal in appearance. Lymph nodes: No enlarged axillary, hilar, or mediastinal lymph nodes. Bones and soft tissues: Acute mildly displaced fractures of the right eighth and seventh ribs posteriorly.  Acute mildly displaced fracture of the right fifth, sixth, seventh, eighth, ninth, 10th ribs anterolaterally. Extensive soft tissue emphysema in the right chest and neck. Upper abdomen: The imaged portion of the upper abdomen is unremarkable. 1. Acute mildly displaced fractures of the right fifth through 10th ribs anterolaterally. 2. Acute mildly displaced fractures of the right seventh and eighth ribs posteriorly. 3. Small right pneumothorax and hemothorax. 3. Extensive right chest wall and right neck emphysema. Signed by Dr Carley Austin      Pertinent Labs:   CBC:   Recent Labs     09/20/21 1817 09/21/21 0412   WBC 9.6 8.4   HGB 14.0 13.0    307     BMP:    Recent Labs     09/20/21 1817 09/21/21 0412   * 138   K 3.9 3.7    101   CO2 25 24   BUN 21 18   CREATININE 0.7 0.7   GLUCOSE 109 117*     INR:   Recent Labs     09/21/21 0412   INR 1.14     Lipids: No results for input(s): CHOL, HDL in the last 72 hours. Invalid input(s): LDLCALCU  ABGs:No results for input(s): PHART, MGX9KYR, PO2ART, CBH0SEX, BEART, HGBAE, O4NPTIUP, CARBOXHGBART, 02THERAPY in the last 72 hours. HgBA1c:  No results for input(s): LABA1C in the last 72 hours. Procedures:   Hospital Course:   Patient is a 61-year-old female with medical history significant for osteoarthritis of the hypertension, knee status post knee replacement surgery who presented to the ED yesterday evening with right-sided chest pain after a mechanical fall. Imaging was reported to show acute mildly displaced fractures of the right fifth through 10th ribs with associated right pneumothorax, hemothorax, extensive right chest wall and right neck emphysema. Patient was started on as needed pain meds. CT surgery team reviewed patient and recommended no intervention at this time but continued analgesic. Patient was deemed stable and is being discharged home to follow-up with her PCP.   Physical Exam:  Vital Signs: /62   Pulse 73   Temp 96.6 °F (35.9 °C) (Temporal)   Resp 18   SpO2 91%   General appearance:.Elderly  female seen lying down in no acute cardiopulmonary distress. Alert and Cooperative   HEENT: Normocephalic. Chest: Tenderness in the right side of the rib cage. Clear to auscultation bilaterally without wheezes or rhonchi. Cardiac: Normal heart tones with regular rate and rhythm, S1, S2 normal. No murmurs, gallops, or rubs auscultated. Abdomen:soft, non-tender; normal bowel sounds, no masses, no organomegaly. Extremities: No clubbing or cyanosis. No peripheral edema. Peripheral pulses palpable. Neurologic: Grossly intact. Discharge Medications:       Nick Wood   Home Medication Instructions N:[id number]    Printed on:09/21/21 [id number]   Medication Information                      albuterol sulfate  (90 Base) MCG/ACT inhaler  TAKE 2 PUFFS BY MOUTH EVERY 6 HOURS AS NEEDED FOR WHEEZE             amLODIPine (NORVASC) 10 MG tablet  TAKE 1 TABLET BY MOUTH EVERY DAY             atorvastatin (LIPITOR) 40 MG tablet  Take 1 tablet by mouth daily             calcium carbonate (OSCAL) 500 MG TABS tablet  Take 500 mg by mouth daily. Cholecalciferol (VITAMIN D) 50 MCG (2000 UT) CAPS capsule  Take by mouth             citalopram (CELEXA) 40 MG tablet  TAKE 1 TABLET BY MOUTH EVERY DAY             DULoxetine (CYMBALTA) 60 MG extended release capsule  TAKE 1 CAPSULE BY MOUTH EVERY DAY             fluticasone (FLONASE) 50 MCG/ACT nasal spray  2 sprays by Nasal route daily             gabapentin (NEURONTIN) 100 MG capsule  Take 200 mg by mouth 2 times daily. HYDROcodone-acetaminophen (NORCO) 7.5-325 MG per tablet  Take one up to three times a day as needed for arthritis flares             hydrOXYzine (ATARAX) 50 MG tablet  TAKE 1 TABLET BY MOUTH NIGHTLY AS NEEDED (SLEEP).              indapamide (LOZOL) 2.5 MG tablet  TAKE 1 TABLET BY MOUTH TWICE A DAY             ketorolac (TORADOL) 10 MG tablet  Take 1 tablet by mouth 3 times daily for 5 days KLOR-CON M20 20 MEQ extended release tablet  TAKE 1 TABLET BY MOUTH TWICE A DAY             losartan-hydroCHLOROthiazide (HYZAAR) 100-25 MG per tablet  TAKE 1 TABLET BY MOUTH EVERY DAY             mirabegron (MYRBETRIQ) 50 MG TB24  Take 50 mg by mouth daily             nystatin (MYCOSTATIN) 638204 UNIT/GM powder  Apply 3 times daily. Pediatric Multivitamins-Fl (MULT-VITAMIN/FLUORIDE PO)  Take  by mouth.               polyethylene glycol (GLYCOLAX) powder  DISSOLVE 1 CAPFUL (17 GRAMS) IN 8 OUNCES WATER TWICE A DAY             tiZANidine (ZANAFLEX) 2 MG tablet  Take 1 tablet by mouth every 6 hours as needed (For muscle spasms)             verapamil (CALAN SR) 240 MG extended release tablet  TAKE 1 TABLET BY MOUTH TWICE A DAY             zinc gluconate 50 MG tablet  Take 50 mg by mouth daily             zolpidem (AMBIEN) 10 MG tablet  TAKE 1 TABLET BY MOUTH NIGHTLY AS NEEDED FOR SLEEP FOR UP TO 30 DAYS. Discharge Instructions: Follow up with Romulo Thompson MD in 7 days. Take medications as directed. Resume activity as tolerated. Diet: ADULT DIET; Regular     Disposition: Patient is medically stable and will be discharged home.     Time spent on discharge-37 minutes    Signed:  Duncan Draper MD  9/21/2021 3:17 PM

## 2021-09-21 NOTE — H&P
[facility] Hospitalists      Hospitalist - History & Physical      PCP: Da Molina MD    Date of Admission: 9/20/2021    Date of Service: 9/21/2021    Chief Complaint: Pneumothorax secondary to broken ribs    History Of Present Illness: The patient is a 80 y.o. female with a history of CAD, arthritis, HLD, HTN and SOA on exertion who presented to [facility] ED complaining of broken ribs. The patient had seen her PCP due to a fall while getting up to go to the restroom 2 to 3 days ago. She has had ongoing posterior lateral pain after falling into her bedpost. Her PCP told her to come to the ED after x-ray showed 3 rib fractures with a small pneumothorax. Further ED work-up revealed acutely mild displaced fractures of the right fifth through 10th ribs anterior laterally, acute mildly displaced fractures of the right seventh and eighth ribs posteriorly with small pneumothorax and hemothorax. There was also extensive right chest wall and right neck emphysema noted on a CT of the chest with contrast.  Hospital services are being requested to admit patient for monitoring of hemopneumothorax and rib fractures. The patient states that she tripped while going to the restroom 2 to 3 days ago and fell into the her bedpost hitting her right rib area. She denies any open wounds. She denies any syncope or near syncope. She denies dizziness. She also had some associated shortness of breath which has slightly worsened. She states that it is most uncomfortable whenever she goes from this laying to sitting position and complains of spasms in her back. She states she went to her PCP after her pain persisted and her shortness of breath worsened. She denies cough or sputum production. She denies any recent illnesses.       Past Medical History:        Diagnosis Date    Arthritis     CAD (coronary artery disease)     Colon polyps     Constipation     Diverticulosis     Gall stones     Gallstones     Hayfever Problem Relation Age of Onset    Heart Disease Mother     COPD Mother     Heart Disease Father     High Blood Pressure Father     High Blood Pressure Sister     High Blood Pressure Brother     High Blood Pressure Sister        Review of Systems:   Review of Systems   Constitutional: Negative for chills, diaphoresis, fatigue and fever. HENT: Negative for congestion, ear pain, sinus pain, sore throat and trouble swallowing. Eyes: Negative for visual disturbance. Respiratory: Positive for shortness of breath. Negative for cough and wheezing. Cardiovascular: Negative for chest pain, palpitations and leg swelling. Gastrointestinal: Negative for abdominal distention, abdominal pain, blood in stool, constipation, diarrhea, nausea and vomiting. Endocrine: Negative for cold intolerance and heat intolerance. Genitourinary: Negative for difficulty urinating, flank pain, frequency and urgency. Musculoskeletal: Positive for back pain (Right side/flank area secondary to fx). Negative for arthralgias and myalgias. Spasms   Neurological: Negative for dizziness, syncope, weakness, light-headedness, numbness and headaches. Hematological: Does not bruise/bleed easily. Psychiatric/Behavioral: Negative for agitation, confusion and dysphoric mood. 14 point review of systems is negative except as specifically addressed above. Physical Examination:  /78   Pulse 73   Temp 96.3 °F (35.7 °C) (Temporal)   Resp 18   SpO2 92%   Physical Exam  Constitutional:       General: She is not in acute distress. Appearance: Normal appearance. She is not toxic-appearing or diaphoretic. Comments: Appears uncomfortable   HENT:      Head: Normocephalic and atraumatic. Right Ear: External ear normal.      Left Ear: External ear normal.      Nose: Nose normal. No congestion or rhinorrhea. Mouth/Throat:      Mouth: Mucous membranes are moist.      Pharynx: Oropharynx is clear.    Eyes: General: No scleral icterus. Extraocular Movements: Extraocular movements intact. Conjunctiva/sclera: Conjunctivae normal.   Cardiovascular:      Rate and Rhythm: Normal rate and regular rhythm. Pulses: Normal pulses. Heart sounds: Normal heart sounds. No murmur heard. No friction rub. No gallop. Pulmonary:      Effort: Pulmonary effort is normal. Tachypnea present. No respiratory distress. Breath sounds: Examination of the right-lower field reveals decreased breath sounds. Decreased breath sounds present. No wheezing, rhonchi or rales. Abdominal:      General: Abdomen is flat. Bowel sounds are normal. There is no distension. Palpations: Abdomen is soft. Tenderness: There is no abdominal tenderness. Musculoskeletal:         General: No swelling. Normal range of motion. Cervical back: Normal range of motion and neck supple. Right lower leg: No edema. Left lower leg: No edema. Skin:     General: Skin is warm and dry. Coloration: Skin is not jaundiced. Findings: Bruising and ecchymosis present. No erythema, lesion or rash. Comments: Bruising ? ?hematoma to the right flank region above iliac crest   Neurological:      General: No focal deficit present. Mental Status: She is alert and oriented to person, place, and time. Mental status is at baseline. Cranial Nerves: No cranial nerve deficit. Sensory: No sensory deficit. Motor: No weakness. Psychiatric:         Mood and Affect: Mood normal.         Behavior: Behavior normal.         Thought Content:  Thought content normal.         Judgment: Judgment normal.          Diagnostic Data:  CBC:  Recent Labs     09/20/21 1817   WBC 9.6   HGB 14.0   HCT 45.2        BMP:  Recent Labs     09/20/21 1817   *   K 3.9      CO2 25   BUN 21   CREATININE 0.7   CALCIUM 9.6     Recent Labs     09/20/21 1817   AST 28   ALT 24   BILITOT 0.5   ALKPHOS 82 Urinalysis:  Lab Results   Component Value Date    NITRU NEGATIVE 03/27/2014    WBCUA 6 03/27/2014    BACTERIA NEGATIVE 03/27/2014    RBCUA 3 03/27/2014    BLOODU mod 04/05/2019    SPECGRAV 1.010 04/05/2019    GLUCOSEU n 04/05/2019    GLUCOSEU NEGATIVE 03/27/2014       XR RIBS RIGHT INCLUDE CHEST (MIN 3 VIEWS)    Result Date: 9/20/2021  Examination. XR RIBS RIGHT INCLUDE CHEST (MIN 3 VIEWS) 9/20/2021 1:39 PM History: The patient fell couple days ago and hit the back post. The frontal projection of the chest and frontal and oblique views of the right ribs are obtained. The comparison is made with the previous study dated 3/27/2014. The lungs are poorly expanded with areas of discoid atelectasis. There is a small, 10% pneumothorax in the right upper chest laterally. There is some blunting of the right lateral costophrenic sulcus which may represent pleural effusion or subpleural hematoma. The heart size in the normal range. Atheromatous changes thoracic aorta are noted. Frontal and oblique views of the right ribs show evidence of fractures involving the sixth, seventh and eighth ribs laterally and anterolaterally. The lower ribs are suboptimally evaluated due to extensive superimposed soft tissue emphysema. There is moderate diffuse osteopenia. Multiple right-sided displaced rib fractures. A small right upper chest pneumothorax. No evidence to suggest a tension pneumothorax. Soft tissue air along the right lower lateral chest wall. (The integrity of the liver may be evaluated with CT scan of the abdomen). The above report was called to the care provider, Dr. Judit Vela. Signed by Dr Shelly Lynne Date: 9/20/2021  CT CHEST W CONTRAST 9/20/2021 7:19 PM HISTORY: 9/20/2021 COMPARISON: None Radiation dose equals DLP 1395 mGy-cm. Automated exposure control dose reduction technique was implemented.  TECHNIQUE: Serial helical tomographic images of the chest were acquired following the infusion of Isovue contrast intravenously. Bone and soft tissue algorithms were provided. FINDINGS: Neck base: The imaged portion of the neck and thyroid gland is unremarkable. Lungs: There is a small right pneumothorax, maximum air gap of 1 cm. A small right pleural effusion, which measures up to 22 HU, consistent with hemothorax. There is no evidence of ongoing extravasation. . There is an adjacent right lower lobe atelectasis. . The trachea and bronchial tree are patent. Heart: Normal in size and appearance. There is no pericardial effusion. Vasculature: There is aortic atherosclerosis without stenosis or aneurysm. Calcifications are seen in the coronary arteries. The great vessels are normal in appearance. The pulmonary arteries are normal in appearance. Lymph nodes: No enlarged axillary, hilar, or mediastinal lymph nodes. Bones and soft tissues: Acute mildly displaced fractures of the right eighth and seventh ribs posteriorly. Acute mildly displaced fracture of the right fifth, sixth, seventh, eighth, ninth, 10th ribs anterolaterally. Extensive soft tissue emphysema in the right chest and neck. Upper abdomen: The imaged portion of the upper abdomen is unremarkable. 1. Acute mildly displaced fractures of the right fifth through 10th ribs anterolaterally. 2. Acute mildly displaced fractures of the right seventh and eighth ribs posteriorly. 3. Small right pneumothorax and hemothorax. 3. Extensive right chest wall and right neck emphysema.    Signed by Dr Simin Whyte      Assessment/Plan:  Principal Problem:    Pneumothorax   -Admit to med/surg with tele-Full code   -Consult CTS-pnemothorax   -Vitals per unit routine   -I&O Q shift   -Up with assist   -2 view x-ray in AM   -NS at 75ml/hr    -Regular diet   -SCD's for VTE prophylaxis   -Hold NSAID's    -Monitor for s/s of respiratory distress     Active Problems:  Chest wall Pain   -Norco Q 8 prn   -Tizanidine 2 mg po Q 6 prn

## 2021-09-21 NOTE — CARE COORDINATION
Date / Time of Evaluation:   9/21/2021    2:26 PM  Assessment Completed by:   Osiris Gunderson, RN, BSN      Patient Name:   Emy Cleaning  MRN:   [id number]  YOB: 1940    Patient Admission Status:   Observation [104]    Patient Contact Information:    [address]  [phone] (home)   Telephone Information:   Mobile [phone]     Above information verified? [x]   Yes  []   No    (Best Practice:   Have patient/caregiver verify above address and phone number by stating out loud their current address and reachable phone number. Initial Assessment Completed at bedside with:      [x]   Patient  []   Family/Caregiver/Guardian   []   Other:      Current PCP:    Markus Rome MD    PCP verified? [x]   Yes  []   No    Emergency Contacts:    Extended Emergency Contact Information  Primary Emergency Contact: [place] [address] Phone: [phone]  Relation: Child  Secondary Emergency Contact: Pat Wilkinson  Relation: Child    Advance Directives:    Does Ms. Jane have an advance directive in her electronic medical record? []   Yes  [x]   No    Code Status:   Full Code      Have you been vaccinated for COVID-19 (SARS-CoV-2)? [x]   Yes  []   No                   If so, when?    Did not have her card with her  Which :         []   [employer]  []   [employer]  []   [employer]  []   Other:       Do you have any of the following unmet social needs that would keep you from returning home safely:    []   Yes  [x]   No                    Unmet Social Needs:           []   Living Situation/Housing  []   Food  []   Stroke Education   []   Utilities  []   Personal Safety  []   Financial Strain  []   Employment  []   Mental Health  []   Substance Abuse  []   Transportation Barriers    Additional Unmet Social Needs Notes:     NA    Financial:    Payor: Esthela Duong / Plan: Cricket Hammonds / Product Type: *No Product type* /     Pre-Cert required for SNF:     [x]   Yes  []   No    Have Long Term Care Insurance:      []   Yes  [x]   No      Pharmacy:    [facility]  [address] [place] [address]  Phone: [phone] Fax: [phone]    Potential assistance purchasing medications? []   Yes  [x]   No      ADLS:       Support System:   Daughter    Current Home Environment:       Steps:       [x]   Yes  []   No    If yes, how many? 3    Plans to RETURN to current housing:     [x]   Yes  []   No    Barriers to RETURNING to current housing:    NA    Currently ACTIVE with Home Health CARE:      []   Yes  [x]   No    Home Health Care Agency:    NA    DME Provider:   MARYANNE    Transition Plan:  Home with daughter    Transportation PLAN for Discharge:  Daughter    Patient Deficits:    []   Yes   [x]   No    [place] Coma Scale  Eye Opening: Spontaneous  Best Verbal Response: Oriented  Best Motor Response: Obeys commands  [place] Coma Scale Score: 15    Patient Deficit Notes:     NA    Additional CM/SW Notes:   I spoke with Ms. Jane. She states that her daughter lives with her. She has about 3 steps to get into her home. She has a walker, cane, and shower chair at home. She does not utilize home healthcare. No needs identified. Will continue to follow.     Nica Rothman and/or her family were provided with choice of provider:    [x]   Yes   []   No      Rolanda Pardo RN, BSN  [address]  Phone: [phone]   Fax:  [phone]    Electronically signed by Rolanda Pardo RN, BSN on 9/21/2021 at 2:31 PM

## 2021-09-21 NOTE — PROGRESS NOTES
Unable to verify home meds with patient, pt unable to recall exact names of medications and dosages, pt said daughter will bring a medication list in the AM.

## 2021-09-22 ENCOUNTER — CARE COORDINATION (OUTPATIENT)
Dept: CASE MANAGEMENT | Age: 81
End: 2021-09-22

## 2021-09-22 NOTE — CARE COORDINATION
[facility] Transitions Initial Follow Up Call    Call within 2 business days of discharge: Yes    Patient: Trung Garcia Patient : 1940   MRN: [id number]  Reason for Admission: Pneumothorax  Discharge Date: 21 RARS: No data recorded    Last Discharge [facility]       Complaint Diagnosis Description Type Department Provider    21 Chest Injury Traumatic pneumothorax, initial encounter . .. ED to Hosp-Admission (Discharged) (ADMITTED) [facility] 5 SURG Seth Trent MD; Paulette Jade. .. Spoke with: N/A    Facility: [address] [place] [address]    Non-face-to-face services provided:  Reviewed encounter information for continuity of care prior to follow up Care Transitions phone call - chart notes, consults, progress notes, test results, med list, appointments, AVS, other information. Care Transitions 24 Hour Call    Care Transitions Interventions     Attempted to make contact with patient/caregiver for an initial transitions of care follow up call post discharge without success. Unable to leave a message regarding intent of call and call back information. Call was forwarded to an unidentifiable voice messaging system (mobile voice mail or telephone answering machine) and the mailbox is full. CTN will follow up again at a later time. Any previously scheduled hospital follow up appointments noted below.         Follow Up  Future Appointments   Date Time Provider Philip Newman   2021  3:45 PM [facility]   11/15/2021 11:00 AM [facility]       Digna Montaño RN

## 2021-09-23 ENCOUNTER — CARE COORDINATION (OUTPATIENT)
Dept: CASE MANAGEMENT | Age: 81
End: 2021-09-23

## 2021-09-23 NOTE — CARE COORDINATION
[facility] Transitions Initial Follow Up Call    Call within 2 business days of discharge: Yes    Patient: Melanie Vera Patient : 1940   MRN: [id number]  Reason for Admission: Pneumothorax  Discharge Date: 21 RARS: No data recorded    Last Discharge [facility]       Complaint Diagnosis Description Type Department Provider    21 Chest Injury Traumatic pneumothorax, initial encounter . .. ED to Hosp-Admission (Discharged) (ADMITTED) [facility] 5 SURG Kaye Pat MD; Adi Cates. .. Spoke with: N/A    Facility: [address] [place] [address]    Non-face-to-face services provided:  Reviewed encounter information for continuity of care prior to follow up Care Transitions phone call - chart notes, consults, progress notes, test results, med list, appointments, AVS, other information. Care Transitions 24 Hour Call    Care Transitions Interventions       Attempted to make contact with patient/caregiver for an initial transitions of care follow up call post discharge without success. Unable to leave a message regarding intent of call and call back information. Multiple rings, no answer. As this repeated attempt to make contact was unsuccessful, will disengage at this time. Any previously scheduled hospital follow up appointments noted below.       Follow Up  Future Appointments   Date Time Provider Philip Newman   2021  3:45 PM Sherran Fothergill, MD [facility]   11/15/2021 11:00 AM Sherran Fothergill, MD [facility] Andris Heads, RN

## 2021-09-27 ENCOUNTER — OFFICE VISIT (OUTPATIENT)
Dept: PRIMARY CARE CLINIC | Age: 81
End: 2021-09-27
Payer: MEDICARE

## 2021-09-27 VITALS
SYSTOLIC BLOOD PRESSURE: 94 MMHG | RESPIRATION RATE: 16 BRPM | OXYGEN SATURATION: 94 % | TEMPERATURE: 97.1 F | DIASTOLIC BLOOD PRESSURE: 50 MMHG | BODY MASS INDEX: 34.2 KG/M2 | HEART RATE: 65 BPM | WEIGHT: 193 LBS | HEIGHT: 63 IN

## 2021-09-27 DIAGNOSIS — I10 ESSENTIAL HYPERTENSION: ICD-10-CM

## 2021-09-27 DIAGNOSIS — S27.0XXA CLOSED TRAUMATIC FRACTURE OF RIBS OF RIGHT SIDE WITH PNEUMOTHORAX: Primary | ICD-10-CM

## 2021-09-27 DIAGNOSIS — S22.41XA CLOSED TRAUMATIC FRACTURE OF RIBS OF RIGHT SIDE WITH PNEUMOTHORAX: Primary | ICD-10-CM

## 2021-09-27 DIAGNOSIS — R07.81 RIB PAIN ON RIGHT SIDE: ICD-10-CM

## 2021-09-27 DIAGNOSIS — R53.1 WEAKNESS: ICD-10-CM

## 2021-09-27 PROCEDURE — 99495 TRANSJ CARE MGMT MOD F2F 14D: CPT | Performed by: FAMILY MEDICINE

## 2021-09-27 PROCEDURE — 1111F DSCHRG MED/CURRENT MED MERGE: CPT | Performed by: FAMILY MEDICINE

## 2021-09-27 NOTE — PROGRESS NOTES
Post-Discharge Transitional Care Management Services or Hospital Follow Up      Trung Garcia   YOB: 1940    Date of Office Visit:  9/27/2021  Date of Hospital Admission: 9/20/21  Date of Hospital Discharge: 9/21/21  Risk of hospital readmission (high >=14%.  Medium >=10%) :No data recorded    Care management risk score Rising risk (score 2-5) and Complex Care (Scores >=6): 0     Non face to face  following discharge, date last encounter closed (first attempt may have been earlier): 9/23/2021 10:13 AM    Call initiated 2 business days of discharge: Yes    Patient Active Problem List   Diagnosis    Gall stones    Arthritis    Pure hypercholesterolemia    Functional constipation    Osteopenia    Panic attacks    Seasonal allergies    Insomnia    Hyperlipidemia    Essential hypertension    Shortness of breath on exertion    Pneumothorax    Chest wall pain    Aftercare for healing traumatic fracture of lower leg    Cervical disc disorder with radiculopathy    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 [address]    Spondylosis of lumbosacral region without myelopathy or radiculopathy       Allergies   Allergen Reactions    Pollen Extract      Nasal congestion       Medications listed as ordered at the time of discharge from hospital   Tracee Mccoy   Home Medication Instructions DENI:    Printed on:09/27/21 4067   Medication Information                      albuterol sulfate  (90 Base) MCG/ACT inhaler  TAKE 2 PUFFS BY MOUTH EVERY 6 HOURS AS NEEDED FOR WHEEZE             amLODIPine (NORVASC) 10 MG tablet  TAKE 1 TABLET BY MOUTH EVERY DAY             atorvastatin (LIPITOR) 40 MG tablet  Take 1 tablet by mouth daily             calcium carbonate (OSCAL) 500 MG TABS tablet  Take 500 mg by mouth daily. Cholecalciferol (VITAMIN D) 50 MCG (2000 UT) CAPS capsule  Take by mouth             citalopram (CELEXA) 40 MG tablet  TAKE 1 TABLET BY MOUTH EVERY DAY             DULoxetine (CYMBALTA) 60 MG extended release capsule  TAKE 1 CAPSULE BY MOUTH EVERY DAY             fluticasone (FLONASE) 50 MCG/ACT nasal spray  2 sprays by Nasal route daily             gabapentin (NEURONTIN) 100 MG capsule  Take 200 mg by mouth 2 times daily. HYDROcodone-acetaminophen (NORCO) 7.5-325 MG per tablet  Take one up to three times a day as needed for arthritis flares             hydrOXYzine (ATARAX) 50 MG tablet  TAKE 1 TABLET BY MOUTH NIGHTLY AS NEEDED (SLEEP). indapamide (LOZOL) 2.5 MG tablet  TAKE 1 TABLET BY MOUTH TWICE A DAY             ketorolac (TORADOL) 10 MG tablet  Take 1 tablet by mouth 3 times daily for 5 days             KLOR-CON M20 20 MEQ extended release tablet  TAKE 1 TABLET BY MOUTH TWICE A DAY             losartan-hydroCHLOROthiazide (HYZAAR) 100-25 MG per tablet  TAKE 1 TABLET BY MOUTH EVERY DAY             mirabegron (MYRBETRIQ) 50 MG TB24  Take 50 mg by mouth daily             nystatin (MYCOSTATIN) 165422 UNIT/GM powder  Apply 3 times daily. Pediatric Multivitamins-Fl (MULT-VITAMIN/FLUORIDE PO)  Take  by mouth.               polyethylene glycol (GLYCOLAX) powder  DISSOLVE 1 CAPFUL (17 GRAMS) IN 8 OUNCES WATER TWICE A DAY             verapamil (CALAN SR) 240 MG extended release tablet  TAKE 1 TABLET BY MOUTH TWICE A DAY             zinc gluconate 50 MG tablet  Take 50 mg by mouth daily             zolpidem (AMBIEN) 10 MG tablet  TAKE 1 TABLET BY MOUTH NIGHTLY AS NEEDED FOR SLEEP FOR UP TO 30 DAYS.                    Medications marked \"taking\" at this time  Outpatient Medications Marked as Taking for the 9/27/21 encounter (Office Visit) with Moses BRIDGES MD Shan[place]   Medication Sig Dispense Refill    ketorolac (TORADOL) 10 MG tablet Take 1 tablet by mouth 3 times daily for 5 days 20 tablet 0    amLODIPine (NORVASC) 10 MG tablet TAKE 1 TABLET BY MOUTH EVERY DAY 90 tablet 3    losartan-hydroCHLOROthiazide (HYZAAR) 100-25 MG per tablet TAKE 1 TABLET BY MOUTH EVERY DAY 90 tablet 3    zolpidem (AMBIEN) 10 MG tablet TAKE 1 TABLET BY MOUTH NIGHTLY AS NEEDED FOR SLEEP FOR UP TO 30 DAYS. 30 tablet 0    citalopram (CELEXA) 40 MG tablet TAKE 1 TABLET BY MOUTH EVERY DAY 90 tablet 3    DULoxetine (CYMBALTA) 60 MG extended release capsule TAKE 1 CAPSULE BY MOUTH EVERY DAY 90 capsule 4    zinc gluconate 50 MG tablet Take 50 mg by mouth daily      Cholecalciferol (VITAMIN D) 50 MCG (2000 UT) CAPS capsule Take by mouth      fluticasone (FLONASE) 50 MCG/ACT nasal spray 2 sprays by Nasal route daily 3 Bottle 3    verapamil (CALAN SR) 240 MG extended release tablet TAKE 1 TABLET BY MOUTH TWICE A  tablet 3    albuterol sulfate  (90 Base) MCG/ACT inhaler TAKE 2 PUFFS BY MOUTH EVERY 6 HOURS AS NEEDED FOR WHEEZE 6.7 Inhaler 3    mirabegron (MYRBETRIQ) 50 MG TB24 Take 50 mg by mouth daily 30 tablet 5    indapamide (LOZOL) 2.5 MG tablet TAKE 1 TABLET BY MOUTH TWICE A  tablet 3    KLOR-CON M20 20 MEQ extended release tablet TAKE 1 TABLET BY MOUTH TWICE A  tablet 3    atorvastatin (LIPITOR) 40 MG tablet Take 1 tablet by mouth daily 90 tablet 3    nystatin (MYCOSTATIN) 698397 UNIT/GM powder Apply 3 times daily. 1 Bottle 1    gabapentin (NEURONTIN) 100 MG capsule Take 200 mg by mouth 2 times daily.  polyethylene glycol (GLYCOLAX) powder DISSOLVE 1 CAPFUL (17 GRAMS) IN 8 OUNCES WATER TWICE A DAY 1054 g 1    HYDROcodone-acetaminophen (NORCO) 7.5-325 MG per tablet Take one up to three times a day as needed for arthritis flares 30 tablet 0    hydrOXYzine (ATARAX) 50 MG tablet TAKE 1 TABLET BY MOUTH NIGHTLY AS NEEDED (SLEEP).  90 tablet 3    Pediatric Multivitamins-Fl (MULT-VITAMIN/FLUORIDE PO) Take  by mouth.  calcium carbonate (OSCAL) 500 MG TABS tablet Take 500 mg by mouth daily. Medications patient taking as of now reconciled against medications ordered at time of hospital discharge: Yes    Chief Complaint   Patient presents with    Follow-Up from Hospital     Pt is here for a follow up from the hospital for pneumothorax. She states she is walking with her walker more due to being afraid of falling. History of Present illness - Follow up of Hospital diagnosis(es): Patient is seen today for hospital follow-up after she was recently admitted because she developed a pneumothorax after a fall into a bedpost where she fractured several ribs on the right side. She came to our office after the fall and had an x-ray done and was found to have a right apical pneumothorax with some subcutaneous air. She was admitted and was seen in consultation by Dr. Tamara Connell at [facility]. He did not do any intervention and just recommended conservative management. Her proved when she was discharged. She states that she is not short of breath and denies any productive cough. She states that she is  on that side. Her blood pressure is low today. She states that she is taking her medications as prescribed. She states that she may be taking more than what she is supposed to because her daughter just put her bottles in her medication pack at home and she states that she is not sure if she is taking what she is supposed to be taking or the dosages. We have printed her medication list off and she is going to take this home and reviewed with the medications that she is taking. Inpatient course: Discharge summary reviewed- see chart. Interval history/Current status: See note above. The patient is doing okay. She is taking Norco that is prescribed by pain management for the pain. She states that this is manageable.     A comprehensive review of systems was negative except for what was noted in the HPI. Vitals:    09/27/21 1528   BP: (!) 94/50   Site: Right Upper Arm   Position: Sitting   Cuff Size: Large Adult   Pulse: 65   Resp: 16   Temp: 97.1 °F (36.2 °C)   TempSrc: Temporal   SpO2: 94%   Weight: 193 lb (87.5 kg)   Height: 5' 3\" (1.6 m)     Body mass index is 34.19 kg/m². Wt Readings from Last 3 Encounters:   09/27/21 193 lb (87.5 kg)   05/11/21 189 lb (85.7 kg)   11/10/20 189 lb (85.7 kg)     BP Readings from Last 3 Encounters:   09/27/21 (!) 94/50   09/21/21 120/62   05/11/21 120/62        Physical Exam:  General Appearance: alert and oriented to person, place and time, well-developed and well-nourished, in no acute distress  Skin: warm and dry, no rash or erythema  Head: normocephalic and atraumatic  Pulmonary/Chest: clear to auscultation bilaterally- no wheezes, rales or rhonchi, normal air movement, no respiratory distress  Bruising of right side of chest wall  Cardiovascular: normal rate, normal S1 and S2, no gallops, intact distal pulses and no carotid bruits  Extremities: no cyanosis and no clubbing  Musculoskeletal: normal range of motion, no joint swelling, deformity or tenderness  Neurologic: in wheelchair today    Assessment/Plan:  1. Closed traumatic fracture of ribs of right side with pneumothorax  We will repeat cxr today. - XR CHEST STANDARD (2 VW)    2. Rib pain on right side  Continue norco for rib pain as needed. 3. Essential hypertension  I have encouraged her to monitor her blood pressures at home. If it is continue to stay low and she is taking the medications that are prescribed at home as they are prescribed then we need to decrease her dosages some.     4. Weakness        Medical Decision Making: moderate complexity

## 2021-10-01 DIAGNOSIS — F51.01 PRIMARY INSOMNIA: ICD-10-CM

## 2021-10-01 DIAGNOSIS — F41.0 PANIC ATTACKS: Primary | ICD-10-CM

## 2021-10-01 RX ORDER — ZOLPIDEM TARTRATE 10 MG/1
5 TABLET ORAL NIGHTLY PRN
COMMUNITY
End: 2021-10-04 | Stop reason: DRUGHIGH

## 2021-10-01 RX ORDER — VERAPAMIL HYDROCHLORIDE 240 MG/1
240 TABLET, FILM COATED, EXTENDED RELEASE ORAL NIGHTLY
Qty: 180 TABLET | Refills: 3 | Status: SHIPPED | OUTPATIENT
Start: 2021-10-01 | End: 2022-05-16 | Stop reason: SDUPTHER

## 2021-10-01 RX ORDER — NYSTATIN 100000 [USP'U]/G
POWDER TOPICAL
Qty: 1 EACH | Refills: 3 | Status: SHIPPED | OUTPATIENT
Start: 2021-10-01 | End: 2022-04-27

## 2021-10-01 RX ORDER — ATORVASTATIN CALCIUM 40 MG/1
40 TABLET, FILM COATED ORAL DAILY
Qty: 90 TABLET | Refills: 3 | Status: SHIPPED | OUTPATIENT
Start: 2021-10-01 | End: 2022-08-26

## 2021-10-01 RX ORDER — DULOXETIN HYDROCHLORIDE 60 MG/1
60 CAPSULE, DELAYED RELEASE ORAL DAILY
Qty: 90 CAPSULE | Refills: 3 | Status: SHIPPED | OUTPATIENT
Start: 2021-10-01

## 2021-10-01 RX ORDER — INDAPAMIDE 2.5 MG/1
2.5 TABLET, FILM COATED ORAL DAILY
Qty: 180 TABLET | Refills: 3 | Status: SHIPPED | OUTPATIENT
Start: 2021-10-01 | End: 2022-03-16

## 2021-10-01 RX ORDER — POTASSIUM CHLORIDE 20 MEQ/1
20 TABLET, EXTENDED RELEASE ORAL DAILY
Qty: 180 TABLET | Refills: 3 | Status: SHIPPED | OUTPATIENT
Start: 2021-10-01 | End: 2022-04-27

## 2021-10-01 RX ORDER — ALPRAZOLAM 0.5 MG/1
0.5 TABLET ORAL 3 TIMES DAILY PRN
COMMUNITY
End: 2021-11-04

## 2021-10-01 RX ORDER — ALPRAZOLAM 0.5 MG/1
0.5 TABLET ORAL 3 TIMES DAILY PRN
Qty: 30 TABLET | Refills: 2 | Status: CANCELLED | OUTPATIENT
Start: 2021-10-01 | End: 2021-10-31

## 2021-10-01 RX ORDER — ZOLPIDEM TARTRATE 10 MG/1
TABLET ORAL
Refills: 0 | OUTPATIENT
Start: 2021-10-01

## 2021-10-03 DIAGNOSIS — F51.01 PRIMARY INSOMNIA: ICD-10-CM

## 2021-10-03 RX ORDER — ALPRAZOLAM 0.5 MG/1
0.5 TABLET ORAL 3 TIMES DAILY PRN
Refills: 0 | OUTPATIENT
Start: 2021-10-03 | End: 2021-11-02

## 2021-10-03 RX ORDER — ZOLPIDEM TARTRATE 10 MG/1
5 TABLET ORAL NIGHTLY PRN
Qty: 30 TABLET | Refills: 0 | OUTPATIENT
Start: 2021-10-03 | End: 2021-11-02

## 2021-10-04 DIAGNOSIS — F51.01 PRIMARY INSOMNIA: Primary | ICD-10-CM

## 2021-10-04 RX ORDER — ZOLPIDEM TARTRATE 5 MG/1
5 TABLET ORAL NIGHTLY PRN
Qty: 30 TABLET | Refills: 2 | Status: SHIPPED | OUTPATIENT
Start: 2021-10-04 | End: 2021-11-29 | Stop reason: SDUPTHER

## 2021-10-04 RX ORDER — ZOLPIDEM TARTRATE 10 MG/1
TABLET ORAL
Qty: 30 TABLET | Refills: 0 | OUTPATIENT
Start: 2021-10-04 | End: 2021-11-03

## 2021-10-05 ENCOUNTER — TELEPHONE (OUTPATIENT)
Dept: PRIMARY CARE CLINIC | Age: 81
End: 2021-10-05

## 2021-10-05 RX ORDER — CIPROFLOXACIN 250 MG/1
250 TABLET, FILM COATED ORAL 2 TIMES DAILY
Qty: 14 TABLET | Refills: 0 | Status: SHIPPED | OUTPATIENT
Start: 2021-10-05 | End: 2021-10-12

## 2021-10-05 NOTE — TELEPHONE ENCOUNTER
----- Message from Abran Ordaz sent at 10/5/2021  8:09 AM CDT -----  Subject: Message to Provider    QUESTIONS  Information for Provider? Just had an appt last week for broken ribs, but   it is hard for her to get around. She is having UTI/bladder infection   symptoms with low back pain and frequency. Wanting to know if we can just   call her in something for that rather than getting an appt, as it is hard   for her to move around. Has been going on a couple of days now.   ---------------------------------------------------------------------------  --------------  CALL BACK INFO  What is the best way for the office to contact you? OK to leave message on   voicemail  Preferred Call Back Phone Number? [phone]  ---------------------------------------------------------------------------  --------------  SCRIPT ANSWERS  Relationship to Patient? Other  Representative Name? Ray Babak  Is the [facility] Life on the appropriate HIPAA document in Epic?  Yes

## 2021-11-03 DIAGNOSIS — F41.0 PANIC DISORDER (EPISODIC PAROXYSMAL ANXIETY): ICD-10-CM

## 2021-11-04 RX ORDER — ALPRAZOLAM 0.5 MG/1
TABLET ORAL
Qty: 90 TABLET | Refills: 0 | Status: SHIPPED | OUTPATIENT
Start: 2021-11-04 | End: 2022-01-06 | Stop reason: SDUPTHER

## 2021-11-09 ENCOUNTER — OFFICE VISIT (OUTPATIENT)
Dept: PRIMARY CARE CLINIC | Age: 81
End: 2021-11-09
Payer: MEDICARE

## 2021-11-09 VITALS
HEIGHT: 63 IN | HEART RATE: 75 BPM | SYSTOLIC BLOOD PRESSURE: 124 MMHG | OXYGEN SATURATION: 95 % | RESPIRATION RATE: 16 BRPM | BODY MASS INDEX: 32.07 KG/M2 | DIASTOLIC BLOOD PRESSURE: 60 MMHG | TEMPERATURE: 97.1 F | WEIGHT: 181 LBS

## 2021-11-09 DIAGNOSIS — E78.5 HYPERLIPIDEMIA, UNSPECIFIED HYPERLIPIDEMIA TYPE: ICD-10-CM

## 2021-11-09 DIAGNOSIS — F41.9 ANXIETY: Primary | ICD-10-CM

## 2021-11-09 DIAGNOSIS — F51.01 PRIMARY INSOMNIA: ICD-10-CM

## 2021-11-09 DIAGNOSIS — I10 ESSENTIAL HYPERTENSION: ICD-10-CM

## 2021-11-09 DIAGNOSIS — Z23 NEED FOR INFLUENZA VACCINATION: ICD-10-CM

## 2021-11-09 PROCEDURE — G8399 PT W/DXA RESULTS DOCUMENT: HCPCS | Performed by: FAMILY MEDICINE

## 2021-11-09 PROCEDURE — G0008 ADMIN INFLUENZA VIRUS VAC: HCPCS | Performed by: FAMILY MEDICINE

## 2021-11-09 PROCEDURE — 90694 VACC AIIV4 NO PRSRV 0.5ML IM: CPT | Performed by: FAMILY MEDICINE

## 2021-11-09 PROCEDURE — 99214 OFFICE O/P EST MOD 30 MIN: CPT | Performed by: FAMILY MEDICINE

## 2021-11-09 PROCEDURE — G8427 DOCREV CUR MEDS BY ELIG CLIN: HCPCS | Performed by: FAMILY MEDICINE

## 2021-11-09 PROCEDURE — 1090F PRES/ABSN URINE INCON ASSESS: CPT | Performed by: FAMILY MEDICINE

## 2021-11-09 PROCEDURE — 4040F PNEUMOC VAC/ADMIN/RCVD: CPT | Performed by: FAMILY MEDICINE

## 2021-11-09 PROCEDURE — 1123F ACP DISCUSS/DSCN MKR DOCD: CPT | Performed by: FAMILY MEDICINE

## 2021-11-09 RX ORDER — CETIRIZINE HYDROCHLORIDE 5 MG/1
5 TABLET ORAL DAILY
Qty: 30 TABLET | Refills: 0 | Status: SHIPPED | OUTPATIENT
Start: 2021-11-09 | End: 2021-12-02

## 2021-11-09 ASSESSMENT — ENCOUNTER SYMPTOMS
NAUSEA: 0
DIARRHEA: 0
BACK PAIN: 0
EYE DISCHARGE: 0
COLOR CHANGE: 0
VOMITING: 0
ABDOMINAL PAIN: 0
COUGH: 0
WHEEZING: 0

## 2021-11-09 NOTE — PROGRESS NOTES
SUBJECTIVE:    Patient ID: Jackie Arenas is a 80 y.o. female. HPI:   Patient presents today for 6-month follow-up. She states that overall she is feeling okay. She states that she is still tired and rundown. She fell a couple of months ago and broke her ribs and had a pneumothorax related to this. She states that she is still sore in that area but is getting better. She states that she just does not feel like she has much energy. She states that she is not having any hemoptysis. She denies any fevers chills or night sweats. She states that she is not having any weight loss. She does have a history of anxiety. She is on Xanax. She states that this is working well. She states that she does not need a refill on this today. She is also on Ambien for insomnia. This is working well for her as well. She did have blood work done back when she was in the hospital in September. Past Medical History:   Diagnosis Date    Arthritis     Colon polyps     Constipation     Diverticulosis     Gall stones     Gallstones     Hayfever     Heart palpitations     Hypertension     Insomnia     Nasal drainage     Osteoarthritis     Osteopenia 01/01/2013    T score -1.7    Panic attacks     Polyp of colon     Rectal carcinoid tumor 12/01/2014    Dr. Guillen Nicely found on scope    Seasonal allergies     Shortness of breath on exertion     Sneezing     Urinary frequency    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    Whooping cough     9 mos old      Current Outpatient Medications on File Prior to Visit   Medication Sig Dispense Refill    ALPRAZolam (XANAX) 0.5 MG tablet TAKE 1 TABLET BY MOUTH 3 TIMES DAILY AS NEEDED FOR ANXIETY FOR UP TO 30 DAYS. 90 tablet 0    zolpidem (AMBIEN) 5 MG tablet Take 1 tablet by mouth nightly as needed for Sleep for up to 90 days.  30 tablet 2    nystatin (MYCOSTATIN) 989012 UNIT/GM powder Apply 3 times daily. 1 each 3    atorvastatin (LIPITOR) 40 MG tablet Take 1 tablet by mouth daily 90 tablet 3    potassium chloride (KLOR-CON M20) 20 MEQ extended release tablet Take 1 tablet by mouth daily 180 tablet 3    indapamide (LOZOL) 2.5 MG tablet Take 1 tablet by mouth daily 180 tablet 3    mirabegron (MYRBETRIQ) 50 MG TB24 Take 50 mg by mouth daily 30 tablet 5    verapamil (CALAN SR) 240 MG extended release tablet Take 1 tablet by mouth nightly 180 tablet 3    DULoxetine (CYMBALTA) 60 MG extended release capsule Take 1 capsule by mouth daily 90 capsule 3    ketorolac (TORADOL) 10 MG tablet Take 1 tablet by mouth 3 times daily for 5 days 20 tablet 0    amLODIPine (NORVASC) 10 MG tablet TAKE 1 TABLET BY MOUTH EVERY DAY 90 tablet 3    losartan-hydroCHLOROthiazide (HYZAAR) 100-25 MG per tablet TAKE 1 TABLET BY MOUTH EVERY DAY 90 tablet 3    citalopram (CELEXA) 40 MG tablet TAKE 1 TABLET BY MOUTH EVERY DAY 90 tablet 3    zinc gluconate 50 MG tablet Take 50 mg by mouth daily      Cholecalciferol (VITAMIN D) 50 MCG (2000 UT) CAPS capsule Take by mouth      fluticasone (FLONASE) 50 MCG/ACT nasal spray 2 sprays by Nasal route daily 3 Bottle 3    albuterol sulfate  (90 Base) MCG/ACT inhaler TAKE 2 PUFFS BY MOUTH EVERY 6 HOURS AS NEEDED FOR WHEEZE 6.7 Inhaler 3    gabapentin (NEURONTIN) 100 MG capsule Take 200 mg by mouth 2 times daily.  polyethylene glycol (GLYCOLAX) powder DISSOLVE 1 CAPFUL (17 GRAMS) IN 8 OUNCES WATER TWICE A DAY 1054 g 1    HYDROcodone-acetaminophen (NORCO) 7.5-325 MG per tablet Take one up to three times a day as needed for arthritis flares 30 tablet 0    hydrOXYzine (ATARAX) 50 MG tablet TAKE 1 TABLET BY MOUTH NIGHTLY AS NEEDED (SLEEP). 90 tablet 3    Pediatric Multivitamins-Fl (MULT-VITAMIN/FLUORIDE PO) Take  by mouth.  calcium carbonate (OSCAL) 500 MG TABS tablet Take 500 mg by mouth daily.          No current facility-administered medications on file prior to visit. Allergies   Allergen Reactions    Pollen Extract      Nasal congestion       Review of Systems   Constitutional: Negative for activity change, appetite change and fever. HENT: Negative for congestion and nosebleeds. Eyes: Negative for discharge. Respiratory: Negative for cough and wheezing. Cardiovascular: Negative for chest pain and leg swelling. Gastrointestinal: Negative for abdominal pain, diarrhea, nausea and vomiting. Genitourinary: Negative for difficulty urinating, frequency and urgency. Musculoskeletal: Negative for back pain and gait problem. Skin: Negative for color change and rash. Neurological: Negative for dizziness and headaches. Hematological: Does not bruise/bleed easily. Psychiatric/Behavioral: Negative for sleep disturbance and suicidal ideas. The patient is nervous/anxious. OBJECTIVE:    Physical Exam  Vitals reviewed. Constitutional:       General: She is not in acute distress. Appearance: Normal appearance. She is well-developed. She is not diaphoretic. HENT:      Head: Normocephalic and atraumatic. Right Ear: External ear normal.      Left Ear: External ear normal.   Cardiovascular:      Rate and Rhythm: Normal rate and regular rhythm. Pulses: Normal pulses. Heart sounds: Normal heart sounds. No murmur heard. Pulmonary:      Effort: Pulmonary effort is normal. No respiratory distress. Breath sounds: Normal breath sounds. Musculoskeletal:      Cervical back: Normal range of motion and neck supple. Skin:     General: Skin is warm and dry. Neurological:      General: No focal deficit present. Mental Status: She is alert and oriented to person, place, and time. Mental status is at baseline. Psychiatric:         Mood and Affect: Mood normal.         Behavior: Behavior normal.         Thought Content:  Thought content normal.         Judgment: Judgment normal.        /60 (Site: Right Upper Arm, Position: Sitting, Cuff Size: Large Adult)   Pulse 75   Temp 97.1 °F (36.2 °C) (Temporal)   Resp 16   Ht 5' 3\" (1.6 m)   Wt 181 lb (82.1 kg)   SpO2 95%   BMI 32.06 kg/m²      ASSESSMENT/PLAN:    Lucretia Contreras was seen today for 6 month follow-up. Diagnoses and all orders for this visit:    Anxiety    Need for influenza vaccination  -     INFLUENZA, QUADV, ADJUVANTED, 72 YRS =, IM, PF, PREFILL SYR, 0.5ML (FLUAD)    Primary insomnia    Essential hypertension    Hyperlipidemia, unspecified hyperlipidemia type    Other orders  -     cetirizine (ZYRTEC) 5 MG tablet; Take 1 tablet by mouth daily        Problem List     Essential hypertension    Hyperlipidemia    Relevant Medications    amLODIPine (NORVASC) 10 MG tablet    losartan-hydroCHLOROthiazide (HYZAAR) 100-25 MG per tablet    atorvastatin (LIPITOR) 40 MG tablet    indapamide (LOZOL) 2.5 MG tablet    verapamil (CALAN SR) 240 MG extended release tablet    Insomnia    Relevant Medications    zolpidem (AMBIEN) 5 MG tablet            We are going to start Zyrtec for the congestion to see if this will help. Continue amlodipine and losartan HCTZ for blood pressure control. Continue Lipitor for hyperlipidemia. Ambien was sent to the pharmacy for insomnia. Continue Xanax for anxiety. Follow-up with us in 3 months for checkup unless needed sooner. EMR Dragon/transcription disclaimer:  Much of this encounter note is electronic transcription/translation of spoken language toprinted texts. The electronic translation of spoken language may be erroneous, or at times, nonsensical words or phrases may be inadvertently transcribed.   Although I have reviewed the note for such errors, some may stillexist.

## 2021-11-29 DIAGNOSIS — F51.01 PRIMARY INSOMNIA: ICD-10-CM

## 2021-11-29 RX ORDER — ZOLPIDEM TARTRATE 10 MG/1
10 TABLET ORAL NIGHTLY PRN
Qty: 30 TABLET | Refills: 2 | Status: SHIPPED | OUTPATIENT
Start: 2021-11-29 | End: 2022-02-28

## 2021-12-06 ENCOUNTER — NURSE ONLY (OUTPATIENT)
Dept: PRIMARY CARE CLINIC | Age: 81
End: 2021-12-06
Payer: MEDICARE

## 2021-12-06 DIAGNOSIS — R35.0 URINARY FREQUENCY: Primary | ICD-10-CM

## 2021-12-06 DIAGNOSIS — R82.998 LEUKOCYTES IN URINE: ICD-10-CM

## 2021-12-06 LAB
APPEARANCE FLUID: ABNORMAL
BILIRUBIN, POC: ABNORMAL
BLOOD URINE, POC: ABNORMAL
CLARITY, POC: ABNORMAL
COLOR, POC: ABNORMAL
GLUCOSE URINE, POC: ABNORMAL
KETONES, POC: ABNORMAL
LEUKOCYTE EST, POC: ABNORMAL
NITRITE, POC: ABNORMAL
PH, POC: 5
PROTEIN, POC: ABNORMAL
SPECIFIC GRAVITY, POC: 1.03
UROBILINOGEN, POC: ABNORMAL

## 2021-12-06 PROCEDURE — 81002 URINALYSIS NONAUTO W/O SCOPE: CPT | Performed by: FAMILY MEDICINE

## 2021-12-06 PROCEDURE — 99211 OFF/OP EST MAY X REQ PHY/QHP: CPT | Performed by: FAMILY MEDICINE

## 2021-12-08 LAB — URINE CULTURE, ROUTINE: NORMAL

## 2021-12-16 RX ORDER — CETIRIZINE HYDROCHLORIDE 5 MG/1
5 TABLET ORAL DAILY
Qty: 90 TABLET | Refills: 3 | Status: SHIPPED | OUTPATIENT
Start: 2021-12-16 | End: 2022-04-27

## 2021-12-20 ENCOUNTER — TELEPHONE (OUTPATIENT)
Dept: PRIMARY CARE CLINIC | Age: 81
End: 2021-12-20

## 2021-12-20 NOTE — TELEPHONE ENCOUNTER
Her Pharmacy sent a letter stating that her Losartan HCTZ 100-25 in on back order and will need an alternative.

## 2021-12-20 NOTE — TELEPHONE ENCOUNTER
Can you find out if they can split it up and give it to her that way? I can send in a prescription if I have both of those medications in stock.

## 2021-12-21 ENCOUNTER — TELEPHONE (OUTPATIENT)
Dept: PRIMARY CARE CLINIC | Age: 81
End: 2021-12-21

## 2021-12-21 RX ORDER — HYDROCHLOROTHIAZIDE 25 MG/1
25 TABLET ORAL EVERY MORNING
Qty: 90 TABLET | Refills: 1 | Status: SHIPPED | OUTPATIENT
Start: 2021-12-21 | End: 2022-04-18

## 2021-12-21 RX ORDER — LOSARTAN POTASSIUM 100 MG/1
100 TABLET ORAL DAILY
Qty: 90 TABLET | Refills: 3 | Status: SHIPPED | OUTPATIENT
Start: 2021-12-21 | End: 2022-04-27

## 2022-01-06 DIAGNOSIS — F41.0 PANIC DISORDER (EPISODIC PAROXYSMAL ANXIETY): ICD-10-CM

## 2022-01-06 RX ORDER — ALPRAZOLAM 0.5 MG/1
0.5 TABLET ORAL 3 TIMES DAILY PRN
Qty: 90 TABLET | Refills: 0 | Status: SHIPPED | OUTPATIENT
Start: 2022-01-06 | End: 2022-02-08

## 2022-01-14 ENCOUNTER — TRANSCRIBE ORDERS (OUTPATIENT)
Dept: ADMINISTRATIVE | Facility: HOSPITAL | Age: 82
End: 2022-01-14

## 2022-01-14 DIAGNOSIS — M54.16 LUMBAR RADICULOPATHY: Primary | ICD-10-CM

## 2022-02-02 ENCOUNTER — TRANSCRIBE ORDERS (OUTPATIENT)
Dept: ADMINISTRATIVE | Facility: HOSPITAL | Age: 82
End: 2022-02-02

## 2022-02-02 ENCOUNTER — APPOINTMENT (OUTPATIENT)
Dept: MRI IMAGING | Facility: HOSPITAL | Age: 82
End: 2022-02-02

## 2022-02-02 ENCOUNTER — HOSPITAL ENCOUNTER (OUTPATIENT)
Dept: GENERAL RADIOLOGY | Facility: HOSPITAL | Age: 82
Discharge: HOME | End: 2022-02-02
Admitting: NURSE PRACTITIONER

## 2022-02-02 DIAGNOSIS — M19.012 ARTHROSIS OF SHOULDER, LEFT: ICD-10-CM

## 2022-02-02 DIAGNOSIS — M19.012 ARTHROSIS OF SHOULDER, LEFT: Primary | ICD-10-CM

## 2022-02-02 PROCEDURE — 73030 X-RAY EXAM OF SHOULDER: CPT

## 2022-02-08 DIAGNOSIS — F41.0 PANIC DISORDER (EPISODIC PAROXYSMAL ANXIETY): ICD-10-CM

## 2022-02-08 RX ORDER — ALPRAZOLAM 0.5 MG/1
0.5 TABLET ORAL 3 TIMES DAILY PRN
Qty: 90 TABLET | Refills: 0 | Status: SHIPPED | OUTPATIENT
Start: 2022-02-08 | End: 2022-03-07

## 2022-02-08 NOTE — TELEPHONE ENCOUNTER
Provider needs to review PDMP    PDMP Monitoring:    Last PDMP Chio Laurence as Reviewed [facility]):  Review User Review Instant Review Result          Urine Drug Screenings (1 yr)     POCT Rapid Drug Screen  Resulted: 11/13/2015 10:45 AM (Final result)    Complete Results          POCT Rapid Drug Screen  Collected: 8/11/2015 11:24 AM (Final result)    Complete Results              Medication Contract and Consent for Opioid Use Documents Filed     Patient Documents     Type of Document Status Date Received Received By Description    Medication Contract [Status Missing]       Medication Contract Signed 10/4/2017 11:56 AM Acacia Eaton

## 2022-02-27 DIAGNOSIS — F51.01 PRIMARY INSOMNIA: ICD-10-CM

## 2022-02-28 RX ORDER — ZOLPIDEM TARTRATE 10 MG/1
10 TABLET ORAL NIGHTLY PRN
Qty: 30 TABLET | Refills: 2 | Status: SHIPPED | OUTPATIENT
Start: 2022-02-28 | End: 2022-05-25

## 2022-02-28 NOTE — TELEPHONE ENCOUNTER
Provider needs to review PDMP    PDMP Monitoring:    Last PDMP Kiera Jones as Reviewed [facility]):  Review User Review Instant Review Result          Urine Drug Screenings (1 yr)     POCT Rapid Drug Screen  Resulted: 11/13/2015 10:45 AM (Final result)    Complete Results          POCT Rapid Drug Screen  Collected: 8/11/2015 11:24 AM (Final result)    Complete Results              Medication Contract and Consent for Opioid Use Documents Filed     Patient Documents     Type of Document Status Date Received Received By Description    Medication Contract [Status Missing]       Medication Contract Signed 10/4/2017 11:56 AM Arianna Oconnor

## 2022-03-07 DIAGNOSIS — F41.0 PANIC DISORDER (EPISODIC PAROXYSMAL ANXIETY): ICD-10-CM

## 2022-03-07 RX ORDER — ALPRAZOLAM 0.5 MG/1
0.5 TABLET ORAL 3 TIMES DAILY PRN
Qty: 90 TABLET | Refills: 0 | Status: SHIPPED | OUTPATIENT
Start: 2022-03-07 | End: 2022-06-01

## 2022-03-07 NOTE — TELEPHONE ENCOUNTER
PDMP Monitoring:    Last PDMP Eugenia Thao as Reviewed [facility]):  Review User Review Instant Review Result   CHRIS Tatum 2/28/2022  9:24 AM Reviewed PDMP [1]     Urine Drug Screenings (1 yr)     POCT Rapid Drug Screen  Resulted: 11/13/2015 10:45 AM (Final result)    Complete Results          POCT Rapid Drug Screen  Collected: 8/11/2015 11:24 AM (Final result)    Complete Results              Medication Contract and Consent for Opioid Use Documents Filed     Patient Documents     Type of Document Status Date Received Received By Description    Medication Contract [Status Missing]       Medication Contract Signed 10/4/2017 11:56 AM Lois Seo

## 2022-03-16 RX ORDER — INDAPAMIDE 2.5 MG/1
TABLET, FILM COATED ORAL
Qty: 180 TABLET | Refills: 3 | Status: SHIPPED | OUTPATIENT
Start: 2022-03-16

## 2022-04-04 RX ORDER — FLUTICASONE PROPIONATE 50 MCG
SPRAY, SUSPENSION (ML) NASAL
Qty: 1 EACH | Refills: 3 | Status: SHIPPED | OUTPATIENT
Start: 2022-04-04 | End: 2022-05-25

## 2022-04-18 RX ORDER — HYDROCHLOROTHIAZIDE 25 MG/1
TABLET ORAL
Qty: 90 TABLET | Refills: 1 | Status: SHIPPED | OUTPATIENT
Start: 2022-04-18

## 2022-04-26 ENCOUNTER — HOSPITAL ENCOUNTER (OUTPATIENT)
Dept: MRI IMAGING | Facility: HOSPITAL | Age: 82
Discharge: HOME | End: 2022-04-26
Admitting: NURSE PRACTITIONER

## 2022-04-26 DIAGNOSIS — M54.16 LUMBAR RADICULOPATHY: ICD-10-CM

## 2022-04-26 PROCEDURE — 72148 MRI LUMBAR SPINE W/O DYE: CPT

## 2022-04-27 ENCOUNTER — OFFICE VISIT (OUTPATIENT)
Dept: PRIMARY CARE CLINIC | Age: 82
End: 2022-04-27
Payer: MEDICARE

## 2022-04-27 VITALS
SYSTOLIC BLOOD PRESSURE: 134 MMHG | TEMPERATURE: 97.7 F | DIASTOLIC BLOOD PRESSURE: 76 MMHG | HEIGHT: 64 IN | HEART RATE: 95 BPM | RESPIRATION RATE: 20 BRPM | OXYGEN SATURATION: 95 % | WEIGHT: 173.2 LBS | BODY MASS INDEX: 29.57 KG/M2

## 2022-04-27 DIAGNOSIS — E78.5 HYPERLIPIDEMIA, UNSPECIFIED HYPERLIPIDEMIA TYPE: ICD-10-CM

## 2022-04-27 DIAGNOSIS — Z12.11 SCREENING FOR COLON CANCER: ICD-10-CM

## 2022-04-27 DIAGNOSIS — Z13.220 SCREENING FOR HYPERLIPIDEMIA: ICD-10-CM

## 2022-04-27 DIAGNOSIS — I10 ESSENTIAL HYPERTENSION: ICD-10-CM

## 2022-04-27 DIAGNOSIS — Z00.00 MEDICARE ANNUAL WELLNESS VISIT, SUBSEQUENT: Primary | ICD-10-CM

## 2022-04-27 PROCEDURE — G0439 PPPS, SUBSEQ VISIT: HCPCS | Performed by: FAMILY MEDICINE

## 2022-04-27 PROCEDURE — 1123F ACP DISCUSS/DSCN MKR DOCD: CPT | Performed by: FAMILY MEDICINE

## 2022-04-27 PROCEDURE — 4040F PNEUMOC VAC/ADMIN/RCVD: CPT | Performed by: FAMILY MEDICINE

## 2022-04-27 SDOH — ECONOMIC STABILITY: FOOD INSECURITY: WITHIN THE PAST 12 MONTHS, YOU WORRIED THAT YOUR FOOD WOULD RUN OUT BEFORE YOU GOT MONEY TO BUY MORE.: NEVER TRUE

## 2022-04-27 SDOH — ECONOMIC STABILITY: FOOD INSECURITY: WITHIN THE PAST 12 MONTHS, THE FOOD YOU BOUGHT JUST DIDN'T LAST AND YOU DIDN'T HAVE MONEY TO GET MORE.: NEVER TRUE

## 2022-04-27 ASSESSMENT — PATIENT HEALTH QUESTIONNAIRE - PHQ9
2. FEELING DOWN, DEPRESSED OR HOPELESS: 1
SUM OF ALL RESPONSES TO PHQ9 QUESTIONS 1 & 2: 2
1. LITTLE INTEREST OR PLEASURE IN DOING THINGS: 1
SUM OF ALL RESPONSES TO PHQ QUESTIONS 1-9: 2

## 2022-04-27 ASSESSMENT — SOCIAL DETERMINANTS OF HEALTH (SDOH): HOW HARD IS IT FOR YOU TO PAY FOR THE VERY BASICS LIKE FOOD, HOUSING, MEDICAL CARE, AND HEATING?: NOT HARD AT ALL

## 2022-04-27 ASSESSMENT — LIFESTYLE VARIABLES: HOW OFTEN DO YOU HAVE A DRINK CONTAINING ALCOHOL: NEVER

## 2022-04-27 NOTE — PATIENT INSTRUCTIONS
Personalized Preventive Plan for Morgan Perdomo - 4/27/2022  Medicare offers a range of preventive health benefits. Some of the tests and screenings are paid in full while other may be subject to a deductible, co-insurance, and/or copay. Some of these benefits include a comprehensive review of your medical history including lifestyle, illnesses that may run in your family, and various assessments and screenings as appropriate. After reviewing your medical record and screening and assessments performed today your provider may have ordered immunizations, labs, imaging, and/or referrals for you. A list of these orders (if applicable) as well as your Preventive Care list are included within your After Visit Summary for your review. Other Preventive Recommendations:    · A preventive eye exam performed by an eye specialist is recommended every 1-2 years to screen for glaucoma; cataracts, macular degeneration, and other eye disorders. · A preventive dental visit is recommended every 6 months. · Try to get at least 150 minutes of exercise per week or 10,000 steps per day on a pedometer . · Order or download the FREE \"Exercise & Physical Activity: Your Everyday Guide\" from [employer]. Call 5-999.373.5139 or search [employer] online. · You need 9442-2394 mg of calcium and 8610-5959 IU of vitamin D per day. It is possible to meet your calcium requirement with diet alone, but a vitamin D supplement is usually necessary to meet this goal.  · When exposed to the sun, use a sunscreen that protects against both UVA and UVB radiation with an SPF of 30 or greater. Reapply every 2 to 3 hours or after sweating, drying off with a towel, or swimming. · Always wear a seat belt when traveling in a car. Always wear a helmet when riding a bicycle or motorcycle. Heart-Healthy Diet   Sodium, Fat, and Cholesterol Controlled Diet       What Is a Heart Healthy Diet?    A heart-healthy diet is one that limits sodium , certain types of fat , and cholesterol . This type of diet is recommended for:   People with any form of cardiovascular disease (eg, coronary heart disease , peripheral vascular disease , previous heart attack , previous stroke )   People with risk factors for cardiovascular disease, such as high blood pressure , high cholesterol , or diabetes   Anyone who wants to lower their risk of developing cardiovascular disease   Sodium    Sodium is a mineral found in many foods. In general, most people consume much more sodium than they need. Diets high in sodium can increase blood pressure and lead to edema (water retention). On a heart-healthy diet, you should consume no more than 2,300 mg (milligrams) of sodium per dayabout the amount in one teaspoon of table salt. The foods highest in sodium include table salt (about 50% sodium), processed foods, convenience foods, and preserved foods. Cholesterol    Cholesterol is a fat-like, waxy substance in your blood. Our bodies make some cholesterol. It is also found in animal products, with the highest amounts in fatty meat, egg yolks, whole milk, cheese, shellfish, and organ meats. On a heart-healthy diet, you should limit your cholesterol intake to less than 200 mg per day. It is normal and important to have some cholesterol in your bloodstream. But too much cholesterol can cause plaque to build up within your arteries, which can eventually lead to a heart attack or stroke. The two types of cholesterol that are most commonly referred to are:   Low-density lipoprotein (LDL) cholesterol  Also known as bad cholesterol, this is the cholesterol that tends to build up along your arteries. Bad cholesterol levels are increased by eating fats that are saturated or hydrogenated. Optimal level of this cholesterol is less than 100. Over 130 starts to get risky for heart disease.    High-density lipoprotein (HDL) cholesterol  Also known as good cholesterol, this type of cholesterol actually carries cholesterol away from your arteries and may, therefore, help lower your risk of having a heart attack. You want this level to be high (ideally greater than 60). It is a risk to have a level less than 40. You can raise this good cholesterol by eating olive oil, canola oil, avocados, or nuts. Exercise raises this level, too. Fat    Fat is calorie dense and packs a lot of calories into a small amount of food. Even though fats should be limited due to their high calorie content, not all fats are bad. In fact, some fats are quite healthful. Fat can be broken down into four main types. The good-for-you fats are:   Monounsaturated fat  found in oils such as olive and canola, avocados, and nuts and natural nut butters; can decrease cholesterol levels, while keeping levels of HDL cholesterol high   Polyunsaturated fat  found in oils such as safflower, sunflower, soybean, corn, and sesame; can decrease total cholesterol and LDL cholesterol   Omega-3 fatty acids  particularly those found in fatty fish (such as salmon, trout, tuna, mackerel, herring, and sardines); can decrease risk of arrhythmias, decrease triglyceride levels, and slightly lower blood pressure   The fats that you want to limit are:   Saturated fat  found in animal products, many fast foods, and a few vegetables; increases total blood cholesterol, including LDL levels   Animal fats that are saturated include: butter, lard, whole-milk dairy products, meat fat, and poultry skin   Vegetable fats that are saturated include: hydrogenated shortening, palm oil, coconut oil, cocoa butter   Hydrogenated or trans fat  found in margarine and vegetable shortening, most shelf stable snack foods, and fried foods; increases LDL and decreases HDL     It is generally recommended that you limit your total fat for the day to less than 30% of your total calories.  If you follow an 1800-calorie heart healthy diet, for example, this would mean 60 grams of fat or less per day. Saturated fat and trans fat in your diet raises your blood cholesterol the most, much more than dietary cholesterol does. For this reason, on a heart-healthy diet, less than 7% of your calories should come from saturated fat and ideally 0% from trans fat. On an 1800-calorie diet, this translates into less than 14 grams of saturated fat per day, leaving 46 grams of fat to come from mono- and polyunsaturated fats.    Food Choices on a Heart Healthy Diet   Food Category   Foods Recommended   Foods to Avoid   Grains   Breads and rolls without salted tops Most dry and cooked cereals Unsalted crackers and breadsticks Low-sodium or homemade breadcrumbs or stuffing All rice and pastas   Breads, rolls, and crackers with salted tops High-fat baked goods (eg, muffins, donuts, pastries) Quick breads, self-rising flour, and biscuit mixes Regular bread crumbs Instant hot cereals Commercially prepared rice, pasta, or stuffing mixes   Vegetables   Most fresh, frozen, and low-sodium canned vegetables Low-sodium and salt-free vegetable juices Canned vegetables if unsalted or rinsed   Regular canned vegetables and juices, including sauerkraut and pickled vegetables Frozen vegetables with sauces Commercially prepared potato and vegetable mixes   Fruits   Most fresh, frozen, and canned fruits All fruit juices   Fruits processed with salt or sodium   Milk   Nonfat or low-fat (1%) milk Nonfat or low-fat yogurt Cottage cheese, low-fat ricotta, cheeses labeled as low-fat and low-sodium   Whole milk Reduced-fat (2%) milk Malted and chocolate milk Full fat yogurt Most cheeses (unless low-fat and low salt) Buttermilk (no more than 1 cup per week)   Meats and Beans   Lean cuts of fresh or frozen beef, veal, lamb, or pork (look for the word loin) Fresh or frozen poultry without the skin Fresh or frozen fish and some shellfish Egg whites and egg substitutes (Limit whole eggs to three per week) Tofu Nuts or seeds (unsalted, dry-roasted), low-sodium peanut butter Dried peas, beans, and lentils   Any smoked, cured, salted, or canned meat, fish, or poultry (including parnell, chipped beef, cold cuts, hot dogs, sausages, sardines, and anchovies) Poultry skins Breaded and/or fried fish or meats Canned peas, beans, and lentils Salted nuts   Fats and Oils   Olive oil and canola oil Low-sodium, low-fat salad dressings and mayonnaise   Butter, margarine, coconut and palm oils, parnell fat   Snacks, Sweets, and Condiments   Low-sodium or unsalted versions of broths, soups, soy sauce, and condiments Pepper, herbs, and spices; vinegar, lemon, or lime juice Low-fat frozen desserts (yogurt, sherbet, fruit bars) Sugar, cocoa powder, honey, syrup, jam, and preserves Low-fat, trans-fat free cookies, cakes, and pies Kishan and animal crackers, fig bars, lorenzo snaps   High-fat desserts Broth, soups, gravies, and sauces, made from instant mixes or other high-sodium ingredients Salted snack foods Canned olives Meat tenderizers, seasoning salt, and most flavored vinegars   Beverages   Low-sodium carbonated beverages Tea and coffee in moderation Soy milk   Commercially softened water   Suggestions   Make whole grains, fruits, and vegetables the base of your diet. Choose heart-healthy fats such as canola, olive, and flaxseed oil, and foods high in heart-healthy fats, such as nuts, seeds, soybeans, tofu, and fish. Eat fish at least twice per week; the fish highest in omega-3 fatty acids and lowest in mercury include salmon, herring, mackerel, sardines, and canned chunk light tuna. If you eat fish less than twice per week or have high triglycerides, talk to your doctor about taking fish oil supplements. Read food labels.    For products low in fat and cholesterol, look for fat free, low-fat, cholesterol free, saturated fat free, and trans fat freeAlso scan the Nutrition Facts Label, which lists saturated fat, trans fat, and cholesterol amounts. For products low in sodium, look for sodium free, very low sodium, low sodium, no added salt, and unsalted   Skip the salt when cooking or at the table; if food needs more flavor, get creative and try out different herbs and spices. Garlic and onion also add substantial flavor to foods. Trim any visible fat off meat and poultry before cooking, and drain the fat off after baltazar. Use cooking methods that require little or no added fat, such as grilling, boiling, baking, poaching, broiling, roasting, steaming, stir-frying, and sauting. Avoid fast food and convenience food. They tend to be high in saturated and trans fat and have a lot of added salt. Talk to a registered dietitian for individualized diet advice. Last Reviewed: March 2011 Gen Mercado MS, MPH, RD   Updated: 3/29/2011   ·     Heart-Healthy Diet   Sodium, Fat, and Cholesterol Controlled Diet       What Is a Heart Healthy Diet? A heart-healthy diet is one that limits sodium , certain types of fat , and cholesterol . This type of diet is recommended for:   People with any form of cardiovascular disease (eg, coronary heart disease , peripheral vascular disease , previous heart attack , previous stroke )   People with risk factors for cardiovascular disease, such as high blood pressure , high cholesterol , or diabetes   Anyone who wants to lower their risk of developing cardiovascular disease   Sodium    Sodium is a mineral found in many foods. In general, most people consume much more sodium than they need. Diets high in sodium can increase blood pressure and lead to edema (water retention). On a heart-healthy diet, you should consume no more than 2,300 mg (milligrams) of sodium per dayabout the amount in one teaspoon of table salt. The foods highest in sodium include table salt (about 50% sodium), processed foods, convenience foods, and preserved foods.    Cholesterol    Cholesterol is a fat-like, waxy substance in your blood. Our bodies make some cholesterol. It is also found in animal products, with the highest amounts in fatty meat, egg yolks, whole milk, cheese, shellfish, and organ meats. On a heart-healthy diet, you should limit your cholesterol intake to less than 200 mg per day. It is normal and important to have some cholesterol in your bloodstream. But too much cholesterol can cause plaque to build up within your arteries, which can eventually lead to a heart attack or stroke. The two types of cholesterol that are most commonly referred to are:   Low-density lipoprotein (LDL) cholesterol  Also known as bad cholesterol, this is the cholesterol that tends to build up along your arteries. Bad cholesterol levels are increased by eating fats that are saturated or hydrogenated. Optimal level of this cholesterol is less than 100. Over 130 starts to get risky for heart disease. High-density lipoprotein (HDL) cholesterol  Also known as good cholesterol, this type of cholesterol actually carries cholesterol away from your arteries and may, therefore, help lower your risk of having a heart attack. You want this level to be high (ideally greater than 60). It is a risk to have a level less than 40. You can raise this good cholesterol by eating olive oil, canola oil, avocados, or nuts. Exercise raises this level, too. Fat    Fat is calorie dense and packs a lot of calories into a small amount of food. Even though fats should be limited due to their high calorie content, not all fats are bad. In fact, some fats are quite healthful. Fat can be broken down into four main types.    The good-for-you fats are:   Monounsaturated fat  found in oils such as olive and canola, avocados, and nuts and natural nut butters; can decrease cholesterol levels, while keeping levels of HDL cholesterol high   Polyunsaturated fat  found in oils such as safflower, sunflower, soybean, corn, and sesame; can decrease total cholesterol and LDL cholesterol   Omega-3 fatty acids  particularly those found in fatty fish (such as salmon, trout, tuna, mackerel, herring, and sardines); can decrease risk of arrhythmias, decrease triglyceride levels, and slightly lower blood pressure   The fats that you want to limit are:   Saturated fat  found in animal products, many fast foods, and a few vegetables; increases total blood cholesterol, including LDL levels   Animal fats that are saturated include: butter, lard, whole-milk dairy products, meat fat, and poultry skin   Vegetable fats that are saturated include: hydrogenated shortening, palm oil, coconut oil, cocoa butter   Hydrogenated or trans fat  found in margarine and vegetable shortening, most shelf stable snack foods, and fried foods; increases LDL and decreases HDL     It is generally recommended that you limit your total fat for the day to less than 30% of your total calories. If you follow an 1800-calorie heart healthy diet, for example, this would mean 60 grams of fat or less per day. Saturated fat and trans fat in your diet raises your blood cholesterol the most, much more than dietary cholesterol does. For this reason, on a heart-healthy diet, less than 7% of your calories should come from saturated fat and ideally 0% from trans fat. On an 1800-calorie diet, this translates into less than 14 grams of saturated fat per day, leaving 46 grams of fat to come from mono- and polyunsaturated fats.    Food Choices on a Heart Healthy Diet   Food Category   Foods Recommended   Foods to Avoid   Grains   Breads and rolls without salted tops Most dry and cooked cereals Unsalted crackers and breadsticks Low-sodium or homemade breadcrumbs or stuffing All rice and pastas   Breads, rolls, and crackers with salted tops High-fat baked goods (eg, muffins, donuts, pastries) Quick breads, self-rising flour, and biscuit mixes Regular bread crumbs Instant hot cereals Commercially prepared rice, pasta, or stuffing mixes Salted snack foods Canned olives Meat tenderizers, seasoning salt, and most flavored vinegars   Beverages   Low-sodium carbonated beverages Tea and coffee in moderation Soy milk   Commercially softened water   Suggestions   Make whole grains, fruits, and vegetables the base of your diet. Choose heart-healthy fats such as canola, olive, and flaxseed oil, and foods high in heart-healthy fats, such as nuts, seeds, soybeans, tofu, and fish. Eat fish at least twice per week; the fish highest in omega-3 fatty acids and lowest in mercury include salmon, herring, mackerel, sardines, and canned chunk light tuna. If you eat fish less than twice per week or have high triglycerides, talk to your doctor about taking fish oil supplements. Read food labels. For products low in fat and cholesterol, look for fat free, low-fat, cholesterol free, saturated fat free, and trans fat freeAlso scan the Nutrition Facts Label, which lists saturated fat, trans fat, and cholesterol amounts. For products low in sodium, look for sodium free, very low sodium, low sodium, no added salt, and unsalted   Skip the salt when cooking or at the table; if food needs more flavor, get creative and try out different herbs and spices. Garlic and onion also add substantial flavor to foods. Trim any visible fat off meat and poultry before cooking, and drain the fat off after baltazar. Use cooking methods that require little or no added fat, such as grilling, boiling, baking, poaching, broiling, roasting, steaming, stir-frying, and sauting. Avoid fast food and convenience food. They tend to be high in saturated and trans fat and have a lot of added salt. Talk to a registered dietitian for individualized diet advice.       Last Reviewed: March 2011 Selena Estrella MS, MPH, RD   Updated: 3/29/2011   ·     Preventing Osteoporosis: After Your Visit  Your Care Instructions  Osteoporosis means the bones are weak and thin enough that they can break easily. The older you are, the more likely you are to get osteoporosis. But with plenty of calcium, vitamin D, and exercise, you can help prevent osteoporosis. The preteen and teen years are a key time for bone building. With the help of calcium, vitamin D, and exercise in those early years and beyond, the bones reach their peak density and strength by age 27. After age 27, your bones naturally start to thin and weaken. The stronger your bones are at around age 27, the lower your risk for osteoporosis. But no matter what your age and risk are, your bones still need calcium, vitamin D, and exercise to stay strong. Also avoid smoking, and limit alcohol. Smoking and heavy alcohol use can make your bones thinner. Talk to your doctor about any special risks you might have, such as having a close relative with osteoporosis or taking a medicine that can weaken bones. Your doctor can tell you the best ways to protect your bones from thinning. Follow-up care is a key part of your treatment and safety. Be sure to make and go to all appointments, and call your doctor if you are having problems. It's also a good idea to know your test results and keep a list of the medicines you take. How can you care for yourself at home? Get enough calcium and vitamin D. The [employer] recommends adults younger than age 46 need 1,000 mg of calcium and 600 IU of vitamin D each day. Women ages 46 to 79 need 1,200 mg of calcium and 600 IU of vitamin D each day. Men ages 46 to 79 need 1,000 mg of calcium and 600 IU of vitamin D each day. Adults 71 and older need 1,200 mg of calcium and 800 IU of vitamin D each day. Eat foods rich in calcium, like yogurt, cheese, milk, and dark green vegetables. Eat foods rich in vitamin D, like eggs, fatty fish, cereal, and fortified milk. Get some sunshine. Your body uses sunshine to make its own vitamin D. The safest time to be out in the sun is before 10 a.m. or after 3 p.m.  Avoid getting sunburned. Sunburn can increase your risk of skin cancer. Talk to your doctor about taking a calcium plus vitamin D supplement. Ask about what type of calcium is right for you, and how much to take at a time. Adults ages 23 to 48 should not get more than 2,500 mg of calcium and 4,000 IU of vitamin D each day, whether it is from supplements and/or food. Adults ages 46 and older should not get more than 2,000 mg of calcium and 4,000 IU of vitamin D each day from supplements and/or food. Get regular bone-building exercise. Weight-bearing and resistance exercises keep bones healthy by working the muscles and bones against gravity. Start out at an exercise level that feels right for you. Add a little at a time until you can do the following:  Do 30 minutes of weight-bearing exercise on most days of the week. Walking, jogging, stair climbing, and dancing are good choices. Do resistance exercises with weights or elastic bands 2 to 3 days a week. Limit alcohol. Drink no more than 1 alcohol drink a day if you are a woman. Drink no more than 2 alcohol drinks a day if you are a man. Do not smoke. Smoking can make bones thin faster. If you need help quitting, talk to your doctor about stop-smoking programs and medicines. These can increase your chances of quitting for good. When should you call for help? Watch closely for changes in your health, and be sure to contact your doctor if:  You need help with a healthy eating plan. You need help with an exercise plan    © 4203-7975 [facility] [employer], [facility]. Care instructions adapted under license by [facility]. This care instruction is for use with your licensed healthcare professional. If you have questions about a medical condition or this instruction, always ask your healthcare professional. [place] [address] any warranty or liability for your use of this information. Content Version: 9.4.77116;  Last Revised: June 20, 2011              ·     DASH Diet: After Your Visit  Your Care Instructions  The DASH diet is an eating plan that can help lower your blood pressure. DASH stands for Dietary Approaches to Stop Hypertension. Hypertension is high blood pressure. The DASH diet focuses on eating foods that are high in calcium, potassium, and magnesium. These nutrients can lower blood pressure. The foods that are highest in these nutrients are fruits, vegetables, low-fat dairy products, nuts, seeds, and legumes. But taking calcium, potassium, and magnesium supplements instead of eating foods that are high in those nutrients does not have the same effect. The DASH diet also includes whole grains, fish, and poultry. The DASH diet is one of several lifestyle changes your doctor may recommend to lower your high blood pressure. Your doctor may also want you to decrease the amount of sodium in your diet. Lowering sodium while following the DASH diet can lower blood pressure even further than just the DASH diet alone. Follow-up care is a key part of your treatment and safety. Be sure to make and go to all appointments, and call your doctor if you are having problems. Its also a good idea to know your test results and keep a list of the medicines you take. How can you care for yourself at home? Following the DASH diet  Eat 4 to 5 servings of fruit each day. A serving is 1 medium-sized piece of fruit, ½ cup chopped or canned fruit, 1/4 cup dried fruit, or 4 ounces (½ cup) of fruit juice. Choose fruit more often than fruit juice. Eat 4 to 5 servings of vegetables each day. A serving is 1 cup of lettuce or raw leafy vegetables, ½ cup of chopped or cooked vegetables, or 4 ounces (½ cup) of vegetable juice. Choose vegetables more often than vegetable juice. Get 2 to 3 servings of low-fat and fat-free dairy each day. A serving is 8 ounces of milk, 1 cup of yogurt, or 1 ½ ounces of cheese. Eat 7 to 8 servings of grains each day.  A serving is 1 slice of bread, 1 ounce of dry cereal, or ½ cup of cooked rice, pasta, or cooked cereal. Try to choose whole-grain products as much as possible. Limit lean meat, poultry, and fish to 6 ounces each day. Six ounces is about the size of two decks of cards. Eat 4 to 5 servings of nuts, seeds, and legumes (cooked dried beans, lentils, and split peas) each week. A serving is 1/3 cup of nuts, 2 tablespoons of seeds, or ½ cup cooked dried beans or peas. Limit sweets and added sugars to 5 servings or less a week. A serving is 1 tablespoon jelly or jam, ½ cup sorbet, or 1 cup of lemonade. Tips for success  Start small. Do not try to make dramatic changes to your diet all at once. You might feel that you are missing out on your favorite foods and then be more likely to not follow the plan. Make small changes, and stick with them. Once those changes become habit, add a few more changes. Try some of the following:  Make it a goal to eat a fruit or vegetable at every meal and at snacks. This will make it easy to get the recommended amount of fruits and vegetables each day. Try yogurt topped with fruit and nuts for a snack or healthy dessert. Add lettuce, tomato, cucumber, and onion to sandwiches. Combine a ready-made pizza crust with low-fat mozzarella cheese and lots of vegetable toppings. Try using tomatoes, squash, spinach, broccoli, carrots, cauliflower, and onions. Have a variety of cut-up vegetables with a low-fat dip as an appetizer instead of chips and dip. Sprinkle sunflower seeds or chopped almonds over salads. Or try adding chopped walnuts or almonds to cooked vegetables. Try some vegetarian meals using beans and peas. Add garbanzo or kidney beans to salads. Make burritos and tacos with mashed fontaine beans or black beans    © 9918-5142 Healthwise, Incorporated. Care instructions adapted under license by [facility].  This care instruction is for use with your licensed healthcare professional. If you have questions about a medical condition or this instruction, always ask your healthcare professional. [place] [address] any warranty or liability for your use of this information. Content Version: 9.4.45442; Last Revised: March 15, 2012              ·     Keep Your Memory [place]       Many factors can affect your ability to remembera hectic lifestyle, aging, stress, chronic disease, and certain medicines. But, there are steps you can take to sharpen your mind and help preserve your memory. Challenge Your Brain   Regularly challenging your mind may help keeps it in top shape. Good mental exercises include:   Crossword puzzlesUse a dictionary if you need it; you will learn more that way. Brainteasers Try some! Crafts, such as wood working and sewing   Hobbies, such as gardening and building model airplanes   SocializingVisit old friends or join groups to meet new ones. Reading   Learning a new language   Taking a class, whether it be art history or agapito chi   TravelingExperience the food, history, and culture of your destination   Learning to use a computer   Going to museums, the theater, or thought-provoking movies   Changing things in your daily life, such as reversing your pattern in the grocery store or brushing your teeth using your nondominant hand   Use Memory Aids   There is no need to remember every detail on your own. These memory aids can help:   Calendars and day planners   Electronic organizers to store all sorts of helpful informationThese devices can \"beep\" to remind you of appointments. A book of days to record birthdays, anniversaries, and other occasions that occur on the same date every year   Detailed \"to-do\" lists and strategically placed sticky notes   Quick \"study\" sessionsBefore a gathering, review who will be there so their names will be fresh in your mind.    Establish routinesFor example, keep your keys, wallet, and umbrella in the same place all the time or take medicine with your 8:00 AM glass of juice   Live a Healthy Life   Many actions that will keep your body strong will do the same for your mind. For example:   Talk to Your Doctor About Herbs and Supplements    Malnutrition and vitamin deficiencies can impair your mental function. For example, vitamin B12 deficiency can cause a range of symptoms, including confusion. But, what if your nutritional needs are being met? Can herbs and supplements still offer a benefit? Researchers have investigated a range of natural remedies, such as ginkgo , ginseng , and the supplement phosphatidylserine (PS). So far, though, the evidence is inconsistent as to whether these products can improve memory or thinking. If you are interested in taking herbs and supplements, talk to your doctor first because they may interact with other medicines that you are taking. Exercise Regularly    Among the many benefits of regular exercise are increased blood flow to the brain and decreased risk of certain diseases that can interfere with memory function. One study found that even moderate exercise has a beneficial effect. Examples of \"moderate\" exercise include:   Playing 18 holes of golf once a week, without a cart   Playing tennis twice a week   Walking one mile per day   Manage Stress    It can be tough to remember what is important when your mind is cluttered. Make time for relaxation. Choose activities that calm you down, and make it routine. Manage Chronic Conditions    Side effects of high blood pressure , diabetes, and heart disease can interfere with mental function. Many of the lifestyle steps discussed here can help manage these conditions. Strive to eat a healthy diet, exercise regularly, get stress under control, and follow your doctor's advice for your condition. Minimize Medications    Talk to your doctor about the medicines that you take. Some may be unnecessary.  Also, healthy lifestyle habits may lower the need for certain drugs. Last Reviewed: April 2010 Victorino Zaragoza MD   Updated: 4/13/2010   ·     [address] [facility]       As we get older, changes in balance, gait, strength, vision, hearing, and cognition make even the most youthful senior more prone to accidents. Falls are one of the leading health risks for older people. This increased risk of falling is related to:   Aging process (eg, decreased muscle strength, slowed reflexes)   Higher incidence of chronic health problems (eg, arthritis, diabetes) that may limit mobility, agility or sensory awareness   Side effects of medicine (eg, dizziness, blurred vision)especially medicines like prescription pain medicines and drugs used to treat mental health conditions   Depending on the brittleness of your bones, the consequences of a fall can be serious and long lasting. Home Life   Research by the Association of Aging [facility]) shows that some home accidents among older adults can be prevented by making simple lifestyle changes and basic modifications and repairs to the home environment. Here are some lifestyle changes that experts recommend:   Have your hearing and vision checked regularly. Be sure to wear prescription glasses that are right for you. Speak to your doctor or pharmacist about the possible side effects of your medicines. A number of medicines can cause dizziness. If you have problems with sleep, talk to your doctor. Limit your intake of alcohol. If necessary, use a cane or walker to help maintain your balance. Wear supportive, rubber-soled shoes, even at home. If you live in a region that gets wintry weather, you may want to put special cleats on your shoes to prevent you from slipping on the snow and ice. Exercise regularly to help maintain muscle tone, agility, and balance. Always hold the banister when going up or down stairs. Also, use  bars when getting in or out of the bath or shower, or using the toilet.    To avoid dizziness, get up slowly from a lying down position. Sit up first, dangling your legs for a minute or two before rising to a standing position. Overall Home Safety Check   According to the Consumer Product Safety Commision's \"Older Consumer Home Safety Checklist,\" it is important to check for potential hazards in each room. And remember, proper lighting is an essential factor in home safety. If you cannot see clearly, you are more likely to fall. Important questions to ask yourself include:   Are lamp, electric, extension, and telephone cords placed out of the flow of traffic and maintained in good condition? Have frayed cords been replaced? Are all small rugs and runners slip resistant? If not, you can secure them to the floor with a special double-sided carpet tape. Are smoke detectors properly locatedone on every floor of your home and one outside of every sleeping area? Are they in good working order? Are batteries replaced at least once a year? Do you have a well-maintained carbon monoxide detector outside every sleeping are in your home? Does your furniture layout leave plenty of space to maneuver between and around chairs, tables, beds, and sofas? Are hallways, stairs and passages between rooms well lit? Can you reach a lamp without getting out of bed? Are floor surfaces well maintained? Shag rugs, high-pile carpeting, tile floors, and polished wood floors can be particularly slippery. Stairs should always have handrails and be carpeted or fitted with a non-skid tread. Is your telephone easily reachable. Is the cord safely tucked away? Room by Room   According to the Association of Aging, bathrooms and kami are the two most potentially hazardous rooms in your home. In the Kitchen    Be sure your stove is in proper working order and always make sure burners and the oven are off before you go out or go to sleep.     Keep pots on the back burners, turn handles away from the front of the stove, and keep stove clean and free of grease build-up. Kitchen ventilation systems and range exhausts should be working properly. Keep flammable objects such as towels and pot holders away from the cooking area except when in use. Make sure kitchen curtains are tied back. Move cords and appliances away from the sink and hot surfaces. If extension cords are needed, install wiring guides so they do not hang over the sink, range, or working areas. Look for coffee pots, kettles and toaster ovens with automatic shut-offs. Keep a mop handy in the kitchen so you can wipe up spills instantly. You should also have a small fire extinguisher. Arrange your kitchen with frequently used items on lower shelves to avoid the need to stand on a stepstool to reach them. Make sure countertops are well-lit to avoid injuries while cutting and preparing food. In the Bathroom    Use a non-slip mat or decals in the tub and shower, since wet, soapy tile or porcelain surfaces are extremely slippery. Make sure bathroom rugs are non-skid or tape them firmly to the floor. Bathtubs should have at least one, preferably two, grab bars, firmly attached to structural supports in the wall. (Do not use built-in soap holders or glass shower doors as grab bars.)    Tub seats fitted with non-slip material on the legs allow you to wash sitting down. For people with limited mobility, bathtub transfer benches allow you to slide safely into the tub. Raised toilet seats and toilet safety rails are helpful for those with knee or hip problems. In the [facility]    Make sure you use a nightlight and that the area around your bed is clear of potential obstacles. Be careful with electric blankets and never go to sleep with a heating pad, which can cause serious burns even if on a low setting. Use fire-resistant mattress covers and pillows, and NEVER smoke in bed.     Keep a phone next to the bed that is programmed to dial 911 at the push are.  Talk to your doctor about how long you are likely to live. Your doctor may be able to give you an idea of what usually happens with your specific illness. Think about preparing papers that state your wishes. These papers are called advance directives. If you do this early and review them often, there will not be any confusion about what you want. You can change your instructions at any time. Which papers should you prepare? Advance directives are legal papers that tell doctors how you want to be cared for at the end of your life. You do not need a  to write these papers. Ask your doctor or your state health department for information on how to write your advance directives. They may have the forms for each of these types of papers. Make sure your doctor has a copy of these on file, and give a copy to afamily member or close friend. Consider a do-not-resuscitate order (DNR). This order asks that no extra treatments be done if your heart stops or you stop breathing. Extra treatments may include cardiopulmonary resuscitation (CPR), electrical shock to restart your heart, or a machine to breathe for you. If you decide to have a DNR order, ask your doctor to explain and write it. Place the order in your home where everyone can easily see it. Consider a living will. A living will explains your wishes about life support and other treatments at the end of your life if you become unable to speak for yourself. Living priest tell doctors to use or not use treatments that would keep you alive. You must have one or two witnesses or a notary present when you sign this form. A living will may be called something else in your state. Consider a medical power of . This form allows you to name a person to make decisions about your care if you are not able to. Most people ask a close friend or family member. Talk to this person about the kinds of treatments you want and those that you do not want.  Make sure this person understands your wishes. A medical power of  may be called something else in your state. These legal papers are simple to change. Tell your doctor what you want to change, and ask him or her to make a note in your medical file. Give yourfamily updated copies of the papers. Where can you learn more? Go to https://chpepiceweb.Recognition PRO. org and sign in to your [employer] account. Enter P184 in the GetNotes box to learn more about \"Advance Care Planning: Care Instructions. \"     If you do not have an account, please click on the \"Sign Up Now\" link. Current as of: October 18, 2021               Content Version: 13.2  © 2006-2022 Healthwise, [employer]. Care instructions adapted under license by [facility] ([facility]). If you have questions about a medical condition or this instruction, always ask your healthcare professional. [place] [address] any warranty or liability for your use of this information. ·        Learning About [place]  What is a living will? A living will, also called a declaration, is a legal form. It tells your family and your doctor your wishes when you can't speak for yourself. It's used by the health professionals who will treat you as you near the end of your life or ifyou get seriously hurt or ill. If you put your wishes in writing, your loved ones and others will know what kind of care you want. They won't need to guess. This can ease your mind and behelpful to others. And you can change or cancel your living will at any time. A living will is not the same as an estate or property will. An estate willexplains what you want to happen with your money and property after you die. How do you use it? Keep these facts in mind about how a living will is used. Your living will is used only if you can't speak or make decisions for yourself.  Most often, one or more doctors must certify that you can't speak or decide for yourself before your living will takes effect. If you get better and can speak for yourself again, you can accept or refuse any treatment. It doesn't matter what you said in your living will. Some states may limit your right to refuse treatment in certain cases. For example, you may need to clearly state in your living will that you don't want artificial hydration and nutrition, such as being fed through a tube. Is a living will a legal document? A living will is a legal document. Each state has its own laws about livingwills. And a living will may be called something else in your state. Here are some things to know about living priest. You don't need an  to complete a living will. But legal advice can be helpful if your state's laws are unclear. It can also help if your health history is complicated or your family can't agree on what should be in your living will. You can change your living will at any time. Some people find that their wishes about end-of-life care change as their health changes. If you make big changes to your living will, complete a new form. If you move to another state, make sure that your living will is legal in the state where you now live. In most cases, doctors will respect your wishes even if you have a form from a different state. You might use a universal form that has been approved by many states. This kind of form can sometimes be filled out and stored online. Your digital copy will then be available wherever you have a connection to the internet. The doctors and nurses who need to treat you can find it right away. Your state may offer an online registry. This is another place where you can store your living will online. It's a good idea to get your living will notarized. This means using a person called a  to watch two people sign, or witness, your living will. What should you know when you create a living will?   Here are some questions to ask yourself as you make your living will. Do you know enough about life support methods that might be used? If not, talk to your doctor so you know what might be done if you can't breathe on your own, your heart stops, or you can't swallow. What things would you still want to be able to do after you receive life-support methods? Would you want to be able to walk? To speak? To eat on your own? To live without the help of machines? Do you want certain Mandaeism practices performed if you become very ill? If you have a choice, where do you want to be cared for? In your home? At a hospital or nursing home? If you have a choice at the end of your life, where would you prefer to die? At home? In a hospital or nursing home? Somewhere else? Would you prefer to be buried or cremated? Do you want your organs to be donated after you die? What should you do with your living will? Make sure that your family members and your health care agent have copies of your living will (also called a declaration). Give your doctor a copy of your living will. Ask to have it kept as part of your medical record. If you have more than one doctor, make sure that each one has a copy. Put a copy of your living will where it can be easily found. For example, some people may put a copy on their refrigerator door. If you are using a digital copy, be sure your doctor, family members, and health care agent know how to find and access it. Where can you learn more? Go to https://vLinepepicewkenna.GroupFlier. org and sign in to your [employer] account. Enter E882 in the [facility] box to learn more about \"Learning About Living Mark Hdz. \"     If you do not have an account, please click on the \"Sign Up Now\" link. Current as of: October 18, 2021               Content Version: 13.2  © 5324-0873 Healthwise, Incorporated. Care instructions adapted under license by [facility] ([facility]).  If you have questions about a medical condition or this instruction, always ask your healthcare professional. [address] any warranty or liability for your use of this information. ·        Learning About Medical Power of   What is a medical power of ? A medical power of , also called a durable power of  for health care, is one type of the legal forms called advance directives. It lets you name the person you want to make treatment decisions for you if you can't speak or decide for yourself. The person you choose is called your health care agent. This person is also called a health care proxy or health care surrogate. A medical power of  may be called something else in your state. How do you choose a health care agent? Choose your health care agent carefully. This person may or may not be a familymember. Talk to the person before you make your final decision. Make sure he or she iscomfortable with this responsibility. It's a good idea to choose someone who:  Is at least 25years old. Knows you well and understands what makes life meaningful for you. Understands your Yarsanism and moral values. Will do what you want, not what he or she wants. Will be able to make difficult choices at a stressful time. Will be able to refuse or stop treatment, if that is what you would want, even if you could die. Will be firm and confident with health professionals if needed. Will ask questions to get needed information. Lives near you or agrees to travel to you if needed. Your family may help you make medical decisions while you can still be part of that process. But it's important to choose one person to be your health careagent in case you aren't able to make decisions for yourself. If you don't fill out the legal form and name a health care agent, thedecisions your family can make may be limited. A health care agent may be called something else in your state.   Who will make decisions for you if you don't have a health care agent? If you don't have a health care agent or a living will, you may not get the care you want. Decisions may be made by family members who disagree about your medical care. Or decisions may be made by a medical professional who doesn'tknow you well. In some cases, a  makes the decisions. When you name a health care agent, it is very clear who has the power to [place] decisions for you. How do you name a health care agent? You name your health care agent on a legal form. This form is usually called a medical power of . Ask your hospital, state bar association, or officeon aging where to find these forms. You must sign the form to make it legal. Some states require you to get the form notarized. This means that a person called a  watches you sign the form and then he or she signs the form. Some states also require thattwo or more witnesses sign the form. Be sure to tell your family members and doctors who your health care agent is. Where can you learn more? Go to https://Nextivapepeytoneb.Meshfire. org and sign in to your [employer] account. Enter [id number] in the zumatek box to learn more about \"Learning About [address]. \"     If you do not have an account, please click on the \"Sign Up Now\" link. Current as of: October 18, 2021               Content Version: 13.2  © 2006-2022 Healthwise, Incorporated. Care instructions adapted under license by [facility]. If you have questions about a medical condition or this instruction, always ask your healthcare professional. [place] [address] any warranty or liability for your use of this information.     ·

## 2022-04-27 NOTE — PROGRESS NOTES
Medicare Annual Wellness Visit    Mai Celis is here for Medicare AWV    Assessment & Plan   Medicare annual wellness visit, subsequent  Screening for colon cancer  -     FECAL DNA COLORECTAL CANCER SCREENING (COLOGUARD); Future  Screening for hyperlipidemia  -     Lipid Panel; Future      Recommendations for Preventive Services Due: see orders and patient instructions/AVS.  Recommended screening schedule for the next 5-10 years is provided to the patient in written form: see Patient Instructions/AVS.     No follow-ups on file. Subjective   Mar Lizbet is doing good. She has her daughter's living with her. She enjoys their company and their help. She uses a cane and walks well with that. She does watch her footing so not to fall. She had a fall within this last year where she \"fractured ribs and punctured a lung\" , but it corrected itself. Patient's complete Health Risk Assessment and screening values have been reviewed and are found in Flowsheets. The following problems were reviewed today and where indicated follow up appointments were made and/or referrals ordered.     Positive Risk Factor Screenings with Interventions:    Fall Risk:  Do you feel unsteady or are you worried about falling? : (!) yes  2 or more falls in past year?: (!) yes  Fall with injury in past year?: (!) yes     Fall Risk Interventions:    · Home safety tips provided  · Home exercises provided to promote strength and balance  · Patient declines any further evaluation/treatment for this issue            General Health and ACP:  General  In general, how would you say your health is?: Good  In the past 7 days, have you experienced any of the following: New or Increased Pain, New or Increased Fatigue, Loneliness, Social Isolation, Stress or Anger?: No  Do you get the social and emotional support that you need?: Yes  Do you have a Living Will?: Yes    Advance Directives     Power of  Living Will ACP-Advance Directive ACP-Power of     Not on File Not on File Not on File Not on File      General Health Risk Interventions:  · Pt will bring a copy of the Living Will for the chart at her next visit. Hearing/Vision:  Do you or your family notice any trouble with your hearing that hasn't been managed with hearing aids?: No  Do you have difficulty driving, watching TV, or doing any of your daily activities because of your eyesight?: No  Have you had an eye exam within the past year?: (!) No  No exam data present    Hearing/Vision Interventions:  · Vision concerns:  patient encouraged to make appointment with his/her eye specialist     ADLs:  In the past 7 days, did you need help from others to perform any of the following everyday activities: Eating, dressing, grooming, bathing, toileting, or walking/balance?: (!) Yes  Select all that apply: (!) Walking/Balance  In the past 7 days, did you need help from others to take care of any of the following: Laundry, housekeeping, banking/finances, shopping, telephone use, food preparation, transportation, or taking medications?: (!) Yes  Select all that apply: (!) Housekeeping,Telephone Use,Shopping,Food Preparation    ADL Interventions:  · Patient declines any further evaluation/treatment for this issue          Objective   Vitals:    04/27/22 0943   BP: 134/76   Site: Left Upper Arm   Position: Sitting   Cuff Size: Medium Adult   Pulse: 95   Resp: 20   Temp: 97.7 °F (36.5 °C)   TempSrc: Temporal   SpO2: 95%   Weight: 173 lb 3.2 oz (78.6 kg)   Height: 5' 4\" (1.626 m)      Body mass index is 29.73 kg/m². Recent and Remote memory are intact. She was slow to recall the words, but they came to her. Allergies   Allergen Reactions    Pollen Extract      Nasal congestion     Prior to Visit Medications    Medication Sig Taking?  Authorizing Provider   hydroCHLOROthiazide (HYDRODIURIL) 25 MG tablet TAKE 1 TABLET BY MOUTH EVERY DAY IN THE MORNING Yes Rehana Boyle MD fluticasone (FLONASE) 50 MCG/ACT nasal spray INSTILL 2 SPRAYS IN EACH NOSTRIL EVERY DAY Yes Eusebia Claudio MD   indapamide (LOZOL) 2.5 MG tablet TAKE 1 TABLET BY MOUTH TWICE A DAY Yes Eusebia Claudio MD   zolpidem (AMBIEN) 10 MG tablet TAKE 1 TABLET BY MOUTH NIGHTLY AS NEEDED FOR SLEEP FOR UP TO 90 DAYS. Yes Kd Field MD   losartan (COZAAR) 100 MG tablet Take 1 tablet by mouth daily Yes Kd Field MD   albuterol sulfate  (90 Base) MCG/ACT inhaler Inhale 2 puffs into the lungs every 6 hours as needed for Wheezing Yes Kd Field MD   atorvastatin (LIPITOR) 40 MG tablet Take 1 tablet by mouth daily Yes Kd Field MD   mirabegron (MYRBETRIQ) 50 MG TB24 Take 50 mg by mouth daily Yes Kd Field MD   verapamil (CALAN SR) 240 MG extended release tablet Take 1 tablet by mouth nightly Yes Kd Field MD   DULoxetine (CYMBALTA) 60 MG extended release capsule Take 1 capsule by mouth daily Yes Kd Field MD   amLODIPine (NORVASC) 10 MG tablet TAKE 1 TABLET BY MOUTH EVERY DAY Yes Kd Field MD   citalopram (CELEXA) 40 MG tablet TAKE 1 TABLET BY MOUTH EVERY DAY Yes Eusebia Claudio MD   zinc gluconate 50 MG tablet Take 50 mg by mouth daily Yes Historical Provider, MD   Cholecalciferol (VITAMIN D) 50 MCG (2000 UT) CAPS capsule Take by mouth Yes Historical Provider, MD   gabapentin (NEURONTIN) 100 MG capsule Take 200 mg by mouth 2 times daily. Yes Historical Provider, MD   HYDROcodone-acetaminophen (NORCO) 7.5-325 MG per tablet Take one up to three times a day as needed for arthritis flares Yes Pebbles Thurman MD   hydrOXYzine (ATARAX) 50 MG tablet TAKE 1 TABLET BY MOUTH NIGHTLY AS NEEDED (SLEEP). Yes Artur Zuluaga APRN - CNP   Pediatric Multivitamins-Fl (MULT-VITAMIN/FLUORIDE PO) Take  by mouth.    Yes Historical Provider, MD   calcium carbonate (OSCAL) 500 MG TABS tablet Take 500 mg by mouth daily. Yes Historical Provider, MD   losartan-hydroCHLOROthiazide (HYZAAR) 100-25 MG per tablet TAKE 1 TABLET BY MOUTH EVERY DAY  Patient not taking: Reported on 4/27/2022  MD Nelson PazTeam (Including outside providers/suppliers regularly involved in providing care):   Patient Care Team:  Silvia Rodriguez MD as PCP - General (Family Medicine)  Silvia Rodriguez MD as PCP - [facility] Empaneled Provider    Reviewed and updated this visit:  Allergies  Meds       Labs ordered for pt to get fasting at later date. Health Maintenance reviewed and updated. Griselda Dupes, LPN, 2/40/6607, performed the documented evaluation under the direct supervision of the attending physician.

## 2022-05-16 RX ORDER — ALBUTEROL SULFATE 90 UG/1
AEROSOL, METERED RESPIRATORY (INHALATION)
Qty: 6.7 EACH | Refills: 2 | Status: SHIPPED | OUTPATIENT
Start: 2022-05-16

## 2022-05-16 RX ORDER — VERAPAMIL HYDROCHLORIDE 240 MG/1
240 TABLET, FILM COATED, EXTENDED RELEASE ORAL NIGHTLY
Qty: 180 TABLET | Refills: 3 | Status: SHIPPED | OUTPATIENT
Start: 2022-05-16

## 2022-05-25 DIAGNOSIS — F51.01 PRIMARY INSOMNIA: ICD-10-CM

## 2022-05-25 RX ORDER — FLUTICASONE PROPIONATE 50 MCG
SPRAY, SUSPENSION (ML) NASAL
Qty: 48 G | Refills: 3 | Status: SHIPPED | OUTPATIENT
Start: 2022-05-25

## 2022-05-25 RX ORDER — ZOLPIDEM TARTRATE 10 MG/1
TABLET ORAL
Qty: 30 TABLET | Refills: 2 | Status: SHIPPED | OUTPATIENT
Start: 2022-05-25 | End: 2022-08-26

## 2022-06-01 DIAGNOSIS — F41.0 PANIC DISORDER (EPISODIC PAROXYSMAL ANXIETY): ICD-10-CM

## 2022-06-01 RX ORDER — MELOXICAM 15 MG/1
TABLET ORAL
Qty: 90 TABLET | Refills: 2 | Status: SHIPPED | OUTPATIENT
Start: 2022-06-01

## 2022-06-01 RX ORDER — ALPRAZOLAM 0.5 MG/1
0.5 TABLET ORAL 3 TIMES DAILY PRN
Qty: 90 TABLET | Refills: 0 | Status: SHIPPED | OUTPATIENT
Start: 2022-06-01 | End: 2022-07-28

## 2022-06-01 NOTE — TELEPHONE ENCOUNTER
Provider needs to review PDMP    PDMP Monitoring:    Last PDMP Tammy Alcazar as Reviewed [facility]):  Review User Review Instant Review Result   CHRIS Wagner 2/28/2022  9:24 AM Reviewed PDMP [1]     Urine Drug Screenings (1 yr)     POCT Rapid Drug Screen  Resulted: 11/13/2015 10:45 AM (Final result)    Complete Results          POCT Rapid Drug Screen  Collected: 8/11/2015 11:24 AM (Final result)    Complete Results              Medication Contract and Consent for Opioid Use Documents Filed     Patient Documents     Type of Document Status Date Received Received By Description    Medication Contract [Status Missing]       Medication Contract Signed 10/4/2017 11:56 AM Kolby Fernandez

## 2022-07-05 ENCOUNTER — APPOINTMENT (OUTPATIENT)
Dept: CT IMAGING | Facility: HOSPITAL | Age: 82
End: 2022-07-05

## 2022-07-05 ENCOUNTER — APPOINTMENT (OUTPATIENT)
Dept: GENERAL RADIOLOGY | Facility: HOSPITAL | Age: 82
End: 2022-07-05

## 2022-07-05 ENCOUNTER — HOSPITAL ENCOUNTER (OUTPATIENT)
Facility: HOSPITAL | Age: 82
Setting detail: OBSERVATION
LOS: 1 days | Discharge: HOME HEALTH | End: 2022-07-07
Attending: EMERGENCY MEDICINE | Admitting: FAMILY MEDICINE

## 2022-07-05 DIAGNOSIS — R41.82 ALTERED MENTAL STATUS, UNSPECIFIED ALTERED MENTAL STATUS TYPE: Primary | ICD-10-CM

## 2022-07-05 DIAGNOSIS — G89.4 CHRONIC PAIN SYNDROME: ICD-10-CM

## 2022-07-05 DIAGNOSIS — F41.9 ANXIETY DISORDER, UNSPECIFIED TYPE: ICD-10-CM

## 2022-07-05 DIAGNOSIS — R46.89 COGNITIVE AND BEHAVIORAL CHANGES: ICD-10-CM

## 2022-07-05 DIAGNOSIS — R41.89 COGNITIVE AND BEHAVIORAL CHANGES: ICD-10-CM

## 2022-07-05 DIAGNOSIS — Z78.9 DECREASED ACTIVITIES OF DAILY LIVING (ADL): ICD-10-CM

## 2022-07-05 PROBLEM — Z91.89 AT RISK FOR POLYPHARMACY: Status: ACTIVE | Noted: 2022-07-05

## 2022-07-05 LAB
ALBUMIN SERPL-MCNC: 3.6 G/DL (ref 3.5–5.2)
ALBUMIN/GLOB SERPL: 1.4 G/DL
ALP SERPL-CCNC: 85 U/L (ref 39–117)
ALT SERPL W P-5'-P-CCNC: 10 U/L (ref 1–33)
AMPHET+METHAMPHET UR QL: NEGATIVE
AMPHETAMINES UR QL: NEGATIVE
ANION GAP SERPL CALCULATED.3IONS-SCNC: 10 MMOL/L (ref 5–15)
APTT PPP: 31.6 SECONDS (ref 24.1–35)
AST SERPL-CCNC: 14 U/L (ref 1–32)
BACTERIA UR QL AUTO: ABNORMAL /HPF
BARBITURATES UR QL SCN: NEGATIVE
BASOPHILS # BLD AUTO: 0.03 10*3/MM3 (ref 0–0.2)
BASOPHILS NFR BLD AUTO: 0.5 % (ref 0–1.5)
BENZODIAZ UR QL SCN: POSITIVE
BILIRUB SERPL-MCNC: 0.2 MG/DL (ref 0–1.2)
BILIRUB UR QL STRIP: ABNORMAL
BUN SERPL-MCNC: 25 MG/DL (ref 8–23)
BUN/CREAT SERPL: 32.1 (ref 7–25)
BUPRENORPHINE SERPL-MCNC: NEGATIVE NG/ML
CALCIUM SPEC-SCNC: 9.4 MG/DL (ref 8.6–10.5)
CANNABINOIDS SERPL QL: NEGATIVE
CHLORIDE SERPL-SCNC: 102 MMOL/L (ref 98–107)
CLARITY UR: CLEAR
CO2 SERPL-SCNC: 26 MMOL/L (ref 22–29)
COCAINE UR QL: NEGATIVE
COLOR UR: ABNORMAL
CREAT SERPL-MCNC: 0.78 MG/DL (ref 0.57–1)
D-LACTATE SERPL-SCNC: 1.6 MMOL/L (ref 0.5–2)
DEPRECATED RDW RBC AUTO: 46.5 FL (ref 37–54)
EGFRCR SERPLBLD CKD-EPI 2021: 75.9 ML/MIN/1.73
EOSINOPHIL # BLD AUTO: 0.26 10*3/MM3 (ref 0–0.4)
EOSINOPHIL NFR BLD AUTO: 4.7 % (ref 0.3–6.2)
ERYTHROCYTE [DISTWIDTH] IN BLOOD BY AUTOMATED COUNT: 13.8 % (ref 12.3–15.4)
GLOBULIN UR ELPH-MCNC: 2.6 GM/DL
GLUCOSE SERPL-MCNC: 129 MG/DL (ref 65–99)
GLUCOSE UR STRIP-MCNC: NEGATIVE MG/DL
GRAN CASTS URNS QL MICRO: ABNORMAL /LPF
HCT VFR BLD AUTO: 40.9 % (ref 34–46.6)
HGB BLD-MCNC: 13.3 G/DL (ref 12–15.9)
HGB UR QL STRIP.AUTO: NEGATIVE
HYALINE CASTS UR QL AUTO: ABNORMAL /LPF
IMM GRANULOCYTES # BLD AUTO: 0 10*3/MM3 (ref 0–0.05)
IMM GRANULOCYTES NFR BLD AUTO: 0 % (ref 0–0.5)
INR PPP: 1.18 (ref 0.91–1.09)
KETONES UR QL STRIP: ABNORMAL
LEUKOCYTE ESTERASE UR QL STRIP.AUTO: ABNORMAL
LYMPHOCYTES # BLD AUTO: 1.7 10*3/MM3 (ref 0.7–3.1)
LYMPHOCYTES NFR BLD AUTO: 30.7 % (ref 19.6–45.3)
MCH RBC QN AUTO: 29.7 PG (ref 26.6–33)
MCHC RBC AUTO-ENTMCNC: 32.5 G/DL (ref 31.5–35.7)
MCV RBC AUTO: 91.3 FL (ref 79–97)
METHADONE UR QL SCN: NEGATIVE
MONOCYTES # BLD AUTO: 0.54 10*3/MM3 (ref 0.1–0.9)
MONOCYTES NFR BLD AUTO: 9.7 % (ref 5–12)
MUCOUS THREADS URNS QL MICRO: ABNORMAL /HPF
NEUTROPHILS NFR BLD AUTO: 3.01 10*3/MM3 (ref 1.7–7)
NEUTROPHILS NFR BLD AUTO: 54.4 % (ref 42.7–76)
NITRITE UR QL STRIP: NEGATIVE
NRBC BLD AUTO-RTO: 0 /100 WBC (ref 0–0.2)
OPIATES UR QL: POSITIVE
OXYCODONE UR QL SCN: NEGATIVE
PCP UR QL SCN: NEGATIVE
PH UR STRIP.AUTO: 6 [PH] (ref 5–8)
PLATELET # BLD AUTO: 314 10*3/MM3 (ref 140–450)
PMV BLD AUTO: 9.6 FL (ref 6–12)
POTASSIUM SERPL-SCNC: 4.1 MMOL/L (ref 3.5–5.2)
PROPOXYPH UR QL: NEGATIVE
PROT SERPL-MCNC: 6.2 G/DL (ref 6–8.5)
PROT UR QL STRIP: NEGATIVE
PROTHROMBIN TIME: 14.6 SECONDS (ref 11.9–14.6)
RBC # BLD AUTO: 4.48 10*6/MM3 (ref 3.77–5.28)
RBC # UR STRIP: ABNORMAL /HPF
REF LAB TEST METHOD: ABNORMAL
SARS-COV-2 RNA PNL SPEC NAA+PROBE: NOT DETECTED
SODIUM SERPL-SCNC: 138 MMOL/L (ref 136–145)
SP GR UR STRIP: 1.03 (ref 1–1.03)
SQUAMOUS #/AREA URNS HPF: ABNORMAL /HPF
TRICYCLICS UR QL SCN: NEGATIVE
UROBILINOGEN UR QL STRIP: ABNORMAL
WBC # UR STRIP: ABNORMAL /HPF
WBC CLUMPS # UR AUTO: ABNORMAL /HPF
WBC NRBC COR # BLD: 5.54 10*3/MM3 (ref 3.4–10.8)
YEAST URNS QL MICRO: ABNORMAL /HPF

## 2022-07-05 PROCEDURE — 81001 URINALYSIS AUTO W/SCOPE: CPT | Performed by: EMERGENCY MEDICINE

## 2022-07-05 PROCEDURE — 85610 PROTHROMBIN TIME: CPT | Performed by: EMERGENCY MEDICINE

## 2022-07-05 PROCEDURE — 87040 BLOOD CULTURE FOR BACTERIA: CPT | Performed by: EMERGENCY MEDICINE

## 2022-07-05 PROCEDURE — C9803 HOPD COVID-19 SPEC COLLECT: HCPCS

## 2022-07-05 PROCEDURE — 80053 COMPREHEN METABOLIC PANEL: CPT | Performed by: EMERGENCY MEDICINE

## 2022-07-05 PROCEDURE — 83605 ASSAY OF LACTIC ACID: CPT | Performed by: EMERGENCY MEDICINE

## 2022-07-05 PROCEDURE — 85025 COMPLETE CBC W/AUTO DIFF WBC: CPT | Performed by: EMERGENCY MEDICINE

## 2022-07-05 PROCEDURE — 85730 THROMBOPLASTIN TIME PARTIAL: CPT | Performed by: EMERGENCY MEDICINE

## 2022-07-05 PROCEDURE — 70450 CT HEAD/BRAIN W/O DYE: CPT

## 2022-07-05 PROCEDURE — 87635 SARS-COV-2 COVID-19 AMP PRB: CPT | Performed by: EMERGENCY MEDICINE

## 2022-07-05 PROCEDURE — 71045 X-RAY EXAM CHEST 1 VIEW: CPT

## 2022-07-05 PROCEDURE — 99285 EMERGENCY DEPT VISIT HI MDM: CPT

## 2022-07-05 PROCEDURE — 36415 COLL VENOUS BLD VENIPUNCTURE: CPT

## 2022-07-05 PROCEDURE — 80306 DRUG TEST PRSMV INSTRMNT: CPT | Performed by: NURSE PRACTITIONER

## 2022-07-05 RX ORDER — FLUTICASONE PROPIONATE 50 MCG
2 SPRAY, SUSPENSION (ML) NASAL DAILY
Status: DISCONTINUED | OUTPATIENT
Start: 2022-07-06 | End: 2022-07-07 | Stop reason: HOSPADM

## 2022-07-05 RX ORDER — ATORVASTATIN CALCIUM 40 MG/1
40 TABLET, FILM COATED ORAL DAILY
COMMUNITY

## 2022-07-05 RX ORDER — AMLODIPINE BESYLATE 10 MG/1
10 TABLET ORAL DAILY
Status: ON HOLD | COMMUNITY
End: 2022-07-06

## 2022-07-05 RX ORDER — ASPIRIN 300 MG/1
300 SUPPOSITORY RECTAL DAILY
Status: DISCONTINUED | OUTPATIENT
Start: 2022-07-06 | End: 2022-07-07 | Stop reason: HOSPADM

## 2022-07-05 RX ORDER — ALBUTEROL SULFATE 2.5 MG/3ML
2.5 SOLUTION RESPIRATORY (INHALATION) EVERY 4 HOURS PRN
Status: ON HOLD | COMMUNITY
End: 2022-07-05

## 2022-07-05 RX ORDER — GABAPENTIN 100 MG/1
100 CAPSULE ORAL 2 TIMES DAILY PRN
Status: ON HOLD | COMMUNITY
End: 2022-07-07 | Stop reason: SDUPTHER

## 2022-07-05 RX ORDER — ALPRAZOLAM 0.5 MG/1
0.5 TABLET ORAL NIGHTLY PRN
COMMUNITY
End: 2022-07-07 | Stop reason: HOSPADM

## 2022-07-05 RX ORDER — ALBUTEROL SULFATE 90 UG/1
2 AEROSOL, METERED RESPIRATORY (INHALATION) EVERY 6 HOURS PRN
COMMUNITY

## 2022-07-05 RX ORDER — HYDROCODONE BITARTRATE AND ACETAMINOPHEN 7.5; 325 MG/1; MG/1
1 TABLET ORAL 3 TIMES DAILY PRN
Status: ON HOLD | COMMUNITY
End: 2022-07-07 | Stop reason: SDUPTHER

## 2022-07-05 RX ORDER — ONDANSETRON 4 MG/1
4 TABLET, FILM COATED ORAL EVERY 6 HOURS PRN
Status: DISCONTINUED | OUTPATIENT
Start: 2022-07-05 | End: 2022-07-07 | Stop reason: HOSPADM

## 2022-07-05 RX ORDER — ACETAMINOPHEN 325 MG/1
650 TABLET ORAL EVERY 4 HOURS PRN
Status: DISCONTINUED | OUTPATIENT
Start: 2022-07-05 | End: 2022-07-07 | Stop reason: HOSPADM

## 2022-07-05 RX ORDER — SODIUM CHLORIDE 0.9 % (FLUSH) 0.9 %
10 SYRINGE (ML) INJECTION AS NEEDED
Status: DISCONTINUED | OUTPATIENT
Start: 2022-07-05 | End: 2022-07-07 | Stop reason: HOSPADM

## 2022-07-05 RX ORDER — ASPIRIN 81 MG/1
81 TABLET, CHEWABLE ORAL DAILY
Status: DISCONTINUED | OUTPATIENT
Start: 2022-07-06 | End: 2022-07-07 | Stop reason: HOSPADM

## 2022-07-05 RX ORDER — HYDROXYZINE 50 MG/1
50 TABLET, FILM COATED ORAL NIGHTLY PRN
Status: ON HOLD | COMMUNITY
End: 2022-07-06

## 2022-07-05 RX ORDER — ONDANSETRON 2 MG/ML
4 INJECTION INTRAMUSCULAR; INTRAVENOUS EVERY 6 HOURS PRN
Status: DISCONTINUED | OUTPATIENT
Start: 2022-07-05 | End: 2022-07-07 | Stop reason: HOSPADM

## 2022-07-05 RX ORDER — LOSARTAN POTASSIUM 50 MG/1
100 TABLET ORAL DAILY
Status: DISCONTINUED | OUTPATIENT
Start: 2022-07-06 | End: 2022-07-07 | Stop reason: HOSPADM

## 2022-07-05 RX ORDER — ZOLPIDEM TARTRATE 10 MG/1
5 TABLET ORAL NIGHTLY
COMMUNITY

## 2022-07-05 RX ORDER — ATORVASTATIN CALCIUM 40 MG/1
80 TABLET, FILM COATED ORAL NIGHTLY
Status: DISCONTINUED | OUTPATIENT
Start: 2022-07-05 | End: 2022-07-07 | Stop reason: HOSPADM

## 2022-07-05 RX ORDER — VERAPAMIL HYDROCHLORIDE 240 MG/1
240 TABLET, FILM COATED, EXTENDED RELEASE ORAL NIGHTLY
Status: DISCONTINUED | OUTPATIENT
Start: 2022-07-05 | End: 2022-07-06

## 2022-07-05 RX ORDER — ACETAMINOPHEN 160 MG/5ML
650 SOLUTION ORAL EVERY 4 HOURS PRN
Status: DISCONTINUED | OUTPATIENT
Start: 2022-07-05 | End: 2022-07-07 | Stop reason: HOSPADM

## 2022-07-05 RX ORDER — HYDROCHLOROTHIAZIDE 25 MG/1
25 TABLET ORAL DAILY
COMMUNITY

## 2022-07-05 RX ORDER — VERAPAMIL HYDROCHLORIDE 240 MG/1
240 TABLET, FILM COATED, EXTENDED RELEASE ORAL NIGHTLY
Status: ON HOLD | COMMUNITY
End: 2022-07-06

## 2022-07-05 RX ORDER — FLUTICASONE PROPIONATE 50 MCG
2 SPRAY, SUSPENSION (ML) NASAL DAILY
COMMUNITY

## 2022-07-05 RX ORDER — PHENOL 1.4 %
600 AEROSOL, SPRAY (ML) MUCOUS MEMBRANE DAILY
COMMUNITY

## 2022-07-05 RX ORDER — ZOLPIDEM TARTRATE 5 MG/1
10 TABLET ORAL NIGHTLY
Status: DISCONTINUED | OUTPATIENT
Start: 2022-07-05 | End: 2022-07-06

## 2022-07-05 RX ORDER — SODIUM CHLORIDE 0.9 % (FLUSH) 0.9 %
10 SYRINGE (ML) INJECTION EVERY 12 HOURS SCHEDULED
Status: DISCONTINUED | OUTPATIENT
Start: 2022-07-05 | End: 2022-07-07 | Stop reason: HOSPADM

## 2022-07-05 RX ORDER — MULTIVIT-MIN/IRON/FOLIC ACID/K 18-600-40
2000 CAPSULE ORAL DAILY
COMMUNITY

## 2022-07-05 RX ORDER — AMLODIPINE BESYLATE 10 MG/1
10 TABLET ORAL DAILY
Status: DISCONTINUED | OUTPATIENT
Start: 2022-07-06 | End: 2022-07-06

## 2022-07-05 RX ORDER — DULOXETIN HYDROCHLORIDE 60 MG/1
60 CAPSULE, DELAYED RELEASE ORAL DAILY
COMMUNITY

## 2022-07-05 RX ORDER — DULOXETIN HYDROCHLORIDE 30 MG/1
60 CAPSULE, DELAYED RELEASE ORAL DAILY
Status: DISCONTINUED | OUTPATIENT
Start: 2022-07-06 | End: 2022-07-07 | Stop reason: HOSPADM

## 2022-07-05 RX ORDER — ACETAMINOPHEN 650 MG/1
650 SUPPOSITORY RECTAL EVERY 4 HOURS PRN
Status: DISCONTINUED | OUTPATIENT
Start: 2022-07-05 | End: 2022-07-07 | Stop reason: HOSPADM

## 2022-07-05 RX ORDER — LOSARTAN POTASSIUM 100 MG/1
100 TABLET ORAL DAILY
COMMUNITY

## 2022-07-06 ENCOUNTER — APPOINTMENT (OUTPATIENT)
Dept: ULTRASOUND IMAGING | Facility: HOSPITAL | Age: 82
End: 2022-07-06

## 2022-07-06 ENCOUNTER — APPOINTMENT (OUTPATIENT)
Dept: MRI IMAGING | Facility: HOSPITAL | Age: 82
End: 2022-07-06

## 2022-07-06 ENCOUNTER — APPOINTMENT (OUTPATIENT)
Dept: NEUROLOGY | Facility: HOSPITAL | Age: 82
End: 2022-07-06

## 2022-07-06 PROBLEM — R41.82 ALTERED MENTAL STATUS, UNSPECIFIED ALTERED MENTAL STATUS TYPE: Status: ACTIVE | Noted: 2022-07-06

## 2022-07-06 LAB
ANION GAP SERPL CALCULATED.3IONS-SCNC: 9 MMOL/L (ref 5–15)
BUN SERPL-MCNC: 22 MG/DL (ref 8–23)
BUN/CREAT SERPL: 37.9 (ref 7–25)
CALCIUM SPEC-SCNC: 9.2 MG/DL (ref 8.6–10.5)
CHLORIDE SERPL-SCNC: 102 MMOL/L (ref 98–107)
CHOLEST SERPL-MCNC: 138 MG/DL (ref 0–200)
CO2 SERPL-SCNC: 26 MMOL/L (ref 22–29)
CREAT SERPL-MCNC: 0.58 MG/DL (ref 0.57–1)
DEPRECATED RDW RBC AUTO: 46.4 FL (ref 37–54)
EGFRCR SERPLBLD CKD-EPI 2021: 90.5 ML/MIN/1.73
ERYTHROCYTE [DISTWIDTH] IN BLOOD BY AUTOMATED COUNT: 13.8 % (ref 12.3–15.4)
GLUCOSE BLDC GLUCOMTR-MCNC: 107 MG/DL (ref 70–130)
GLUCOSE SERPL-MCNC: 97 MG/DL (ref 65–99)
HBA1C MFR BLD: 5.5 % (ref 4.8–5.6)
HCT VFR BLD AUTO: 40.2 % (ref 34–46.6)
HDLC SERPL-MCNC: 57 MG/DL (ref 40–60)
HGB BLD-MCNC: 12.8 G/DL (ref 12–15.9)
LDLC SERPL CALC-MCNC: 69 MG/DL (ref 0–100)
LDLC/HDLC SERPL: 1.22 {RATIO}
MCH RBC QN AUTO: 29.2 PG (ref 26.6–33)
MCHC RBC AUTO-ENTMCNC: 31.8 G/DL (ref 31.5–35.7)
MCV RBC AUTO: 91.6 FL (ref 79–97)
PLATELET # BLD AUTO: 291 10*3/MM3 (ref 140–450)
PMV BLD AUTO: 10.2 FL (ref 6–12)
POTASSIUM SERPL-SCNC: 3.7 MMOL/L (ref 3.5–5.2)
RBC # BLD AUTO: 4.39 10*6/MM3 (ref 3.77–5.28)
SODIUM SERPL-SCNC: 137 MMOL/L (ref 136–145)
TRIGL SERPL-MCNC: 57 MG/DL (ref 0–150)
TSH SERPL DL<=0.05 MIU/L-ACNC: 4.24 UIU/ML (ref 0.27–4.2)
VLDLC SERPL-MCNC: 12 MG/DL (ref 5–40)
WBC NRBC COR # BLD: 5.47 10*3/MM3 (ref 3.4–10.8)

## 2022-07-06 PROCEDURE — 70551 MRI BRAIN STEM W/O DYE: CPT

## 2022-07-06 PROCEDURE — 84443 ASSAY THYROID STIM HORMONE: CPT | Performed by: NURSE PRACTITIONER

## 2022-07-06 PROCEDURE — 80048 BASIC METABOLIC PNL TOTAL CA: CPT | Performed by: NURSE PRACTITIONER

## 2022-07-06 PROCEDURE — 83036 HEMOGLOBIN GLYCOSYLATED A1C: CPT | Performed by: NURSE PRACTITIONER

## 2022-07-06 PROCEDURE — G0378 HOSPITAL OBSERVATION PER HR: HCPCS

## 2022-07-06 PROCEDURE — 82962 GLUCOSE BLOOD TEST: CPT

## 2022-07-06 PROCEDURE — 93880 EXTRACRANIAL BILAT STUDY: CPT | Performed by: SURGERY

## 2022-07-06 PROCEDURE — 93880 EXTRACRANIAL BILAT STUDY: CPT

## 2022-07-06 PROCEDURE — 95816 EEG AWAKE AND DROWSY: CPT

## 2022-07-06 PROCEDURE — 80061 LIPID PANEL: CPT | Performed by: NURSE PRACTITIONER

## 2022-07-06 PROCEDURE — 85027 COMPLETE CBC AUTOMATED: CPT | Performed by: NURSE PRACTITIONER

## 2022-07-06 PROCEDURE — 97165 OT EVAL LOW COMPLEX 30 MIN: CPT | Performed by: OCCUPATIONAL THERAPIST

## 2022-07-06 PROCEDURE — 99204 OFFICE O/P NEW MOD 45 MIN: CPT | Performed by: PHYSICIAN ASSISTANT

## 2022-07-06 PROCEDURE — 96125 COGNITIVE TEST BY HC PRO: CPT | Performed by: SPEECH-LANGUAGE PATHOLOGIST

## 2022-07-06 RX ORDER — ZINC SULFATE 50(220)MG
1 CAPSULE ORAL DAILY
COMMUNITY

## 2022-07-06 RX ORDER — LANOLIN ALCOHOL/MO/W.PET/CERES
3 CREAM (GRAM) TOPICAL NIGHTLY
Status: DISCONTINUED | OUTPATIENT
Start: 2022-07-06 | End: 2022-07-07 | Stop reason: HOSPADM

## 2022-07-06 RX ADMIN — DULOXETINE HYDROCHLORIDE 60 MG: 30 CAPSULE, DELAYED RELEASE ORAL at 08:40

## 2022-07-06 RX ADMIN — ASPIRIN 81 MG: 81 TABLET, CHEWABLE ORAL at 08:40

## 2022-07-06 RX ADMIN — FLUTICASONE PROPIONATE 2 SPRAY: 50 SPRAY, METERED NASAL at 08:39

## 2022-07-06 RX ADMIN — Medication 3 MG: at 23:09

## 2022-07-06 RX ADMIN — ATORVASTATIN CALCIUM 80 MG: 40 TABLET, FILM COATED ORAL at 20:13

## 2022-07-06 RX ADMIN — LOSARTAN POTASSIUM 100 MG: 50 TABLET, FILM COATED ORAL at 09:41

## 2022-07-06 RX ADMIN — Medication 10 ML: at 20:13

## 2022-07-06 RX ADMIN — VERAPAMIL HYDROCHLORIDE 240 MG: 240 TABLET, FILM COATED, EXTENDED RELEASE ORAL at 02:03

## 2022-07-06 RX ADMIN — Medication 10 ML: at 00:52

## 2022-07-06 RX ADMIN — ZOLPIDEM TARTRATE 10 MG: 5 TABLET ORAL at 00:52

## 2022-07-06 RX ADMIN — Medication 10 ML: at 08:41

## 2022-07-06 RX ADMIN — ATORVASTATIN CALCIUM 80 MG: 40 TABLET, FILM COATED ORAL at 00:51

## 2022-07-06 RX ADMIN — AMLODIPINE BESYLATE 10 MG: 10 TABLET ORAL at 08:40

## 2022-07-06 NOTE — THERAPY EVALUATION
Patient Name: Debra Neri  : 1940    MRN: [id number]                              Today's Date: 2022       Admit Date: 2022    Visit Dx:     ICD-10-CM ICD-9-CM   1. Altered mental status, unspecified altered mental status type  R41.82 780.97   2. Cognitive and behavioral changes  R41.89 799.59    R46.89 312.9   3. Decreased activities of daily living (ADL)  Z78.9 V49.89     Patient Active Problem List   Diagnosis   • Altered mental status   • At risk for polypharmacy   • Chronic pain syndrome   • Anxiety disorder   • Altered mental status, unspecified altered mental status type     Past Medical History:   Diagnosis Date   • Anxiety    • Chronic back pain    • Hypertension    • Osteoarthritis    • Panic attacks    • Rectal carcinoid tumor 2014   • Seasonal allergies    • Urinary retention    • UTI (urinary tract infection)      Past Surgical History:   Procedure Laterality Date   • APPENDECTOMY     • BREAST BIOPSY      x 2   • CATARACT EXTRACTION Bilateral    • CHOLECYSTECTOMY     • COLONOSCOPY      2014   • REPLACEMENT TOTAL KNEE        General Information     Row Name 22 1400          OT Time and Intention    Document Type evaluation  Admitted with AMS and difficulty with word finding.  -MM     Mode of Treatment occupational therapy  -MM     Row Name 22 1400          General Information    Patient Profile Reviewed yes  -MM     Prior Level of Function independent:;all household mobility;community mobility;ADL's  Children assist with household tasks  -MM     Existing Precautions/Restrictions fall  -MM     Barriers to Rehab medically complex  -MM     Row Name 22 1400          Living Environment    People in Home alone;other (see comments)  Children are involved. Tub/shower combo with shower chair, grab bars and handheld shower head. Quad cane, rollator, hospital bed, w/c, BSC.  -MM     Row Name 22 1400          Home Main Entrance    Number of Stairs, Main  Entrance three  -MM     Stair Railings, Main Entrance railings on both sides of stairs  -MM     Row Name 07/06/22 1400          Stairs Within Home, Primary    Number of Stairs, Within Home, Primary none  -MM     Stair Railings, Within Home, Primary none  -MM     Row Name 07/06/22 1400          Cognition    Orientation Status (Cognition) oriented x 3;verbal cues/prompts needed for orientation;place  -MM     Row Name 07/06/22 1400          Safety Issues, Functional Mobility    Safety Issues Affecting Function (Mobility) insight into deficits/self-awareness;safety precaution awareness;safety precautions follow-through/compliance;sequencing abilities;at risk behavior observed  -MM     Impairments Affecting Function (Mobility) endurance/activity tolerance;balance;cognition  -MM     Cognitive Impairments, Mobility Safety/Performance insight into deficits/self-awareness;problem-solving/reasoning;safety precaution awareness;safety precaution follow-through;sequencing abilities  -MM           User Key  (r) = Recorded By, (t) = Taken By, (c) = Cosigned By    Initials Name Provider Type    MM Gurdeep Lockwood, OTR/L Occupational Therapist                 Mobility/ADL's     Row Name 07/06/22 1400          Bed Mobility    Bed Mobility supine-sit  -MM     Supine-Sit [place] (Bed Mobility) independent;other (see comments)  pt completed supine to sit when OT out of room, reported indpendence.  -MM     Assistive Device (Bed Mobility) head of bed elevated  -MM     Row Name 07/06/22 1400          Transfers    Transfers sit-stand transfer;stand-sit transfer  -MM     Sit-Stand [place] (Transfers) contact guard;verbal cues  -MM     Stand-Sit [place] (Transfers) contact guard;verbal cues  -MM     Row Name 07/06/22 1400          Sit-Stand Transfer    Assistive Device (Sit-Stand Transfers) walker, front-wheeled  -MM     Row Name 07/06/22 1400          Stand-Sit Transfer    Assistive Device (Stand-Sit Transfers) walker,  front-wheeled  -MM     Row Name 07/06/22 1400          Functional Mobility    Functional Mobility- Ind. Level contact guard assist;verbal cues required  -MM     Functional Mobility- Device walker, front-wheeled  -MM     Functional Mobility- Comment bed to [place] to bed  -MM     Row Name 07/06/22 1400          Activities of Daily Living    BADL Assessment/Intervention lower body dressing  -MM     Row Name 07/06/22 1400          Lower Body Dressing Assessment/Training    [place] Level (Lower Body Dressing) don;doff;socks;supervision;verbal cues;nonverbal cues (demo/gesture)  -MM     Position (Lower Body Dressing) edge of bed sitting  -MM           User Key  (r) = Recorded By, (t) = Taken By, (c) = Cosigned By    Initials Name Provider Type    MM Gurdeep Lockwood, OTR/L Occupational Therapist               Obj/Interventions     [facility] Name 07/06/22 1400          Sensory Assessment (Somatosensory)    Sensory Assessment (Somatosensory) UE sensation intact  -MM     Row Name 07/06/22 1400          Range of Motion Comprehensive    General Range of Motion no range of motion deficits identified  -MM     Row Name 07/06/22 1400          Strength Comprehensive (MMT)    General Manual Muscle Testing (MMT) Assessment no strength deficits identified  -MM     Comment, General Manual Muscle Testing (MMT) Assessment grossly strength 4+/5  -MM     [facility] Name 07/06/22 1400          Motor Skills    Motor Skills coordination  -MM     Coordination other (see comments)  Mild difficulty with FM and GM tasks, likely d/t difficulty understanding task rather than coordination deficit  -MM     [facility] Name 07/06/22 1400          Balance    Balance Assessment sitting static balance;sitting dynamic balance;standing static balance;standing dynamic balance  -MM     Static Sitting Balance standby assist  -MM     Dynamic Sitting Balance standby assist;supervision  -MM     Position, Sitting Balance unsupported;sitting edge of bed  -MM     Static Standing  Balance contact guard  -MM     Dynamic Standing Balance contact guard;verbal cues  -MM     Position/Device Used, Standing Balance supported;walker, front-wheeled  -MM           User Key  (r) = Recorded By, (t) = Taken By, (c) = Cosigned By    Initials Name Provider Type    Gurdeep Lockwood, OTR/L Occupational Therapist               Goals/Plan     Row Name 07/06/22 1400          Transfer Goal 1 (OT)    Activity/Assistive Device (Transfer Goal 1, OT) tub;shower chair  -MM     [place] Level/Cues Needed (Transfer Goal 1, OT) supervision required  -MM     Time Frame (Transfer Goal 1, OT) long term goal (LTG);by discharge  -MM     Progress/Outcome (Transfer Goal 1, OT) goal ongoing  -MM     Row Name 07/06/22 1400          Bathing Goal 1 (OT)    Activity/Device (Bathing Goal 1, OT) bathing skills, all;shower chair  -MM     Saline Level/Cues Needed (Bathing Goal 1, OT) independent  -MM     Time Frame (Bathing Goal 1, OT) long term goal (LTG);by discharge  -MM     Progress/Outcomes (Bathing Goal 1, OT) goal ongoing  -MM     Row Name 07/06/22 1400          Dressing Goal 1 (OT)    Activity/Device (Dressing Goal 1, OT) dressing skills, all  -MM     Saline/Cues Needed (Dressing Goal 1, OT) independent  -MM     Time Frame (Dressing Goal 1, OT) long term goal (LTG);by discharge  -MM     Progress/Outcome (Dressing Goal 1, OT) goal ongoing  -MM     Row Name 07/06/22 1400          Therapy Assessment/Plan (OT)    Planned Therapy Interventions (OT) activity tolerance training;adaptive equipment training;BADL retraining;cognitive/visual perception retraining;functional balance retraining;occupation/activity based interventions;passive ROM/stretching;patient/caregiver education/training;ROM/therapeutic exercise;strengthening exercise;transfer/mobility retraining;neuromuscular control/coordination retraining  -MM           User Key  (r) = Recorded By, (t) = Taken By, (c) = Cosigned By    Initials Name Provider Type     MM Gurdeep Lockwood, OTR/L Occupational Therapist               Clinical Impression     Row Name 07/06/22 1400          Pain Assessment    Pretreatment Pain Rating 0/10 - no pain  -MM     Posttreatment Pain Rating 0/10 - no pain  -MM     Row Name 07/06/22 1400          Plan of Care Review    Plan of Care Reviewed With patient  -MM     Progress no change  -MM     Outcome Evaluation OT eval completed. pt is alert and oriented x4, with verbal cues for year and facility. Pt with min word finding difficulties. Pt is agreeable and motivated to work with therapy. Pt was independent for supine to sit. Pt was CGA for t/f and functional mobility with RW and verbal cues. Pt was supervision with donning/doffing socks with verbal and tactile cues for understanding. Pt with decreased processing and sequencing of tasks at times. Pt would benefit from skilled OT to address adls, functional mobility and education. Pt lives alone but states daugther has been staying with her more and son is involved. Son is present and reports family could stay with her at d/c. Educated on home safety and AE/AD use. OT recommends continuing with RW use at this time for increased safety rather than quad cane, pt agrees with this recommendation. Recommended d/c home with 24/7 assist and HH.  -MM     Row Name 07/06/22 1400          Therapy Assessment/Plan (OT)    Patient/Family Therapy Goal Statement (OT) return home  -MM     Rehab Potential (OT) good, to achieve stated therapy goals  -MM     Criteria for Skilled Therapeutic Interventions Met (OT) yes;skilled treatment is necessary  -MM     Therapy Frequency (OT) 5 times/wk  -MM     Predicted Duration of Therapy Intervention (OT) until d/c  -MM     Row Name 07/06/22 1400          Therapy Plan Review/Discharge Plan (OT)    Anticipated Discharge Disposition (OT) home with 24/7 care;home with home health  -MM     Row Name 07/06/22 1400          Positioning and Restraints    Pre-Treatment Position in  bed  -MM     Post Treatment Position bed  -MM     In Bed notified nsg;sitting EOB;call light within reach;encouraged to call for assist;side rails up x2;with family/caregiver  -MM           User Key  (r) = Recorded By, (t) = Taken By, (c) = Cosigned By    Initials Name Provider Type    Gurdeep Lockwood OTR/L Occupational Therapist               Outcome Measures     Row Name 07/06/22 1400          How much help from another is currently needed...    Putting on and taking off regular lower body clothing? 3  -MM     Bathing (including washing, rinsing, and drying) 3  -MM     Toileting (which includes using toilet bed pan or urinal) 3  -MM     Putting on and taking off regular upper body clothing 3  -MM     Taking care of personal grooming (such as brushing teeth) 4  -MM     Eating meals 4  -MM     AM-PAC 6 Clicks Score (OT) 20  -MM     Row Name 07/06/22 1400          Functional Assessment    Outcome Measure Options AM-PAC 6 Clicks Daily Activity (OT)  -MM           User Key  (r) = Recorded By, (t) = Taken By, (c) = Cosigned By    Initials Name Provider Type    JODY Gurdeep Lockwood OTR/L Occupational Therapist                Occupational Therapy Education                 Title: PT OT SLP Therapies (In Progress)     Topic: Occupational Therapy (In Progress)     Point: ADL training (Done)     Description:   Instruct learner(s) on proper safety adaptation and remediation techniques during self care or transfers.   Instruct in proper use of assistive devices.              Learning Progress Summary           Patient Acceptance, E, VU,NR by MM at 7/6/2022 9625    Comment: OT role, benefits, POC, d/c planning, home and AE/AD safety awareness   Family Acceptance, E, VU,NR by MM at 7/6/2022 1455    Comment: OT role, benefits, POC, d/c planning, home and AE/AD safety awareness                   Point: Home exercise program (Not Started)     Description:   Instruct learner(s) on appropriate technique for monitoring,  assisting and/or progressing therapeutic exercises/activities.              Learner Progress:  Not documented in this visit.          Point: Precautions (Done)     Description:   Instruct learner(s) on prescribed precautions during self-care and functional transfers.              Learning Progress Summary           Patient Acceptance, E, VU,NR by  at 7/6/2022 1455    Comment: OT role, benefits, POC, d/c planning, home and AE/AD safety awareness   Family Acceptance, E, VU,NR by MM at 7/6/2022 1455    Comment: OT role, benefits, POC, d/c planning, home and AE/AD safety awareness                   Point: Body mechanics (Done)     Description:   Instruct learner(s) on proper positioning and spine alignment during self-care, functional mobility activities and/or exercises.              Learning Progress Summary           Patient Acceptance, E, VU,NR by  at 7/6/2022 1455    Comment: OT role, benefits, POC, d/c planning, home and AE/AD safety awareness   Family Acceptance, E, VU,NR by  at 7/6/2022 1455    Comment: OT role, benefits, POC, d/c planning, home and AE/AD safety awareness                               User Key     Initials Effective Dates Name Provider Type Discipline     06/16/21 -  Gurdeep Lockwood, OTR/L Occupational Therapist OT              OT Recommendation and Plan  Planned Therapy Interventions (OT): activity tolerance training, adaptive equipment training, BADL retraining, cognitive/visual perception retraining, functional balance retraining, occupation/activity based interventions, passive ROM/stretching, patient/caregiver education/training, ROM/therapeutic exercise, strengthening exercise, transfer/mobility retraining, neuromuscular control/coordination retraining  Therapy Frequency (OT): 5 times/wk  Plan of Care Review  Plan of Care Reviewed With: patient  Progress: no change  Outcome Evaluation: OT eval completed. pt is alert and oriented x4, with verbal cues for year and facility. Pt with  min word finding difficulties. Pt is agreeable and motivated to work with therapy. Pt was independent for supine to sit. Pt was CGA for t/f and functional mobility with RW and verbal cues. Pt was supervision with donning/doffing socks with verbal and tactile cues for understanding. Pt with decreased processing and sequencing of tasks at times. Pt would benefit from skilled OT to address adls, functional mobility and education. Pt lives alone but states daugther has been staying with her more and son is involved. Son is present and reports family could stay with her at d/c. Educated on home safety and AE/AD use. OT recommends continuing with RW use at this time for increased safety rather than quad cane, pt agrees with this recommendation. Recommended d/c home with 24/7 assist and HH.     Time Calculation:    Time Calculation- OT     Row Name 07/06/22 1400             Time Calculation- OT    OT Start Time 1355  +8 minutes chart review  -MM      OT Stop Time 1442  -MM      OT Time Calculation (min) 47 min  -MM      OT Received On 07/06/22  -MM      OT Goal Re-Cert Due Date 07/16/22  -MM            User Key  (r) = Recorded By, (t) = Taken By, (c) = Cosigned By    Initials Name Provider Type    MM Gurdeep Lockwood, OTR/L Occupational Therapist              Therapy Charges for Today     Code Description Service Date Service Provider Modifiers Qty    [id number] HC OT EVAL LOW COMPLEXITY 4 7/6/2022 Gurdeep Lockwood OTR/L GO 1               Gurdeep Lockwood OTR/[place]  7/6/2022

## 2022-07-06 NOTE — CASE MANAGEMENT/SOCIAL WORK
Continued Stay Note   Dolores     Patient Name: Debra Neri  MRN: [id number]  Today's Date: 7/6/2022    Admit Date: 7/5/2022     Discharge Plan     Row Name 07/06/22 1445       Plan    Plan Comments Attempted to see pt around noon but pt was off the floor for testing. Attempted again but therapy is currently working with pt. Will try again later today or tomorrow.               Discharge Codes    No documentation.               Expected Discharge Date and Time     Expected Discharge Date Expected Discharge Time    Jul 7, 2022             MANA Mckinley

## 2022-07-06 NOTE — PLAN OF CARE
Goal Outcome Evaluation:  Plan of Care Reviewed With: patient           Outcome Evaluation: Pt at beginning of shift was A&Ox3 now A&Ox4, continued NIH scale monitoring, has at times had difficulties with word finding. MRI, US of Carotids, and EEG performed today. Pending EEG results pt to dc home possibly tomorrow.

## 2022-07-06 NOTE — PLAN OF CARE
Goal Outcome Evaluation:  Plan of Care Reviewed With: patient        Progress: no change  Outcome Evaluation: OT eval completed. pt is alert and oriented x4, with verbal cues for year and facility. Pt with min word finding difficulties. Pt is agreeable and motivated to work with therapy. Pt was independent for supine to sit. Pt was CGA for t/f and functional mobility with RW and verbal cues. Pt was supervision with donning/doffing socks with verbal and tactile cues for understanding. Pt with decreased processing and sequencing of tasks at times. Pt would benefit from skilled OT to address adls, functional mobility and education. Pt lives alone but states daugther has been staying with her more and son is involved. Son is present and reports family could stay with her at d/c. Educated on home safety and AE/AD use. OT recommends continuing with RW use at this time for increased safety rather than quad cane, pt agrees with this recommendation. Recommended d/c home with 24/7 assist and HH.

## 2022-07-06 NOTE — PLAN OF CARE
Problem: Adult Inpatient Plan of Care  Goal: Plan of Care Review  Outcome: Ongoing, Progressing  Flowsheets (Taken 7/6/2022 9359)  Progress: no change  Plan of Care Reviewed With: patient  Outcome Evaluation: Pt is alert. Orientation level varies. At times she is oriented to person only, others she is fully oriented. Some aphasia noted at times. Last NIH 2- mild aphasia and confusion. No skin issues. Turns self in bed. Up x2 to BSC - at times steady, other times very unsteady and weak. Daughter at bedside. SCDs. Pt removing . Tele in place. Accucheks all under 140. VSS. Call light in reach. Safety maintained.

## 2022-07-06 NOTE — ED NOTES
Daughter states that the patient got out to go to bed and she couldn't get her words together. When she laid down, she was talking out of her head. Family states she fell a couple days ago and possibly hit the back of a loveseat with the front of her head.

## 2022-07-06 NOTE — ED PROVIDER NOTES
Subjective   Patient is brought emergency room by ambulance with a family reporting that she had the sudden development of altered mental status.  Daughter says that she was sitting in her chair and had been normal today and then got up to go to bed and suddenly started talking out of her head and matters that did not make sense and things that did not make sense.  Eventually she called the patient's daughter-in-law and they talked about the possibility of stroke but the patient had no focal weaknesses just the confusion.  They called an MS and have her brought here to be checked out.  Daughter says the patient had a similar episode several months ago and was felt to be just due to low blood pressure and they adjusted her medications.  She did have a fall about 8 days ago and fell forward and hit her forehead on the back of the loveseat but it seemed fine after that with no problems until today.      History provided by:  Patient   used: No    Altered Mental Status  Presenting symptoms: confusion    Severity:  Mild  Most recent episode:  Today  Episode history:  Single  Duration:  2 hours  Timing:  Constant  Progression:  Unchanged  Chronicity:  New  Context: not alcohol use, not dementia, not homeless, taking medications as prescribed, not recent change in medication, not recent illness and not recent infection    Associated symptoms: no abdominal pain, no decreased appetite, no depression, no eye deviation, no fever, no light-headedness, no nausea, no rash, no seizures, no visual change and no vomiting        Review of Systems   Constitutional: Negative.  Negative for decreased appetite and fever.   HENT: Negative.    Respiratory: Negative.    Cardiovascular: Negative.    Gastrointestinal: Negative.  Negative for abdominal pain, nausea and vomiting.   Genitourinary: Negative.    Musculoskeletal: Negative.    Skin: Negative.  Negative for rash.   Neurological: Negative.  Negative for seizures  and light-headedness.   Psychiatric/Behavioral: Positive for confusion.   All other systems reviewed and are negative.      No past medical history on file.    No Known Allergies    No past surgical history on file.    No family history on file.    Social History     Socioeconomic History   • Marital status:        Prior to Admission medications    Medication Sig Start Date End Date Taking? Authorizing Provider   Mirabegron ER (MYRBETRIQ) 50 MG tablet sustained-release 24 hour Take 50 mg by mouth Daily. 11/9/16   Elliott Land MD       Medications   sodium chloride 0.9 % flush 10 mL (has no administration in time range)       Vitals:    07/05/22 2050   BP: 130/64   Pulse: 82   Resp:    Temp:    SpO2: 94%         Objective   Physical Exam  Vitals and nursing note reviewed.   Constitutional:       Appearance: She is well-developed.   HENT:      Head: Normocephalic and atraumatic.   Eyes:      General: No visual field deficit.     Extraocular Movements: Extraocular movements intact.      Pupils: Pupils are equal, round, and reactive to light.   Cardiovascular:      Rate and Rhythm: Normal rate and regular rhythm.   Pulmonary:      Effort: Pulmonary effort is normal.      Breath sounds: Normal breath sounds.   Abdominal:      General: Bowel sounds are normal.      Palpations: Abdomen is soft.   Musculoskeletal:         General: Normal range of motion.      Cervical back: Normal range of motion and neck supple.   Skin:     General: Skin is warm and dry.   Neurological:      Mental Status: She is alert. She is confused.      Cranial Nerves: No cranial nerve deficit, dysarthria or facial asymmetry.   Psychiatric:         Mood and Affect: Mood normal.         Behavior: Behavior normal.         Procedures         Lab Results (last 24 hours)     Procedure Component Value Units Date/Time    Blood Culture - Blood, Wrist, Left [[id number]] Collected: 07/05/22 2015    Specimen: Blood from Wrist, Left Updated: 07/05/22  2048    CBC & Differential [[id number]]  (Normal) Collected: 07/05/22 2020    Specimen: Blood Updated: 07/05/22 2042    Narrative:      The following orders were created for panel order CBC & Differential.  Procedure                               Abnormality         Status                     ---------                               -----------         ------                     CBC Auto Differential[[id number]]        Normal              Final result                 Please view results for these tests on the individual orders.    Comprehensive Metabolic Panel [[id number]]  (Abnormal) Collected: 07/05/22 2020    Specimen: Blood Updated: 07/05/22 2059     Glucose 129 mg/dL      BUN 25 mg/dL      Creatinine 0.78 mg/dL      Sodium 138 mmol/L      Potassium 4.1 mmol/L      Comment: Slight hemolysis detected by analyzer. Results may be affected.        Chloride 102 mmol/L      CO2 26.0 mmol/L      Calcium 9.4 mg/dL      Total Protein 6.2 g/dL      Albumin 3.60 g/dL      ALT (SGPT) 10 U/L      AST (SGOT) 14 U/L      Alkaline Phosphatase 85 U/L      Total Bilirubin 0.2 mg/dL      Globulin 2.6 gm/dL      A/G Ratio 1.4 g/dL      BUN/Creatinine Ratio 32.1     Anion Gap 10.0 mmol/L      eGFR 75.9 mL/min/1.73      Comment: National Kidney Foundation and American Society of Nephrology (ASN) Task Force recommended calculation based on the Chronic Kidney Disease Epidemiology Collaboration (CKD-EPI) equation refit without adjustment for race.       Narrative:      GFR Normal >60  Chronic Kidney Disease <60  Kidney Failure <15      Blood Culture - Blood, Arm, Left [[id number]] Collected: 07/05/22 2020    Specimen: Blood from Arm, Left Updated: 07/05/22 2048    Lactic Acid, Plasma [[id number]]  (Normal) Collected: 07/05/22 2020    Specimen: Blood Updated: 07/05/22 2057     Lactate 1.6 mmol/L     aPTT [[id number]]  (Normal) Collected: 07/05/22 2020    Specimen: Blood Updated: 07/05/22 2051     PTT 31.6 seconds     Protime-INR [[id number]]   (Abnormal) Collected: 07/05/22 2020    Specimen: Blood Updated: 07/05/22 2051     Protime 14.6 Seconds      INR 1.18    CBC Auto Differential [[id number]]  (Normal) Collected: 07/05/22 2020    Specimen: Blood Updated: 07/05/22 2042     WBC 5.54 10*3/mm3      RBC 4.48 10*6/mm3      Hemoglobin 13.3 g/dL      Hematocrit 40.9 %      MCV 91.3 fL      MCH 29.7 pg      MCHC 32.5 g/dL      RDW 13.8 %      RDW-SD 46.5 fl      MPV 9.6 fL      Platelets 314 10*3/mm3      Neutrophil % 54.4 %      Lymphocyte % 30.7 %      Monocyte % 9.7 %      Eosinophil % 4.7 %      Basophil % 0.5 %      Immature Grans % 0.0 %      Neutrophils, Absolute 3.01 10*3/mm3      Lymphocytes, Absolute 1.70 10*3/mm3      Monocytes, Absolute 0.54 10*3/mm3      Eosinophils, Absolute 0.26 10*3/mm3      Basophils, Absolute 0.03 10*3/mm3      Immature Grans, Absolute 0.00 10*3/mm3      nRBC 0.0 /100 WBC     Urinalysis With Culture If Indicated - Urine, Clean Catch [[id number]]  (Abnormal) Collected: 07/05/22 2022    Specimen: Urine, Clean Catch Updated: 07/05/22 2101     Color, UA Dark Yellow     Appearance, UA Clear     pH, UA 6.0     Specific Gravity, UA 1.027     Glucose, UA Negative     Ketones, UA Trace     Bilirubin, UA Small (1+)     Blood, UA Negative     Protein, UA Negative     Leuk Esterase, UA Trace     Nitrite, UA Negative     Urobilinogen, UA 1.0 E.U./dL    Narrative:      In absence of clinical symptoms, the presence of pyuria, bacteria, and/or nitrites on the urinalysis result does not correlate with infection.    Urinalysis, Microscopic Only - Urine, Clean Catch [[id number]]  (Abnormal) Collected: 07/05/22 2022    Specimen: Urine, Clean Catch Updated: 07/05/22 2101     RBC, UA 0-2 /HPF      WBC, UA 3-5 /HPF      Comment: Urine culture not indicated.        Bacteria, UA Trace /HPF      Squamous Epithelial Cells, UA 3-6 /HPF      Yeast, UA Small/1+ Budding Yeast /HPF      Hyaline Casts, UA None Seen /LPF      Granular Casts, UA 0-2 /LPF       "Mucus, UA Trace /HPF      WBC Clumps, UA Small/1+ /HPF      Methodology Manual Light Microscopy          XR Chest 1 View   Final Result   1. No convincing acute radiographic cardiopulmonary process.   This report was finalized on 07/05/2022 21:34 by Dr. Veronica Montalvo MD.      CT Head Without Contrast   Final Result   1. 2.1 cm calcified extra-axial lesion along the left cervical cranial   junction suggestive for calcified meningioma. Mild to moderate   generalized volume loss with chronic small vessel ischemic change. No   acute intracranial process identified.   This report was finalized on 07/05/2022 20:59 by Dr. Veronica Montalvo MD.          ED Course  ED Course as of 07/05/22 2150 Tue Jul 05, 2022 2139 Patient does seem improved now.  She does know she is in the hospital for testing but did not really know why.  When I first spoke to her and ask her how she was feeling her first response was \"I want some more than peanut rolls but I told me I did not need anymore\".  So she is still confused somewhat.  We will admit for further evaluation. [TR]      ED Course User Index  [TR] Ace Vance Jr., MD          MDM  Number of Diagnoses or Management Options  Altered mental status, unspecified altered mental status type: new and requires workup     Amount and/or Complexity of Data Reviewed  Clinical lab tests: ordered and reviewed  Tests in the radiology section of CPT®: ordered and reviewed  Tests in the medicine section of CPT®: ordered and reviewed  Discuss the patient with other providers: yes    Risk of Complications, Morbidity, and/or Mortality  Presenting problems: moderate  Diagnostic procedures: moderate  Management options: moderate    Patient Progress  Patient progress: stable      Final diagnoses:   Altered mental status, unspecified altered mental status type          Ace Vance Jr., MD  07/05/22 2150    "

## 2022-07-06 NOTE — PROGRESS NOTES
[facility] Medicine Services  INPATIENT PROGRESS NOTE    Patient Name: Debra Nrei  Date of Admission: 7/5/2022  Today's Date: 07/06/22  Length of Stay: 1  Primary Care Physician: Jennifer Vidal MD    Subjective   Chief Complaint: Follow-up altered mental status  HPI   Up in bed with no family at bedside.  She is able to answer orientation questions appropriately.  Follows simple commands.  Denies shortness of breath, chest pain or pressure.  Denies nausea, vomiting or abdominal pain.    SLP at bedside performing cognitive evaluation.    Review of Systems   All pertinent negatives and positives are as above. All other systems have been reviewed and are negative unless otherwise stated.     Objective    Temp:  [97.5 °F (36.4 °C)-98.7 °F (37.1 °C)] 97.5 °F (36.4 °C)  Heart Rate:  [69-88] 80  Resp:  [16-20] 18  BP: ()/(51-89) 112/86  Physical Exam  Vitals reviewed.   Constitutional:       General: She is not in acute distress.     Appearance: She is not toxic-appearing.      Comments: Up in bed.  No acute distress.  Tolerating room air.  No family at bedside.   HENT:      Head: Normocephalic and atraumatic.      Mouth/Throat:      Mouth: Mucous membranes are moist.      Pharynx: Oropharynx is clear.   Eyes:      Extraocular Movements: Extraocular movements intact.      Conjunctiva/sclera: Conjunctivae normal.      Pupils: Pupils are equal, round, and reactive to light.   Cardiovascular:      Rate and Rhythm: Normal rate and regular rhythm.      Pulses: Normal pulses.   Pulmonary:      Effort: Pulmonary effort is normal. No respiratory distress.      Breath sounds: Normal breath sounds. No wheezing.   Abdominal:      General: Bowel sounds are normal. There is no distension.      Palpations: Abdomen is soft.      Tenderness: There is no abdominal tenderness.   Musculoskeletal:         General: No swelling or tenderness. Normal range of motion.      Cervical  back: Normal range of motion and neck supple. No muscular tenderness.   Skin:     General: Skin is warm and dry.      Findings: No erythema or rash.   Neurological:      General: No focal deficit present.      Mental Status: She is alert and oriented to person, place, and time.      Cranial Nerves: No cranial nerve deficit.      Motor: No weakness.   Psychiatric:         Mood and Affect: Mood normal.         Behavior: Behavior normal.         Results Review:  I have reviewed the labs, radiology results, and diagnostic studies.    Laboratory Data:   Results from last 7 days   Lab Units 07/06/22 0448 07/05/22 2020   WBC 10*3/mm3 5.47 5.54   HEMOGLOBIN g/dL 12.8 13.3   HEMATOCRIT % 40.2 40.9   PLATELETS 10*3/mm3 291 314        Results from last 7 days   Lab Units 07/06/22 0448 07/05/22 2020   SODIUM mmol/L 137 138   POTASSIUM mmol/L 3.7 4.1   CHLORIDE mmol/L 102 102   CO2 mmol/L 26.0 26.0   BUN mg/dL 22 25*   CREATININE mg/dL 0.58 0.78   CALCIUM mg/dL 9.2 9.4   BILIRUBIN mg/dL  --  0.2   ALK PHOS U/L  --  85   ALT (SGPT) U/L  --  10   AST (SGOT) U/L  --  14   GLUCOSE mg/dL 97 129*       Culture Data:   No results found for: ACANTHNAEG, AFBCX, BPERTUSSISCX, BLOODCX  No results found for: BCIDPCR, CXREFLEX, CSFCX, CULTURETIS  No results found for: CULTURES, HSVCX, URCX  No results found for: EYECULTURE, GCCX, HSVCULTURE, LABHSV  No results found for: LEGIONELLA, MRSACX, MUMPSCX, MYCOPLASCX  No results found for: NOCARDIACX, STOOLCX  No results found for: THROATCX, UNSTIMCULT, URINECX, CULTURE, VZVCULTUR  No results found for: VIRALCULTU, WOUNDCX    Radiology Data:   Imaging Results (Last 24 Hours)     Procedure Component Value Units Date/Time    US Carotid Bilateral [[id number]] Resulted: 07/06/22 0803     Updated: 07/06/22 0820    MRI Brain Without Contrast [[id number]] Collected: 07/06/22 0806     Updated: 07/06/22 0817    Narrative:      Indication: Altered mental status        Technique: Multisequence, multiplanar  MRI of the brain without contrast.     Comparison: CT scan dated 7/5/2022     Findings:      No diffusion signal abnormality. No intra-axial or extra-axial  hemorrhage. 1.6 cm extra-axial dural based, densely calcified mass at  the foramen magnum along its left lateral aspect (series 7-image 4 and  series 8-image 4). There does not appear to be a significant mass effect  on the cervicomedullary junction. Mild to moderate global cortical  atrophy. Slight ventricle prominence as a result of the parenchymal  volume loss. Symmetric volume loss in the hippocampal formations.  Posterior fossa structures are unremarkable. Pituitary gland and sella  are unremarkable. The major intracranial flow-voids are preserved.  Orbital contents are unremarkable. The paranasal sinuses are clear.  Mastoid air cells are clear. Normal bone marrow signal.        Impression:      Impression:     1. 1.6 cm densely calcified meningioma left of midline at the foramen  magnum. There is only a slight mass effect on the cord at the cervical  medullary junction.  2. Global cortical atrophy with slight ventricle prominence. Symmetric  volume loss in the hippocampal formations as well.  This report was finalized on 07/06/2022 08:14 by Dr Elia Underwood, .    XR Chest 1 View [[id number]] Collected: 07/05/22 2133     Updated: 07/05/22 2137    Narrative:      HISTORY: Weakness     CXR: A frontal view the chest is obtained     COMPARISON: 8/12/2011     FINDINGS: Skinfolds of the right lower hemithorax. Cardiomediastinal  contours remain normal. No acute consolidation or edema. Small calcified  granuloma. No pleural effusion pneumothorax. Thoracic aortic  calcification. Right curvature of the degenerative thoracolumbar spine.  Arthritic changes of the shoulders.       Impression:      1. No convincing acute radiographic cardiopulmonary process.  This report was finalized on 07/05/2022 21:34 by Dr. Veronica Montalvo MD.    CT Head Without Contrast [[id number]]  Collected: 07/05/22 2055     Updated: 07/05/22 2102    Narrative:      CT HEAD WO CONTRAST- 7/5/2022 8:41 PM CDT     HISTORY: Mental status change, unknown cause      COMPARISON: None     DOSE LENGTH PRODUCT: 728 mGy cm. Automated exposure control was also  utilized to decrease patient radiation dose.     TECHNIQUE: Axial images of the brain are obtained without IV contrast     FINDINGS:  There is a calcified 2.1 x 1.3 x 1.7 cm extra-axial lesion at  the left cervical cranial junction adjacent the medulla may represent a  calcified meningioma. There is mild to moderate generalized volume loss  with mild chronic small vessel ischemia. No intracranial hemorrhage or  mass effect. No acute signs of ischemia. No extra-axial hematoma or  subarachnoid hemorrhage.     The visible paranasal sinuses and mastoid air cells are well-aerated.  Bony calvarium appears intact.       Impression:      1. 2.1 cm calcified extra-axial lesion along the left cervical cranial  junction suggestive for calcified meningioma. Mild to moderate  generalized volume loss with chronic small vessel ischemic change. No  acute intracranial process identified.  This report was finalized on 07/05/2022 20:59 by Dr. Veronica Montalvo MD.          I have reviewed the patient's current medications.     Assessment/Plan     Active Hospital Problems    Diagnosis    • Altered mental status    • At risk for polypharmacy    • Chronic pain syndrome    • Anxiety disorder        Plan:  Patient presented on 7/5 for sudden onset altered mental status.  Reportedly fell about 8 days ago hitting her head.  CT head negative for acute intracranial normality.  Chest x-ray stable.    Neurology evaluating patient.  MRI brain negative for stroke.  Neurology recommends EEG.    Carotid ultrasound reviewed.    Recommend discontinuation of Xanax, Ambien, hydrocodone, hydroxyzine as could cause cognitive impairing effects.    PT/OT.  SLP for cognitive evaluation.    SCDs for DVT  prophylaxis.    Discharge Planning: I expect the patient to be discharged to home possibly tomorrow.    Electronically signed by TATO Kidd, 07/06/22, 12:57 CDT.

## 2022-07-06 NOTE — PLAN OF CARE
Goal Outcome Evaluation:  Plan of Care Reviewed With: patient        Progress: no change (eval)  Outcome Evaluation: ST completed standardized cognitive assessment. Cognitive Linguistic Quick Test (CLQT) administered and pt was alert and sitting upright in bed for eval. Pt stated that her dtr lives with her and assist her with higher functioning tasks including: cooking, cleaning, finances, and medication management. Pt is oriented to name and month independently but required cues to state appropriate place and month. Breakdowns noted at conversation level when pt is attempting to convey more complex messages. Pt often required repetition of directions in order to follow through with task and despite repetition of directions at times, pt required directions to be stated in a different way. Pt noted to have difficulty with providing personal information and stating home address but was able to accurately state given time.  Pt cued to utilize pen to complete task and had difficulty figuring out how to use pen initially. Pt unable to appropriately complete symbol cancellation task, symbol trails, mazes, and design generation.     Pt scored in severe category in domains of attention, executive function, visuospatial, and clock drawing. Pt scored moderate severity rating for domains of memory and language. Pt scored at the low end of scale for memory task. Overall severity rating is severe. Pt would benefit from ongoing tx and would require frequent supervision upon d/c from current facility. ST to follow and treat.

## 2022-07-06 NOTE — THERAPY EVALUATION
Acute Care - Speech Language Pathology Initial Evaluation  [facility]     Patient Name: Debra Neri  : 1940  MRN: [id number]  Today's Date: 2022               Admit Date: 2022   ST completed standardized cognitive assessment. Cognitive Linguistic Quick Test (CLQT) administered and pt was alert and sitting upright in bed for eval. Pt stated that her dtr lives with her and assist her with higher functioning tasks including: cooking, cleaning, finances, and medication management. Pt is oriented to name and month independently but required cues to state appropriate place and month. Breakdowns noted at conversation level when pt is attempting to convey more complex messages. Pt often required repetition of directions in order to follow through with task and despite repetition of directions at times, pt required directions to be stated in a different way. Pt noted to have difficulty with providing personal information and stating home address but was able to accurately state given time.  Pt cued to utilize pen to complete task and had difficulty figuring out how to use pen initially. Pt unable to appropriately complete symbol cancellation task, symbol trails, mazes, and design generation.     Pt scored in severe category in domains of attention, executive function, visuospatial, and clock drawing. Pt scored moderate severity rating for domains of memory and language. Pt scored at the low end of scale for memory task. Overall severity rating is severe. Pt would benefit from ongoing tx and would require frequent supervision upon d/c from current facility. ST to follow and treat.  Mima Hernandez MS-CCC/SLP, CNT 2022 14:55 CDT      Visit Dx:    ICD-10-CM ICD-9-CM   1. Altered mental status, unspecified altered mental status type  R41.82 780.97   2. Cognitive and behavioral changes  R41.89 799.59    R46.89 312.9     Patient Active Problem List   Diagnosis   • Altered mental status   • At risk for  polypharmacy   • Chronic pain syndrome   • Anxiety disorder   • Altered mental status, unspecified altered mental status type     Past Medical History:   Diagnosis Date   • Anxiety    • Chronic back pain    • Hypertension    • Osteoarthritis    • Panic attacks    • Rectal carcinoid tumor 12/01/2014   • Seasonal allergies    • Urinary retention    • UTI (urinary tract infection)      Past Surgical History:   Procedure Laterality Date   • APPENDECTOMY     • BREAST BIOPSY      x 2   • CATARACT EXTRACTION Bilateral    • CHOLECYSTECTOMY     • COLONOSCOPY      2011, 2014   • REPLACEMENT TOTAL KNEE         SLP Recommendation and Plan  SLP Diagnosis: severe cognitive deficit (07/06/22 1245)        SLC Criteria for Skilled Therapy Interventions Met: yes (07/06/22 1245)  Anticipated Discharge Disposition (SLP): unknown (07/06/22 1245)        Predicted Duration Therapy Intervention (Days): until discharge (07/06/22 1245)                    Plan of Care Reviewed With: patient (07/06/22 1433)  Progress: no change (eval) (07/06/22 1433)  Outcome Evaluation: ST completed standardized cognitive assessment. Cognitive Linguistic Quick Test (CLQT) administered and pt was alert and sitting upright in bed for eval. Pt stated that her dtr lives with her and assist her with higher functioning tasks including: cooking, cleaning, finances, and medication management. Pt is oriented to name and month independently but required cues to state appropriate place and month. Breakdowns noted at conversation level when pt is attempting to convey more complex messages. Pt often required repetition of directions in order to follow through with task and despite repetition of directions at times, pt required directions to be stated in a different way. Pt noted to have difficulty with providing personal information and stating home address but was able to accurately state given time.  Pt cued to utilize pen to complete task and had difficulty figuring  out how to use pen initially. Pt unable to appropriately complete symbol cancellation task, symbol trails, mazes, and design generation. Pt scored in severe category in domains of attention, executive function, visuospatial, and clock drawing. Pt scored moderate severity rating for domains of memory and language. Pt scored at the low end of scale for memory task. Overall severity rating is severe. Pt would benefit from ongoing tx and would require frequent supervision upon d/c from current facility. ST to follow and treat. (07/06/22 2012)      SLP EVALUATION (last 72 hours)     SLP SLC Evaluation     Row Name 07/06/22 1243                   Communication Assessment/Intervention    Document Type evaluation  -BN        Subjective Information no complaints  -BN        Patient Observations alert;cooperative  -BN        Patient/Family/Caregiver Comments/Observations pt son arrived at end of eval  -BN        Patient Effort good  -BN                  General Information    Patient Profile Reviewed yes  -BN        Pertinent History Of Current Problem acute AMS. Hx of HTN and UTI  -BN        Precautions/Limitations, Vision WFL with corrective lenses  -BN        Precautions/Limitations, Hearing WFL;for purposes of eval  -BN        Prior Level of Function-Communication unknown  -BN        Plans/Goals Discussed with patient and family  -BN        Barriers to Rehab cognitive status  -BN        Patient's Goals for Discharge patient did not state  -BN        Family Goals for Discharge family did not state  -BN        Standardized Assessment Used CLQT  -BN                  Pain    Additional Documentation Pain Scale: FACES Pre/Post-Treatment (Group)  -BN                  Pain Scale: FACES Pre/Post-Treatment    Pain: FACES Scale, Pretreatment 0-->no hurt  -BN        Posttreatment Pain Rating 0-->no hurt  -BN                  Standardized Tests    Cognitive/Memory Tests CLQT: Cognitive Linguistic Quick Test  -BN                  CLQT  (The Cognitive Linguistic Quick Test)    Attention Domain Score 11  -BN        Attention Severity Rating 1: Severe  -BN        Memory Domain Score 82  -BN        Memory Severity Rating 2: Moderate  -BN        Executive Function Domain Score 6  -BN        Executive Function Severity Rating 1: Severe  -BN        Language Domain Score 23  -BN        Language Severity Rating 2: Moderate  -BN        Visuospatial Domain Score 8  -BN        Visuospatial Severity Rating 1: Severe  -BN        Clock Drawing Total Score 5  -BN        Clock Drawing Severity Rating Severe  -BN        Composite Severity Rating 1.4  -BN        Composite Severity Rating Range 1.4 - 1.0: Severe  -BN        CLQT Comments see note  -BN                  SLP Evaluation Clinical Impressions    SLP Diagnosis severe cognitive deficit  -BN        Rehab Potential/Prognosis fair  -BN        SLC Criteria for Skilled Therapy Interventions Met yes  -BN        Functional Impact need frequent supervision;difficulty in expressing complex messages;difficulty completing home management task  -BN                  SLP Treatment Clinical Impressions    Care Plan Review evaluation/treatment results reviewed  -BN        Care Plan Review, Other Participant(s) son  -BN                  Recommendations    Therapy Frequency (SLP SLC) PRN  -BN        Predicted Duration Therapy Intervention (Days) until discharge  -BN        Anticipated Discharge Disposition (SLP) unknown  -BN                  Communication Treatment Objective and Progress Goals (SLP)    Cognitive Linguistic Treatment Objectives Cognitive Linguistic Treatment Objectives (Group)  -BN                  Cognitive Linguistic Treatment Objectives    Orientation Selection orientation, SLP goal 1  -BN        Memory Skills Selection memory skills, SLP goal 1  -BN        Organizational Skills Selection organizational skills, SLP goal 1  -BN                  Orientation Goal 1 (SLP)    Improve Orientation Through Goal 1  (SLP) demonstrating orientation to month;demonstrating orientation to year;demonstrating orientation to place;80%  -BN        Time Frame (Orientation Goal 1, SLP) short term goal (STG);by discharge  -BN        Progress/Outcomes (Orientation Goal 1, SLP) new goal;goal ongoing  -BN                  Memory Skills Goal 1 (SLP)    Improve Memory Skills Through Goal 1 (SLP) recalling related word lists immediately;recalling related word lists with an imposed delay;recalling unrelated word lists immediately;recalling unrelated word lists with an imposed delay;listen to a paragraph and answer questions;visual memory task;80%  -BN        Time Frame (Memory Skills Goal 1, SLP) short term goal (STG);by discharge  -BN        Progress/Outcomes (Memory Skills Goal 1, SLP) new goal;goal ongoing  -BN                  Organizational Skills Goal 1 (SLP)    Improve Thought Organization Through Goal 1 (SLP) completing a divergent naming task;completing a convergent naming task;naming similarities and differences;80%  -BN        Time Frame (Thought Organization Skills Goal 1, SLP) short term goal (STG);by discharge  -BN        Progress/Outcomes (Thought Organization Skills Goal 1, SLP) new goal;goal ongoing  -BN              User Key  (r) = Recorded By, (t) = Taken By, (c) = Cosigned By    Initials Name Effective Dates    Mima Nickerson, MS-CCC/SLP, CNT 06/15/22 -                    EDUCATION  The patient has been educated in the following areas:     Cognitive Impairment Communication Impairment.           SLP GOALS     Row Name 07/06/22 1245             Orientation Goal 1 (SLP)    Improve Orientation Through Goal 1 (SLP) demonstrating orientation to month;demonstrating orientation to year;demonstrating orientation to place;80%  -BN      Time Frame (Orientation Goal 1, SLP) short term goal (STG);by discharge  -BN      Progress/Outcomes (Orientation Goal 1, SLP) new goal;goal ongoing  -BN              Memory Skills Goal 1  (SLP)    Improve Memory Skills Through Goal 1 (SLP) recalling related word lists immediately;recalling related word lists with an imposed delay;recalling unrelated word lists immediately;recalling unrelated word lists with an imposed delay;listen to a paragraph and answer questions;visual memory task;80%  -BN      Time Frame (Memory Skills Goal 1, SLP) short term goal (STG);by discharge  -BN      Progress/Outcomes (Memory Skills Goal 1, SLP) new goal;goal ongoing  -BN              Organizational Skills Goal 1 (SLP)    Improve Thought Organization Through Goal 1 (SLP) completing a divergent naming task;completing a convergent naming task;naming similarities and differences;80%  -BN      Time Frame (Thought Organization Skills Goal 1, SLP) short term goal (STG);by discharge  -BN      Progress/Outcomes (Thought Organization Skills Goal 1, SLP) new goal;goal ongoing  -BN            User Key  (r) = Recorded By, (t) = Taken By, (c) = Cosigned By    Initials Name Provider Type    Mima Nickerson MS-CCC/SLP, [facility] Speech and Language Pathologist                        Time Calculation:      Time Calculation- SLP     Row Name 07/06/22 1452             Time Calculation- SLP    SLP Start Time 1245  -BN      SLP Stop Time 1455  -BN      SLP Time Calculation (min) 130 min  -BN      SLP Received On 07/06/22  -BN      SLP Goal Re-Cert Due Date 07/16/22  -BN              Timed Charges    96125-Standardized cognitive performance testing 130  -BN              Total Minutes    Timed Charges Total Minutes 130  -BN       Total Minutes 130  -BN            User Key  (r) = Recorded By, (t) = Taken By, (c) = Cosigned By    Initials Name Provider Type    Mima Nickerson MS-CCC/SLP, [facility] Speech and Language Pathologist                Therapy Charges for Today     Code Description Service Date Service Provider Modifiers Qty    [id number] HC ST STD COG PERF TEST PER HOUR 7/6/2022 Mima Hernandez MS-CCC/SLP, [facility] GN 3                      Mima Hernandez, MS-CCC/SLP, CNT  7/6/2022

## 2022-07-06 NOTE — CONSULTS
"    Neurology Consultation Note    Referring Provider:   Saurabh Cordova MD, MD    Reason for Consultation:    Altered mental status  Difficulty with speech    Subjective     History of Present Illness:  This is an 82-year-old right-hand-dominant female with a history of hypertension who presented with complaints of confusion.  Her daughter presented her to the emergency room for further evaluation after she demonstrated nonsensical dialogue yesterday morning.  The patient lives with her daughter.  Her daughter states that she was having difficulty following commands and difficulty finding the \"right word.\"  For example, she stated, \"I do not think my legs are working, so I will borrow this other lady's legs to drive.  Her legs will work better than mine.\"  When this lasted for several hours, the patient's daughter became concerned primary present for further evaluation.    The patient also reportedly had a fall striking her head 8 days ago, however, CT of the head is negative for acute intracranial abnormality.  She reports that she has had frequent falls over the last year, however, have been better since recently being hospitalized back in April with adjustment in her blood pressure medications.    MRI of the brain demonstrates significant frontotemporal atrophy with minimal white matter changes in the periventricular space, however, no diffusion weighted restriction abnormalities or acute stroke.    She has no significant metabolic abnormalities on laboratory testing.    She does utilize Xanax 2.5 mg nightly as needed for sleep.  She also is on hydrocodone, Cymbalta, hydroxyzine, Myrbetriq and Ambien-all of which can be eventually sedating and impair cognition.      Past Medical History:   Diagnosis Date   • Anxiety    • Chronic back pain    • Hypertension    • Osteoarthritis    • Panic attacks    • Rectal carcinoid tumor 12/01/2014   • Seasonal allergies    • Urinary retention    • UTI (urinary tract infection)  "       Allergies   Allergen Reactions   • Pollen Extract Other (See Comments)     Nasal congestion     No current facility-administered medications on file prior to encounter.     Current Outpatient Medications on File Prior to Encounter   Medication Sig   • albuterol sulfate  (90 Base) MCG/ACT inhaler Inhale 2 puffs Every 6 (Six) Hours As Needed for Wheezing.   • ALPRAZolam (XANAX) 0.5 MG tablet Take 0.5 mg by mouth At Night As Needed for Sleep.   • amLODIPine (NORVASC) 10 MG tablet Take 10 mg by mouth Daily.   • atorvastatin (LIPITOR) 40 MG tablet Take 40 mg by mouth Daily.   • calcium carbonate (OS-JUAN CARLOS) 600 MG tablet Take 600 mg by mouth Daily.   • DULoxetine (CYMBALTA) 60 MG capsule Take 60 mg by mouth Daily.   • fluticasone (FLONASE) 50 MCG/ACT nasal spray 2 sprays into the nostril(s) as directed by provider Daily.   • gabapentin (NEURONTIN) 100 MG capsule Take 100 mg by mouth 2 (Two) Times a Day As Needed.   • hydroCHLOROthiazide (HYDRODIURIL) 25 MG tablet Take 25 mg by mouth Daily.   • HYDROcodone-acetaminophen (NORCO) 7.5-325 MG per tablet Take 1 tablet by mouth 3 (Three) Times a Day As Needed for Moderate Pain .   • hydrOXYzine (ATARAX) 50 MG tablet Take 50 mg by mouth At Night As Needed (sleep).   • losartan (COZAAR) 100 MG tablet Take 100 mg by mouth Daily.   • Mirabegron ER (MYRBETRIQ) 50 MG tablet sustained-release 24 hour Take 50 mg by mouth Daily.   • PEDIATRIC MULTIVITAMINS-FL PO Take 1 tablet by mouth Daily.   • verapamil SR (CALAN-SR) 240 MG CR tablet Take 240 mg by mouth Every Night.   • Vitamin D, Cholecalciferol, 50 MCG (2000 UT) capsule Take 2,000 Units by mouth Daily.   • zolpidem (Ambien) 10 MG tablet Take 10 mg by mouth Every Night.       Social History     Socioeconomic History   • Marital status:    Tobacco Use   • Smoking status: Former Smoker   Substance and Sexual Activity   • Alcohol use: Never     Family History   Problem Relation Age of Onset   • Heart disease Mother     • COPD Mother    • Hypertension Father        Review of Systems  A 14 point review of systems was reviewed and was negative.    Objective      Vital Signs  Temp:  [97.5 °F (36.4 °C)-98.7 °F (37.1 °C)] 97.5 °F (36.4 °C)  Heart Rate:  [69-88] 69  Resp:  [16-20] 18  BP: ()/(51-89) 131/62    General Exam:  Head:  Normal cephalic, atraumatic  HEENT:  Neck supple  Fundoscopic Exam:  No signs of disc edema  CVS:  Regular rate and rhythm.  No murmurs  Carotid Examination:  No bruits  Lungs:  Clear to auscultation  Abdomen:  Non-tender, Non-distended  Extremities:  No signs of peripheral edema  Skin:  No rashes      Neurologic Exam:  Mental Status:    -Awake. Alert. Oriented to person, place & time.  -No word finding difficulties.  -No aphasia.  -No dysarthria.  -Follows simple commands.     CN II:  Full visual fields with confrontation.  Pupils equally reactive to light.  CN III, IV, VI:  Extraocular muscles function intact with no nystagmus.  CN V:  Facial sensory is symmetric.  CN VII:  Facial motor symmetric.  CN VIII:  Gross hearing intact bilaterally.  CN IX/X:  Palate elevates symmetrically.  CN XI:  Shoulder shrug symmetric.  CN XII:  Tongue is midline on protrusion.     Motor: (strength out of 5:  1= minimal movement, 2 = movement in plane of gravity, 3 = movement against gravity, 4 = movement against some resistance, 5 = full strength)     -Right Upper Ext: Proximal: 5 Distal: 5  -Left Upper Ext: Proximal: 5 Distal: 5    -Right Lower Ext: Proximal: 5 Distal: 5  -Left Lower Ext: Proximal: 5 Distal: 5     Reflexes:  No abnormal glabellar, pucker, snout or palmomental reflexes.    -Right              Biceps: 2+         Triceps: 2+      Brachioradialis: 2+              Patella: 2+       Ankle: 2+         Babinski:  negative  -Left              Biceps: 2+         Triceps: 2+      Brachioradialis: 2+              Patella: 2+       Ankle: 2+         Babinski:  negative    Tone (Modified Bobby Scale):  No  appreciable increase in tone or rigidity noted.     Sensory:  -Intact to light touch, pinprick BUE (C5-T1) and BLE (L2-S1).     Coordination:  -Finger to nose intact BUEs  -Heel to shin intact BLEs  -No ataxia     Gait  -No signs of ataxia  -ambulates unassisted    Results Review:  Lab Results (last 24 hours)     Procedure Component Value Units Date/Time    Hemoglobin A1c [[id number]]  (Normal) Collected: 07/06/22 0448    Specimen: Blood Updated: 07/06/22 0613     Hemoglobin A1C 5.50 %     Narrative:      Hemoglobin A1C Ranges:    Increased Risk for Diabetes  5.7% to 6.4%  Diabetes                     >= 6.5%  Diabetic Goal                < 7.0%    Lipid Panel [[id number]] Collected: 07/06/22 0448    Specimen: Blood Updated: 07/06/22 0610     Total Cholesterol 138 mg/dL      Triglycerides 57 mg/dL      HDL Cholesterol 57 mg/dL      LDL Cholesterol  69 mg/dL      VLDL Cholesterol 12 mg/dL      LDL/HDL Ratio 1.22    Narrative:      Cholesterol Reference Ranges  (U.S. Department of Health and Human Services ATP III Classifications)    Desirable          <200 mg/dL  Borderline High    200-239 mg/dL  High Risk          >240 mg/dL      Triglyceride Reference Ranges  (U.S. Department of Health and Human Services ATP III Classifications)    Normal           <150 mg/dL  Borderline High  150-199 mg/dL  High             200-499 mg/dL  Very High        >500 mg/dL    HDL Reference Ranges  (U.S. Department of Health and Human Services ATP III Classifications)    Low     <40 mg/dl (major risk factor for CHD)  High    >60 mg/dl ('negative' risk factor for CHD)        LDL Reference Ranges  (U.S. Department of Health and Human Services ATP III Classifications)    Optimal          <100 mg/dL  Near Optimal     100-129 mg/dL  Borderline High  130-159 mg/dL  High             160-189 mg/dL  Very High        >189 mg/dL    Basic Metabolic Panel [[id number]]  (Abnormal) Collected: 07/06/22 0448    Specimen: Blood Updated: 07/06/22 0610      Glucose 97 mg/dL      BUN 22 mg/dL      Creatinine 0.58 mg/dL      Sodium 137 mmol/L      Potassium 3.7 mmol/L      Chloride 102 mmol/L      CO2 26.0 mmol/L      Calcium 9.2 mg/dL      BUN/Creatinine Ratio 37.9     Anion Gap 9.0 mmol/L      eGFR 90.5 mL/min/1.73      Comment: National Kidney Foundation and American Society of Nephrology (ASN) Task Force recommended calculation based on the Chronic Kidney Disease Epidemiology Collaboration (CKD-EPI) equation refit without adjustment for race.       Narrative:      GFR Normal >60  Chronic Kidney Disease <60  Kidney Failure <15      CBC (No Diff) [[id number]]  (Normal) Collected: 07/06/22 0448    Specimen: Blood Updated: 07/06/22 0551     WBC 5.47 10*3/mm3      RBC 4.39 10*6/mm3      Hemoglobin 12.8 g/dL      Hematocrit 40.2 %      MCV 91.6 fL      MCH 29.2 pg      MCHC 31.8 g/dL      RDW 13.8 %      RDW-SD 46.4 fl      MPV 10.2 fL      Platelets 291 10*3/mm3     POC Glucose Once [[id number]]  (Normal) Collected: 07/06/22 0044    Specimen: Blood Updated: 07/06/22 0055     Glucose 107 mg/dL      Comment: : [id number] Louie SebmoisesianMeter ID: [id number]       COVID PRE-OP / PRE-PROCEDURE SCREENING ORDER (NO ISOLATION) - Swab, Nasal Cavity [[id number]]  (Normal) Collected: 07/05/22 2211    Specimen: Swab from Nasal Cavity Updated: 07/05/22 2303    Narrative:      The following orders were created for panel order COVID PRE-OP / PRE-PROCEDURE SCREENING ORDER (NO ISOLATION) - Swab, Nasal Cavity.  Procedure                               Abnormality         Status                     ---------                               -----------         ------                     COVID-19,[facility]-RIG[facility]...[[id number]]  Normal              Final result                 Please view results for these tests on the individual orders.    COVID-19,[facility] IN-HOUSE,Nasal Swab No Transport Media 3-4 HR TAT - Swab, Nasal Cavity [[id number]]  (Normal) Collected: 07/05/22 2211    Specimen: Swab  from Nasal Cavity Updated: 07/05/22 2303     COVID19 Not Detected    Narrative:      Fact sheet for providers: https://www.fda.gov/media/210840/download     Fact sheet for patients: https://www.fda.gov/media/906738/download    Test performed by PCR.    Consider negative results in combination with clinical observations, patient history, and epidemiological information.    Urine Drug Screen - Urine, Clean Catch [[id number]]  (Abnormal) Collected: 07/05/22 2022    Specimen: Urine, Clean Catch Updated: 07/05/22 2259     THC, Screen, Urine Negative     Phencyclidine (PCP), Urine Negative     Cocaine Screen, Urine Negative     Methamphetamine, Ur Negative     Opiate Screen Positive     Amphetamine Screen, Urine Negative     Benzodiazepine Screen, Urine Positive     Tricyclic Antidepressants Screen Negative     Methadone Screen, Urine Negative     Barbiturates Screen, Urine Negative     Oxycodone Screen, Urine Negative     Propoxyphene Screen Negative     Buprenorphine, Screen, Urine Negative    Narrative:      Cutoff For Drugs Screened:    Amphetamines               500 ng/ml  Barbiturates               200 ng/ml  Benzodiazepines            150 ng/ml  Cocaine                    150 ng/ml  Methadone                  200 ng/ml  Opiates                    100 ng/ml  Phencyclidine               25 ng/ml  THC                            50 ng/ml  Methamphetamine            500 ng/ml  Tricyclic Antidepressants  300 ng/ml  Oxycodone                  100 ng/ml  Propoxyphene               300 ng/ml  Buprenorphine               10 ng/ml    The normal value for all drugs tested is negative. This report includes unconfirmed screening results, with the cutoff values listed, to be used for medical treatment purposes only.  Unconfirmed results must not be used for non-medical purposes such as employment or legal testing.  Clinical consideration should be applied to any drug of abuse test, particularly when unconfirmed results are used.       Urinalysis, Microscopic Only - Urine, Clean Catch [[id number]]  (Abnormal) Collected: 07/05/22 2022    Specimen: Urine, Clean Catch Updated: 07/05/22 2101     RBC, UA 0-2 /HPF      WBC, UA 3-5 /HPF      Comment: Urine culture not indicated.        Bacteria, UA Trace /HPF      Squamous Epithelial Cells, UA 3-6 /HPF      Yeast, UA Small/1+ Budding Yeast /HPF      Hyaline Casts, UA None Seen /LPF      Granular Casts, UA 0-2 /LPF      Mucus, UA Trace /HPF      WBC Clumps, UA Small/1+ /HPF      Methodology Manual Light Microscopy    Urinalysis With Culture If Indicated - Urine, Clean Catch [[id number]]  (Abnormal) Collected: 07/05/22 2022    Specimen: Urine, Clean Catch Updated: 07/05/22 2101     Color, UA Dark Yellow     Appearance, UA Clear     pH, UA 6.0     Specific Gravity, UA 1.027     Glucose, UA Negative     Ketones, UA Trace     Bilirubin, UA Small (1+)     Blood, UA Negative     Protein, UA Negative     Leuk Esterase, UA Trace     Nitrite, UA Negative     Urobilinogen, UA 1.0 E.U./dL    Narrative:      In absence of clinical symptoms, the presence of pyuria, bacteria, and/or nitrites on the urinalysis result does not correlate with infection.    Comprehensive Metabolic Panel [[id number]]  (Abnormal) Collected: 07/05/22 2020    Specimen: Blood Updated: 07/05/22 2059     Glucose 129 mg/dL      BUN 25 mg/dL      Creatinine 0.78 mg/dL      Sodium 138 mmol/L      Potassium 4.1 mmol/L      Comment: Slight hemolysis detected by analyzer. Results may be affected.        Chloride 102 mmol/L      CO2 26.0 mmol/L      Calcium 9.4 mg/dL      Total Protein 6.2 g/dL      Albumin 3.60 g/dL      ALT (SGPT) 10 U/L      AST (SGOT) 14 U/L      Alkaline Phosphatase 85 U/L      Total Bilirubin 0.2 mg/dL      Globulin 2.6 gm/dL      A/G Ratio 1.4 g/dL      BUN/Creatinine Ratio 32.1     Anion Gap 10.0 mmol/L      eGFR 75.9 mL/min/1.73      Comment: National Kidney Foundation and American Society of Nephrology (ASN) Task Force  recommended calculation based on the Chronic Kidney Disease Epidemiology Collaboration (CKD-EPI) equation refit without adjustment for race.       Narrative:      GFR Normal >60  Chronic Kidney Disease <60  Kidney Failure <15      Lactic Acid, Plasma [[id number]]  (Normal) Collected: 07/05/22 2020    Specimen: Blood Updated: 07/05/22 2057     Lactate 1.6 mmol/L     aPTT [[id number]]  (Normal) Collected: 07/05/22 2020    Specimen: Blood Updated: 07/05/22 2051     PTT 31.6 seconds     Protime-INR [[id number]]  (Abnormal) Collected: 07/05/22 2020    Specimen: Blood Updated: 07/05/22 2051     Protime 14.6 Seconds      INR 1.18    Blood Culture - Blood, Arm, Left [[id number]] Collected: 07/05/22 2020    Specimen: Blood from Arm, Left Updated: 07/05/22 2048    Blood Culture - Blood, Wrist, Left [[id number]] Collected: 07/05/22 2015    Specimen: Blood from Wrist, Left Updated: 07/05/22 2048    CBC & Differential [[id number]]  (Normal) Collected: 07/05/22 2020    Specimen: Blood Updated: 07/05/22 2042    Narrative:      The following orders were created for panel order CBC & Differential.  Procedure                               Abnormality         Status                     ---------                               -----------         ------                     CBC Auto Differential[[id number]]        Normal              Final result                 Please view results for these tests on the individual orders.    CBC Auto Differential [[id number]]  (Normal) Collected: 07/05/22 2020    Specimen: Blood Updated: 07/05/22 2042     WBC 5.54 10*3/mm3      RBC 4.48 10*6/mm3      Hemoglobin 13.3 g/dL      Hematocrit 40.9 %      MCV 91.3 fL      MCH 29.7 pg      MCHC 32.5 g/dL      RDW 13.8 %      RDW-SD 46.5 fl      MPV 9.6 fL      Platelets 314 10*3/mm3      Neutrophil % 54.4 %      Lymphocyte % 30.7 %      Monocyte % 9.7 %      Eosinophil % 4.7 %      Basophil % 0.5 %      Immature Grans % 0.0 %      Neutrophils, Absolute 3.01 10*3/mm3       Lymphocytes, Absolute 1.70 10*3/mm3      Monocytes, Absolute 0.54 10*3/mm3      Eosinophils, Absolute 0.26 10*3/mm3      Basophils, Absolute 0.03 10*3/mm3      Immature Grans, Absolute 0.00 10*3/mm3      nRBC 0.0 /100 WBC           .  Imaging Results (Last 24 Hours)     Procedure Component Value Units Date/Time    US Carotid Bilateral [[id number]] Resulted: 07/06/22 0803     Updated: 07/06/22 0820    MRI Brain Without Contrast [[id number]] Collected: 07/06/22 0806     Updated: 07/06/22 0817    Narrative:      Indication: Altered mental status        Technique: Multisequence, multiplanar MRI of the brain without contrast.     Comparison: CT scan dated 7/5/2022     Findings:      No diffusion signal abnormality. No intra-axial or extra-axial  hemorrhage. 1.6 cm extra-axial dural based, densely calcified mass at  the foramen magnum along its left lateral aspect (series 7-image 4 and  series 8-image 4). There does not appear to be a significant mass effect  on the cervicomedullary junction. Mild to moderate global cortical  atrophy. Slight ventricle prominence as a result of the parenchymal  volume loss. Symmetric volume loss in the hippocampal formations.  Posterior fossa structures are unremarkable. Pituitary gland and sella  are unremarkable. The major intracranial flow-voids are preserved.  Orbital contents are unremarkable. The paranasal sinuses are clear.  Mastoid air cells are clear. Normal bone marrow signal.        Impression:      Impression:     1. 1.6 cm densely calcified meningioma left of midline at the foramen  magnum. There is only a slight mass effect on the cord at the cervical  medullary junction.  2. Global cortical atrophy with slight ventricle prominence. Symmetric  volume loss in the hippocampal formations as well.  This report was finalized on 07/06/2022 08:14 by Dr Elia Underwood, .    XR Chest 1 View [[id number]] Collected: 07/05/22 2133     Updated: 07/05/22 2137    Narrative:      HISTORY:  Weakness     CXR: A frontal view the chest is obtained     COMPARISON: 8/12/2011     FINDINGS: Skinfolds of the right lower hemithorax. Cardiomediastinal  contours remain normal. No acute consolidation or edema. Small calcified  granuloma. No pleural effusion pneumothorax. Thoracic aortic  calcification. Right curvature of the degenerative thoracolumbar spine.  Arthritic changes of the shoulders.       Impression:      1. No convincing acute radiographic cardiopulmonary process.  This report was finalized on 07/05/2022 21:34 by Dr. Veronica Montalvo MD.    CT Head Without Contrast [[id number]] Collected: 07/05/22 2055     Updated: 07/05/22 2102    Narrative:      CT HEAD WO CONTRAST- 7/5/2022 8:41 PM CDT     HISTORY: Mental status change, unknown cause      COMPARISON: None     DOSE LENGTH PRODUCT: 728 mGy cm. Automated exposure control was also  utilized to decrease patient radiation dose.     TECHNIQUE: Axial images of the brain are obtained without IV contrast     FINDINGS:  There is a calcified 2.1 x 1.3 x 1.7 cm extra-axial lesion at  the left cervical cranial junction adjacent the medulla may represent a  calcified meningioma. There is mild to moderate generalized volume loss  with mild chronic small vessel ischemia. No intracranial hemorrhage or  mass effect. No acute signs of ischemia. No extra-axial hematoma or  subarachnoid hemorrhage.     The visible paranasal sinuses and mastoid air cells are well-aerated.  Bony calvarium appears intact.       Impression:      1. 2.1 cm calcified extra-axial lesion along the left cervical cranial  junction suggestive for calcified meningioma. Mild to moderate  generalized volume loss with chronic small vessel ischemic change. No  acute intracranial process identified.  This report was finalized on 07/05/2022 20:59 by Dr. Veronica Montalvo MD.            Assessment/Plan     Impression    • Episodes of confusion and altered mentation  • Significant frontotemporal cortical  atrophy on MRI  • Periventricular white matter disease  • Hypertension, essential  • Polypharmacy with multiple cognitive-appearing medications    Plan    • Will obtain EEG to rule out subclinical seizure, however, this is not strongly suspected.  • Speech therapy consultation for extensive cognitive evaluation.  • Would recommend discontinuation of benzodiazepine (Xanax), Ambien, hydrocodone, hydroxyzine which all have potentially cognitive-impairing effects.  I would eliminate these medications before considering placing her on any other medications for dementia.  However, underlying dementia cannot be excluded.  • If EEG is unremarkable, would see it appropriate to be able to discharge the patient home in stable condition tomorrow morning, however, with outpatient neurology follow-up and ongoing outpatient speech therapy cognitive treatment, if indicated.  She also may benefit from neuropsychology evaluation.  • These medications also place her at increased risk of falls, hip fracture and increased from risk of morbidity/mortality.      Thank you, Dr. Cordova, for the consultation and the opportunity participate in the care of your patient.        Kadeem Perez PA-C  07/06/22  10:08 CDT

## 2022-07-06 NOTE — H&P
[facility]  HISTORY AND PHYSICAL    Date of Admission: 7/5/2022  Primary Care Physician: Jennifer Vidal MD    Subjective     Chief Complaint: Altered mental status, having difficulty finding her words    History of Present Illness  Debra Neri is an 82-year-old female with a past medical history of anxiety-panic attacks, chronic back pain followed by Dr. Reyes, pain management, hypertension, insomnia, below for complete list.  Patient presented to [facility] via EMS for assessment of sudden onset altered mental status.  Daughter at bedside provides history.  Patient had a normal day today however when she got up to go to bed she suddenly started talking out of her head.  Conversation was nonsensical.  Changes in mental status have waxed and waned since arrival.  Currently she follows commands however conversation makes no sense.  Daughter states she had a work-up at [facility] within the last year for similar episode.  She states they were told it was due to low blood pressure.  Medications were adjusted.  Patient did have a fall 8 days ago falling forward hitting her forehead.  CT of the head is negative for acute findings.  Condition is stable.  She is admitted for further evaluation treatment.    Review of Systems   A 10 point review of systems was completed, all negative except for those discussed in HPI    Past Medical History:   Past Medical History:   Diagnosis Date    Anxiety     Chronic back pain     Hypertension     Osteoarthritis     Panic attacks     Rectal carcinoid tumor 12/01/2014    Seasonal allergies     Urinary retention     UTI (urinary tract infection)      Past Surgical History:   Past Surgical History:   Procedure Laterality Date    APPENDECTOMY      BREAST BIOPSY      x 2    CATARACT EXTRACTION Bilateral     CHOLECYSTECTOMY      COLONOSCOPY      2011, 2014    REPLACEMENT TOTAL KNEE       Family History:  family history includes COPD in her mother; Heart disease in her mother; Hypertension in her father.    Social History:  reports that she has quit smoking. She does not have any smokeless tobacco history on file. She reports that she does not drink alcohol.    Code Status: Full, if unable speak for self her children will speak for her      Allergies: Allergen Reactions    Pollen Extract   Nasal congestion       Allergies   Allergen Reactions    Pollen Extract Other (See Comments)     Nasal congestion       Medications:  No current facility-administered medications on file prior to encounter.     Current Outpatient Medications on File Prior to Encounter   Medication Sig Dispense Refill    albuterol sulfate  (90 Base) MCG/ACT inhaler Inhale 2 puffs Every 6 (Six) Hours As Needed for Wheezing.      ALPRAZolam (XANAX) 0.5 MG tablet Take 0.5 mg by mouth At Night As Needed for Sleep.      amLODIPine (NORVASC) 10 MG tablet Take 10 mg by mouth Daily.      atorvastatin (LIPITOR) 40 MG tablet Take 40 mg by mouth Daily.      calcium carbonate (OS-JUAN CARLOS) 600 MG tablet Take 600 mg by mouth Daily.      DULoxetine (CYMBALTA) 60 MG capsule Take 60 mg by mouth Daily.      fluticasone (FLONASE) 50 MCG/ACT nasal spray 2 sprays into the nostril(s) as directed by provider Daily.      gabapentin (NEURONTIN) 100 MG capsule Take 100 mg by mouth 2 (Two) Times a Day As Needed.      hydroCHLOROthiazide (HYDRODIURIL) 25 MG tablet Take 25 mg by mouth Daily.      HYDROcodone-acetaminophen (NORCO) 7.5-325 MG per tablet Take 1 tablet by mouth 3 (Three) Times a Day As Needed for Moderate Pain .      hydrOXYzine (ATARAX) 50 MG tablet Take 50 mg by mouth At Night As Needed (sleep).      losartan (COZAAR) 100 MG tablet Take 100 mg by mouth Daily.      Mirabegron ER (MYRBETRIQ) 50 MG tablet sustained-release 24 hour Take 50 mg by mouth Daily. 30 tablet 3    PEDIATRIC MULTIVITAMINS-FL PO Take 1 tablet by mouth Daily.      verapamil SR (CALAN-SR) 240  "MG CR tablet Take 240 mg by mouth Every Night.      Vitamin D, Cholecalciferol, 50 MCG (2000 UT) capsule Take 2,000 Units by mouth Daily.      zolpidem (Ambien) 10 MG tablet Take 10 mg by mouth Every Night.           I have utilized all available immediate resources to obtain, update, and review the patient's current medications.    Objective     /89   Pulse 78   Temp 98.7 °F (37.1 °C) (Oral)   Resp 20   Ht 165.1 cm (65\")   Wt 80.4 kg (177 lb 4.8 oz)   SpO2 94%   BMI 29.50 kg/m²   Physical Exam  Vitals reviewed.   Constitutional:       Appearance: Normal appearance.   HENT:      Head: Normocephalic and atraumatic.      Mouth/Throat:      Mouth: Mucous membranes are moist.      Pharynx: Oropharynx is clear.   Eyes:      Extraocular Movements: Extraocular movements intact.      Pupils: Pupils are equal, round, and reactive to light.   Cardiovascular:      Rate and Rhythm: Normal rate and regular rhythm.   Pulmonary:      Effort: Pulmonary effort is normal.      Breath sounds: Normal breath sounds.   Abdominal:      General: There is no distension.      Palpations: Abdomen is soft.      Tenderness: There is no abdominal tenderness.   Musculoskeletal:         General: Normal range of motion.      Cervical back: Normal range of motion and neck supple.      Right lower leg: No edema.      Left lower leg: No edema.   Skin:     General: Skin is warm and dry.   Neurological:      Mental Status: She is alert.      Comments: Confused conversation   Psychiatric:         Mood and Affect: Mood normal.         Behavior: Behavior normal.       Pertinent Data:   Lab Results (last 72 hours)       Procedure Component Value Units Date/Time    COVID-19,[facility] [employer] IN-HOUSE,Nasal Swab No Transport Media 3-4 HR TAT - Swab, Nasal Cavity [[id number]]  (Normal) Collected: 07/05/22 2211    Specimen: Swab from Nasal Cavity Updated: 07/05/22 2303     COVID19 Not Detected    Urine Drug Screen - Urine, Clean Catch [[id number]]  (Abnormal) " Collected: 07/05/22 2022    Specimen: Urine, Clean Catch Updated: 07/05/22 2259     THC, Screen, Urine Negative     Phencyclidine (PCP), Urine Negative     Cocaine Screen, Urine Negative     Methamphetamine, Ur Negative     Opiate Screen Positive     Amphetamine Screen, Urine Negative     Benzodiazepine Screen, Urine Positive     Tricyclic Antidepressants Screen Negative     Methadone Screen, Urine Negative     Barbiturates Screen, Urine Negative     Oxycodone Screen, Urine Negative     Propoxyphene Screen Negative     Buprenorphine, Screen, Urine Negative    Urinalysis, Microscopic Only - Urine, Clean Catch [[id number]]  (Abnormal) Collected: 07/05/22 2022    Specimen: Urine, Clean Catch Updated: 07/05/22 2101     RBC, UA 0-2 /HPF      WBC, UA 3-5 /HPF      Comment: Urine culture not indicated.        Bacteria, UA Trace /HPF      Squamous Epithelial Cells, UA 3-6 /HPF      Yeast, UA Small/1+ Budding Yeast /HPF      Hyaline Casts, UA None Seen /LPF      Granular Casts, UA 0-2 /LPF      Mucus, UA Trace /HPF      WBC Clumps, UA Small/1+ /HPF      Methodology Manual Light Microscopy    Urinalysis With Culture If Indicated - Urine, Clean Catch [[id number]]  (Abnormal) Collected: 07/05/22 2022    Specimen: Urine, Clean Catch Updated: 07/05/22 2101     Color, UA Dark Yellow     Appearance, UA Clear     pH, UA 6.0     Specific Gravity, UA 1.027     Glucose, UA Negative     Ketones, UA Trace     Bilirubin, UA Small (1+)     Blood, UA Negative     Protein, UA Negative     Leuk Esterase, UA Trace     Nitrite, UA Negative     Urobilinogen, UA 1.0 E.U./dL    Narrative:      In absence of clinical symptoms, the presence of pyuria, bacteria, and/or nitrites on the urinalysis result does not correlate with infection.    Comprehensive Metabolic Panel [[id number]]  (Abnormal) Collected: 07/05/22 2020    Specimen: Blood Updated: 07/05/22 2059     Glucose 129 mg/dL      BUN 25 mg/dL      Creatinine 0.78 mg/dL      Sodium 138 mmol/L       Potassium 4.1 mmol/L      Comment: Slight hemolysis detected by analyzer. Results may be affected.        Chloride 102 mmol/L      CO2 26.0 mmol/L      Calcium 9.4 mg/dL      Total Protein 6.2 g/dL      Albumin 3.60 g/dL      ALT (SGPT) 10 U/L      AST (SGOT) 14 U/L      Alkaline Phosphatase 85 U/L      Total Bilirubin 0.2 mg/dL      Globulin 2.6 gm/dL      A/G Ratio 1.4 g/dL      BUN/Creatinine Ratio 32.1     Anion Gap 10.0 mmol/L      eGFR 75.9 mL/min/1.73     Lactic Acid, Plasma [[id number]]  (Normal) Collected: 07/05/22 2020    Specimen: Blood Updated: 07/05/22 2057     Lactate 1.6 mmol/L     aPTT [[id number]]  (Normal) Collected: 07/05/22 2020    Specimen: Blood Updated: 07/05/22 2051     PTT 31.6 seconds     Protime-INR [[id number]]  (Abnormal) Collected: 07/05/22 2020    Specimen: Blood Updated: 07/05/22 2051     Protime 14.6 Seconds      INR 1.18    Blood Culture - Blood, Arm, Left [[id number]] Collected: 07/05/22 2020    Specimen: Blood from Arm, Left Updated: 07/05/22 2048    Blood Culture - Blood, Wrist, Left [[id number]] Collected: 07/05/22 2015    Specimen: Blood from Wrist, Left Updated: 07/05/22 2048    CBC Auto Differential [[id number]]  (Normal) Collected: 07/05/22 2020    Specimen: Blood Updated: 07/05/22 2042     WBC 5.54 10*3/mm3      RBC 4.48 10*6/mm3      Hemoglobin 13.3 g/dL      Hematocrit 40.9 %      MCV 91.3 fL      MCH 29.7 pg      MCHC 32.5 g/dL      RDW 13.8 %      RDW-SD 46.5 fl      MPV 9.6 fL      Platelets 314 10*3/mm3      Neutrophil % 54.4 %      Lymphocyte % 30.7 %      Monocyte % 9.7 %      Eosinophil % 4.7 %      Basophil % 0.5 %      Immature Grans % 0.0 %      Neutrophils, Absolute 3.01 10*3/mm3      Lymphocytes, Absolute 1.70 10*3/mm3      Monocytes, Absolute 0.54 10*3/mm3      Eosinophils, Absolute 0.26 10*3/mm3      Basophils, Absolute 0.03 10*3/mm3      Immature Grans, Absolute 0.00 10*3/mm3      nRBC 0.0 /100 WBC           Imaging Results (Last 24 Hours)       Procedure  Component Value Units Date/Time    XR Chest 1 View [[id number]] Collected: 07/05/22 2133     Updated: 07/05/22 2137    Narrative:      HISTORY: Weakness     CXR: A frontal view the chest is obtained     COMPARISON: 8/12/2011     FINDINGS: Skinfolds of the right lower hemithorax. Cardiomediastinal  contours remain normal. No acute consolidation or edema. Small calcified  granuloma. No pleural effusion pneumothorax. Thoracic aortic  calcification. Right curvature of the degenerative thoracolumbar spine.  Arthritic changes of the shoulders.       Impression:      1. No convincing acute radiographic cardiopulmonary process.  This report was finalized on 07/05/2022 21:34 by Dr. Veronica Montalvo MD.    CT Head Without Contrast [[id number]] Collected: 07/05/22 2055     Updated: 07/05/22 2102    Narrative:      CT HEAD WO CONTRAST- 7/5/2022 8:41 PM CDT     HISTORY: Mental status change, unknown cause      COMPARISON: None     DOSE LENGTH PRODUCT: 728 mGy cm. Automated exposure control was also  utilized to decrease patient radiation dose.     TECHNIQUE: Axial images of the brain are obtained without IV contrast     FINDINGS:  There is a calcified 2.1 x 1.3 x 1.7 cm extra-axial lesion at  the left cervical cranial junction adjacent the medulla may represent a  calcified meningioma. There is mild to moderate generalized volume loss  with mild chronic small vessel ischemia. No intracranial hemorrhage or  mass effect. No acute signs of ischemia. No extra-axial hematoma or  subarachnoid hemorrhage.     The visible paranasal sinuses and mastoid air cells are well-aerated.  Bony calvarium appears intact.       Impression:      1. 2.1 cm calcified extra-axial lesion along the left cervical cranial  junction suggestive for calcified meningioma. Mild to moderate  generalized volume loss with chronic small vessel ischemic change. No  acute intracranial process identified.  This report was finalized on 07/05/2022 20:59 by Dr. Martin  MD Selvin.            Assessment / Plan     Assessment:   Active Hospital Problems    Diagnosis     Altered mental status     At risk for polypharmacy     Chronic pain syndrome     Anxiety disorder        Plan:   1.  Admit as inpatient  2.  Follow TIA/stroke protocol  3.  Home medications reviewed and restarted as appropriate  4.  PT prophylaxis with SCDs  5.  MRI of the brain without contrast, bilateral carotid study and echocardiogram in a.m.  6.  Supplemental oxygen as needed, continuous pulse oximetry, remote telemetry  7.  Labs in a.m.  8.  DVT prophylaxis with SCD  9.  Consult neurology in a.m.    I discussed the patient's findings and my recommendations with: Usha Mccoy DO  Time spent: 40 minutes    Case and plan discussed with NP and agree.     Patient seen and examined by me on 7/5/2022 at 9:49 pm.    Electronically signed by TATO Bean, 07/05/22, 23:10 CDT.

## 2022-07-06 NOTE — CASE MANAGEMENT/SOCIAL WORK
Discharge Planning Assessment  [facility]     Patient Name: Debra Neri  MRN: [id number]  Today's Date: 7/6/2022    Admit Date: 7/5/2022     Discharge Needs Assessment     Row Name 07/06/22 1519       Living Environment    People in Home child(elvis), adult    Name(s) of People in Home Maria Elena Mo daughter    Current Living Arrangements home    Primary Care Provided by self    Provides Primary Care For no one, unable/limited ability to care for self    Caregiving Concerns falling, weakness, AMS    Family Caregiver if Needed child(elvis), adult;grandchild(elvis), adult    Family Caregiver Names Maria Elena Mo, daughter; son, Baltazar Neri    Quality of Family Relationships helpful;involved;supportive    Able to Return to Prior Arrangements yes       Resource/Environmental Concerns    Resource/Environmental Concerns none       Transition Planning    Patient/Family Anticipates Transition to home with family    Patient/Family Anticipated Services at Transition none    Transportation Anticipated family or friend will provide       Discharge Needs Assessment    Readmission Within the Last 30 Days no previous admission in last 30 days    Equipment Currently Used at Home cane, quad;walker, rolling;rollator;bath bench    Concerns to be Addressed no discharge needs identified    Anticipated Changes Related to Illness none    Equipment Needed After Discharge none    Discharge Coordination/Progress Plan for dc home with family to assist/ 24/7 care. Patient denies need for Home Health. Patient has DME in home. PCP and rx coverage available. Family at bedside during interview.               Discharge Plan     Row Name 07/06/22 6829       Plan    Plan Comments Attempted to see pt around noon but pt was off the floor for testing. Attempted again but therapy is currently working with pt. Will try again later today or tomorrow.              Continued Care and Services - Admitted Since 7/5/2022    Coordination has not been started for  this encounter.       Expected Discharge Date and Time     Expected Discharge Date Expected Discharge Time    Jul 7, 2022          Demographic Summary    No documentation.                Functional Status    No documentation.                Psychosocial    No documentation.                Abuse/Neglect    No documentation.                Legal    No documentation.                Substance Abuse    No documentation.                Patient Forms    No documentation.                   Macey De La Fuente RN

## 2022-07-07 VITALS
BODY MASS INDEX: 28.84 KG/M2 | OXYGEN SATURATION: 94 % | WEIGHT: 173.1 LBS | SYSTOLIC BLOOD PRESSURE: 129 MMHG | TEMPERATURE: 97.6 F | HEIGHT: 65 IN | RESPIRATION RATE: 20 BRPM | HEART RATE: 85 BPM | DIASTOLIC BLOOD PRESSURE: 88 MMHG

## 2022-07-07 PROCEDURE — G0378 HOSPITAL OBSERVATION PER HR: HCPCS

## 2022-07-07 PROCEDURE — 99214 OFFICE O/P EST MOD 30 MIN: CPT | Performed by: PHYSICIAN ASSISTANT

## 2022-07-07 PROCEDURE — 97535 SELF CARE MNGMENT TRAINING: CPT

## 2022-07-07 RX ORDER — HYDROCODONE BITARTRATE AND ACETAMINOPHEN 7.5; 325 MG/1; MG/1
1 TABLET ORAL 2 TIMES DAILY PRN
Refills: 0
Start: 2022-07-07 | End: 2022-07-07 | Stop reason: SDUPTHER

## 2022-07-07 RX ORDER — GABAPENTIN 100 MG/1
100 CAPSULE ORAL DAILY PRN
Start: 2022-07-07 | End: 2022-07-07 | Stop reason: HOSPADM

## 2022-07-07 RX ORDER — HYDROCODONE BITARTRATE AND ACETAMINOPHEN 7.5; 325 MG/1; MG/1
1 TABLET ORAL DAILY PRN
Refills: 0
Start: 2022-07-07

## 2022-07-07 RX ADMIN — LOSARTAN POTASSIUM 100 MG: 50 TABLET, FILM COATED ORAL at 08:43

## 2022-07-07 RX ADMIN — FLUTICASONE PROPIONATE 2 SPRAY: 50 SPRAY, METERED NASAL at 08:44

## 2022-07-07 RX ADMIN — Medication 10 ML: at 08:44

## 2022-07-07 RX ADMIN — ASPIRIN 81 MG: 81 TABLET, CHEWABLE ORAL at 08:43

## 2022-07-07 RX ADMIN — DULOXETINE HYDROCHLORIDE 60 MG: 30 CAPSULE, DELAYED RELEASE ORAL at 08:43

## 2022-07-07 NOTE — PROGRESS NOTES
Neurology Progress Note      Chief Complaint:    Episode of confusion and impaired speech    Subjective     Subjective:  Patient is improved this morning without any new complaint.  No recurrent confusion or difficulties with speech.  Discussed polypharmacy yesterday.  EEG demonstrates generalized background slowing without any epileptiform activity.      Past Medical History:   Diagnosis Date   • Anxiety    • Chronic back pain    • Hypertension    • Osteoarthritis    • Panic attacks    • Rectal carcinoid tumor 12/01/2014   • Seasonal allergies    • Urinary retention    • UTI (urinary tract infection)      Past Surgical History:   Procedure Laterality Date   • APPENDECTOMY     • BREAST BIOPSY      x 2   • CATARACT EXTRACTION Bilateral    • CHOLECYSTECTOMY     • COLONOSCOPY      2011, 2014   • REPLACEMENT TOTAL KNEE       Family History   Problem Relation Age of Onset   • Heart disease Mother    • COPD Mother    • Hypertension Father      Social History     Tobacco Use   • Smoking status: Former Smoker   Substance Use Topics   • Alcohol use: Never       Medications:  Current Facility-Administered Medications   Medication Dose Route Frequency Provider Last Rate Last Admin   • acetaminophen (TYLENOL) tablet 650 mg  650 mg Oral Q4H PRN Criss Sousa, APRN        Or   • acetaminophen (TYLENOL) 160 MG/5ML solution 650 mg  650 mg Oral Q4H PRN Criss Sousa, APRN        Or   • acetaminophen (TYLENOL) suppository 650 mg  650 mg Rectal Q4H PRN Criss Sousa, APRN       • aspirin chewable tablet 81 mg  81 mg Oral Daily Criss Sousa APRN   81 mg at 07/07/22 0843    Or   • aspirin suppository 300 mg  300 mg Rectal Daily Criss Sousa, APRN       • atorvastatin (LIPITOR) tablet 80 mg  80 mg Oral Nightly Criss Sousa APRN   80 mg at 07/06/22 2013   • DULoxetine (CYMBALTA) DR capsule 60 mg  60 mg Oral Daily Criss Sousa APRN   60 mg at 07/07/22 0843   • fluticasone (FLONASE) 50 MCG/ACT nasal  spray 2 spray  2 spray Nasal Daily Criss Sousa APRN   2 spray at 07/07/22 0844   • losartan (COZAAR) tablet 100 mg  100 mg Oral Daily Criss Sousa APRN   100 mg at 07/07/22 0843   • melatonin tablet 3 mg  3 mg Oral Nightly Noman Sousa MD   3 mg at 07/06/22 2309   • ondansetron (ZOFRAN) tablet 4 mg  4 mg Oral Q6H PRN Criss Sousa APRN        Or   • ondansetron (ZOFRAN) injection 4 mg  4 mg Intravenous Q6H PRN Criss Sousa APRN       • sodium chloride 0.9 % flush 10 mL  10 mL Intravenous PRN Ace Vance Jr., MD       • sodium chloride 0.9 % flush 10 mL  10 mL Intravenous Q12H Criss Sousa APRN   10 mL at 07/07/22 0844   • sodium chloride 0.9 % flush 10 mL  10 mL Intravenous PRN Criss Sousa APRN           Allergies:    Pollen extract    Review of Systems:   -A 14-point review of systems is completed and is negative.      Objective      Vital Signs  Temp:  [97.5 °F (36.4 °C)-98.3 °F (36.8 °C)] 97.6 °F (36.4 °C)  Heart Rate:  [] 85  Resp:  [18-20] 20  BP: (112-155)/(49-88) 129/88    Physical Exam:    General Exam:  Head:  Normal cephalic, atraumatic  HEENT:  Neck supple  Fundoscopic Exam:  No signs of disc edema  CVS:  Regular rate and rhythm.  No murmurs  Carotid Examination:  No bruits  Lungs:  Clear to auscultation  Abdomen:  Non-tender, Non-distended  Extremities:  No signs of peripheral edema  Skin:  No rashes      Neurologic Exam:  Mental Status:    -Awake. Alert. Oriented to person, place & time.  -No word finding difficulties.  -No aphasia.  -No dysarthria.  -Follows simple commands.     CN II:  Full visual fields with confrontation.  Pupils equally reactive to light.  CN III, IV, VI:  Extraocular muscles function intact with no nystagmus.  CN V:  Facial sensory is symmetric.  CN VII:  Facial motor symmetric.  CN VIII:  Gross hearing intact bilaterally.  CN IX/X:  Palate elevates symmetrically.  CN XI:  Shoulder shrug symmetric.  CN XII:  Tongue is  midline on protrusion.     Motor: (strength out of 5:  1= minimal movement, 2 = movement in plane of gravity, 3 = movement against gravity, 4 = movement against some resistance, 5 = full strength)     -Right Upper Ext: Proximal: 5 Distal: 5  -Left Upper Ext: Proximal: 5 Distal: 5    -Right Lower Ext: Proximal: 5 Distal: 5  -Left Lower Ext: Proximal: 5 Distal: 5       Deep Tendon Reflexes:  -Right              Biceps: 2+         Triceps: 2+      Brachioradialis: 2+              Patella: 2+       Ankle: 2+           -Left              Biceps: 2+         Triceps: 2+      Brachioradialis: 2+              Patella: 2+       Ankle: 2+             Tone (Modified Bobby Scale):  No appreciable increase in tone or rigidity noted.     Sensory:  -Intact to light touch, pinprick BUE (C5-T1) and BLE (L2-S1).     Coordination:  -Finger to nose intact BUEs  -Heel to shin intact BLEs  -No ataxia     Gait  -No signs of ataxia  -ambulates unassisted       Results Review:    I reviewed the patient's new clinical results and findings.    Lab Results (last 24 hours)     Procedure Component Value Units Date/Time    Blood Culture - Blood, Arm, Left [[id number]]  (Normal) Collected: 07/05/22 2020    Specimen: Blood from Arm, Left Updated: 07/06/22 2102     Blood Culture No growth at 24 hours    Blood Culture - Blood, Wrist, Left [[id number]]  (Normal) Collected: 07/05/22 2015    Specimen: Blood from Wrist, Left Updated: 07/06/22 2102     Blood Culture No growth at 24 hours    TSH [[id number]]  (Abnormal) Collected: 07/06/22 0448    Specimen: Blood Updated: 07/06/22 1450     TSH 4.240 uIU/mL           Imaging Results (Last 24 Hours)     Procedure Component Value Units Date/Time    US Carotid Bilateral [[id number]] Collected: 07/06/22 1306     Updated: 07/06/22 1310    Narrative:      History: Carotid occlusive disease       Impression:      Impression:  1. There is less than 50% stenosis of the right internal carotid artery.  2. There is less  than 50% stenosis of the left internal carotid artery.  3. Antegrade flow is demonstrated in bilateral vertebral arteries.     Comments: Bilateral carotid vertebral arterial duplex scan was  performed.     Grayscale imaging shows intimal thickening and calcified elements at the  carotid bifurcation. The right internal carotid artery peak systolic  velocity is 89.9 cm/sec. The end-diastolic velocity is 16.8 cm/sec. The  right ICA/CCA ratio is approximately 1.6 . These findings correlate with  less than 50% stenosis of the right internal carotid artery.     Grayscale imaging shows intimal thickening and calcified elements at the  carotid bifurcation. The left internal carotid artery peak systolic  velocity is 63.8 cm/sec. The end-diastolic velocity is 16.8 cm/sec. The  left ICA/CCA ratio is approximately 1.0 . These findings correlate with  less than 50% stenosis of the left internal carotid artery.     Antegrade flow is demonstrated in bilateral vertebral arteries.     This report was finalized on 07/06/2022 13:07 by Dr. Yimi Martínez MD.          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  • Stop all cognitive impairing medications including xanax, Ambien, hydrocodone.    • Can consider trazodone or doxepin as needed for sleep.  • Could consider buspirone as needed for panic attacks if needed.  • Maintain regular sleep and wake cycles.  • Speech therapy evaluation reviewed and very much appreciated.  This demonstrates moderate severity cognitive linguistic scoring.  Recommend outpatient speech therapy ongoing.  • Discussed these findings with patient and will be happy to discuss with daughter when she is  present.  • Recommend outpatient neurology follow-up at which time can discuss medication such as Aricept or Namenda.  However, in the interim, first recommended further weaning and elimination of aforementioned cognitive-impairing medications.        Kadeem Perez PA-C  07/07/22  09:01 CDT

## 2022-07-07 NOTE — PLAN OF CARE
Goal Outcome Evaluation:               Resting well throughout the night.  Remains disoriented to situation, but able to reorient.  Voiding frequently per bathroom with standby assistance and either cane or walker.  NSR per telemetry monitor.  Last NIHSS was 1.  No neurological changes noted throughout the night.

## 2022-07-07 NOTE — DISCHARGE SUMMARY
[facility] Medicine Services  DISCHARGE SUMMARY       Date of Admission: 7/5/2022  Date of Discharge:  7/7/2022  Primary Care Physician: Jennifer Vidal MD    Presenting Problem/Chief Complaint:  Altered mental status    Final Discharge Diagnoses:  Active Hospital Problems    Diagnosis    • Altered mental status, unspecified altered mental status type    • Altered mental status    • At risk for polypharmacy    • Chronic pain syndrome    • Anxiety disorder        Consults: Dr. Ivan ambriz with neurology.    Procedures Performed: None.    Pertinent Test Results:     Imaging Results (All)     Procedure Component Value Units Date/Time    US Carotid Bilateral [[id number]] Collected: 07/06/22 1306     Updated: 07/06/22 1310    Narrative:      History: Carotid occlusive disease       Impression:      Impression:  1. There is less than 50% stenosis of the right internal carotid artery.  2. There is less than 50% stenosis of the left internal carotid artery.  3. Antegrade flow is demonstrated in bilateral vertebral arteries.     Comments: Bilateral carotid vertebral arterial duplex scan was  performed.     Grayscale imaging shows intimal thickening and calcified elements at the  carotid bifurcation. The right internal carotid artery peak systolic  velocity is 89.9 cm/sec. The end-diastolic velocity is 16.8 cm/sec. The  right ICA/CCA ratio is approximately 1.6 . These findings correlate with  less than 50% stenosis of the right internal carotid artery.     Grayscale imaging shows intimal thickening and calcified elements at the  carotid bifurcation. The left internal carotid artery peak systolic  velocity is 63.8 cm/sec. The end-diastolic velocity is 16.8 cm/sec. The  left ICA/CCA ratio is approximately 1.0 . These findings correlate with  less than 50% stenosis of the left internal carotid artery.     Antegrade flow is demonstrated in bilateral vertebral arteries.     This  report was finalized on 07/06/2022 13:07 by Dr. Yimi Martínez MD.    MRI Brain Without Contrast [[id number]] Collected: 07/06/22 0806     Updated: 07/06/22 0817    Narrative:      Indication: Altered mental status        Technique: Multisequence, multiplanar MRI of the brain without contrast.     Comparison: CT scan dated 7/5/2022     Findings:      No diffusion signal abnormality. No intra-axial or extra-axial  hemorrhage. 1.6 cm extra-axial dural based, densely calcified mass at  the foramen magnum along its left lateral aspect (series 7-image 4 and  series 8-image 4). There does not appear to be a significant mass effect  on the cervicomedullary junction. Mild to moderate global cortical  atrophy. Slight ventricle prominence as a result of the parenchymal  volume loss. Symmetric volume loss in the hippocampal formations.  Posterior fossa structures are unremarkable. Pituitary gland and sella  are unremarkable. The major intracranial flow-voids are preserved.  Orbital contents are unremarkable. The paranasal sinuses are clear.  Mastoid air cells are clear. Normal bone marrow signal.        Impression:      Impression:     1. 1.6 cm densely calcified meningioma left of midline at the foramen  magnum. There is only a slight mass effect on the cord at the cervical  medullary junction.  2. Global cortical atrophy with slight ventricle prominence. Symmetric  volume loss in the hippocampal formations as well.  This report was finalized on 07/06/2022 08:14 by Dr Elia Underwood, .    XR Chest 1 View [[id number]] Collected: 07/05/22 2133     Updated: 07/05/22 2137    Narrative:      HISTORY: Weakness     CXR: A frontal view the chest is obtained     COMPARISON: 8/12/2011     FINDINGS: Skinfolds of the right lower hemithorax. Cardiomediastinal  contours remain normal. No acute consolidation or edema. Small calcified  granuloma. No pleural effusion pneumothorax. Thoracic aortic  calcification. Right curvature of the  degenerative thoracolumbar spine.  Arthritic changes of the shoulders.       Impression:      1. No convincing acute radiographic cardiopulmonary process.  This report was finalized on 07/05/2022 21:34 by Dr. Veronica Montalvo MD.    CT Head Without Contrast [[id number]] Collected: 07/05/22 2055     Updated: 07/05/22 2102    Narrative:      CT HEAD WO CONTRAST- 7/5/2022 8:41 PM CDT     HISTORY: Mental status change, unknown cause      COMPARISON: None     DOSE LENGTH PRODUCT: 728 mGy cm. Automated exposure control was also  utilized to decrease patient radiation dose.     TECHNIQUE: Axial images of the brain are obtained without IV contrast     FINDINGS:  There is a calcified 2.1 x 1.3 x 1.7 cm extra-axial lesion at  the left cervical cranial junction adjacent the medulla may represent a  calcified meningioma. There is mild to moderate generalized volume loss  with mild chronic small vessel ischemia. No intracranial hemorrhage or  mass effect. No acute signs of ischemia. No extra-axial hematoma or  subarachnoid hemorrhage.     The visible paranasal sinuses and mastoid air cells are well-aerated.  Bony calvarium appears intact.       Impression:      1. 2.1 cm calcified extra-axial lesion along the left cervical cranial  junction suggestive for calcified meningioma. Mild to moderate  generalized volume loss with chronic small vessel ischemic change. No  acute intracranial process identified.  This report was finalized on 07/05/2022 20:59 by Dr. Veronica Montalvo MD.          LAB RESULTS:      Lab 07/06/22 0448 07/05/22 2020   WBC 5.47 5.54   HEMOGLOBIN 12.8 13.3   HEMATOCRIT 40.2 40.9   PLATELETS 291 314   NEUTROS ABS  --  3.01   IMMATURE GRANS (ABS)  --  0.00   LYMPHS ABS  --  1.70   MONOS ABS  --  0.54   EOS ABS  --  0.26   MCV 91.6 91.3   LACTATE  --  1.6   PROTIME  --  14.6   APTT  --  31.6         Lab 07/06/22 0448 07/05/22 2020   SODIUM 137 138   POTASSIUM 3.7 4.1   CHLORIDE 102 102   CO2 26.0 26.0   ANION GAP  9.0 10.0   BUN 22 25*   CREATININE 0.58 0.78   EGFR 90.5 75.9   GLUCOSE 97 129*   CALCIUM 9.2 9.4   HEMOGLOBIN A1C 5.50  --    TSH 4.240*  --          Lab 07/05/22 2020   TOTAL PROTEIN 6.2   ALBUMIN 3.60   GLOBULIN 2.6   ALT (SGPT) 10   AST (SGOT) 14   BILIRUBIN 0.2   ALK PHOS 85         Lab 07/05/22 2020   PROTIME 14.6   INR 1.18*         Lab 07/06/22  0448   CHOLESTEROL 138   LDL CHOL 69   HDL CHOL 57   TRIGLYCERIDES 57             Brief Urine Lab Results  (Last result in the past 365 days)      Color   Clarity   Blood   Leuk Est   Nitrite   Protein   CREAT   Urine HCG        07/05/22 2022 Dark Yellow   Clear   Negative   Trace   Negative   Negative               Microbiology Results (last 10 days)     Procedure Component Value - Date/Time    COVID PRE-OP / PRE-PROCEDURE SCREENING ORDER (NO ISOLATION) - Swab, Nasal Cavity [[id number]]  (Normal) Collected: 07/05/22 2211    Lab Status: Final result Specimen: Swab from Nasal Cavity Updated: 07/05/22 2303    Narrative:      The following orders were created for panel order COVID PRE-OP / PRE-PROCEDURE SCREENING ORDER (NO ISOLATION) - Swab, Nasal Cavity.  Procedure                               Abnormality         Status                     ---------                               -----------         ------                     COVID-19,[facility] [employer] IN-RIGO...[[id number]]  Normal              Final result                 Please view results for these tests on the individual orders.    COVID-19,[facility] [employer] IN-HOUSE,Nasal Swab No Transport Media 3-4 HR TAT - Swab, Nasal Cavity [[id number]]  (Normal) Collected: 07/05/22 2211    Lab Status: Final result Specimen: Swab from Nasal Cavity Updated: 07/05/22 2303     COVID19 Not Detected    Narrative:      Fact sheet for providers: https://www.fda.gov/media/451365/download     Fact sheet for patients: https://www.fda.gov/media/337572/download    Test performed by PCR.    Consider negative results in combination with clinical observations,  "patient history, and epidemiological information.    Blood Culture - Blood, Arm, Left [[id number]]  (Normal) Collected: 07/05/22 2020    Lab Status: Preliminary result Specimen: Blood from Arm, Left Updated: 07/06/22 2102     Blood Culture No growth at 24 hours    Blood Culture - Blood, Wrist, Left [[id number]]  (Normal) Collected: 07/05/22 2015    Lab Status: Preliminary result Specimen: Blood from Wrist, Left Updated: 07/06/22 2102     Blood Culture No growth at 24 hours          Chief Complaint on Day of Discharge: Patient denies any acute complaints.  Mental status is near baseline.  She feels ready for discharge home.    Hospital Course:  The patient is a 82 y.o. female who presented to [facility] on 7/5 for sudden onset altered mental status.  He has a past medical history significant for anxiety, hypertension, and chronic back pain.  She follows with Dr. Vidal for her primary care and Dr. Negron with pain management.  Dr. Negron manages her Neurontin and hydrocodone.  She lives at home with her daughter.   Patient was in her usual state of health until 7/5 when her daughter demonstrated nonsensical dialogue.  Patient was having difficulty following commands and difficulty finding the \"right word.\"  For example, she stated, \"I do not think my legs are working, so I will borrow this other lady's legs to drive.  Her legs will work better than mine.\"  Reportedly fell about 8 days ago hitting her head.  CT head negative for acute intracranial normality.  Chest x-ray stable.  She was admitted to the hospitalist service for further evaluation and management.    She was seen in consultation by neurology.  MRI of the brain demonstrates significant frontotemporal atrophy with minimal white matter changes in the periventricular space, however, no diffusion weighted restriction abnormalities or acute stroke.  EEG negative for seizure.  Neurology recommends discontinuation of Xanax, Ambien, hydrocodone.  " "Daughter states that she takes Xanax 0.5 mg and Ambien 5 mg at night for sleep.  States that she would like for patient to discontinue use of Xanax and continue Ambien.  She will discuss further with her primary care provider to consider other options such as trazodone or doxepin as needed for sleep.  States that she takes hydrocodone twice daily as needed (it is prescribed as 3 times daily as needed.)  Recommend to decrease to once daily as needed.  She has an upcoming appointment with Dr. Negron on 7/15 to discuss further.  SLP performed cognitive evaluation -demonstrates moderate severity cognitive linguistic score.  Recommend outpatient speech therapy.  Would not exclude underlying dementia.  Outpatient follow-up with neurology at which time can discuss medication such as Aricept or Namenda.  Follow-up with neurology in 1 month.    PT/OT.  Therapy recommends discharge home with 24/7 assist and home health.  Daughter indicates that she does work 24-29 hours weekly.  States that if she needs to she is able to stay home with patient or even able to quit if patient requires more assistance.  She has all needed DME.  We will arrange for home health.    Daughter states that patient's ental status is near baseline today.  Denies any acute complaints.  She feels ready for discharge home.  She is medically stable and appropriate for discharge home today.  She is to follow-up with Dr. Rojelio Lane within 1 week.    Condition on Discharge:  Medically stable.    Physical Exam on Discharge:  /88 (BP Location: Right arm, Patient Position: Sitting)   Pulse 85   Temp 97.6 °F (36.4 °C) (Oral)   Resp 20   Ht 165.1 cm (65\")   Wt 78.5 kg (173 lb 1.6 oz)   SpO2 94%   BMI 28.81 kg/m²   Physical Exam  Vitals reviewed.   Constitutional:       General: She is not in acute distress.     Appearance: She is not toxic-appearing.      Comments: Up in bed.  No acute distress.  Tolerating room air.  No family at bedside.   HENT: "      Head: Normocephalic and atraumatic.      Mouth/Throat:      Mouth: Mucous membranes are moist.      Pharynx: Oropharynx is clear.   Eyes:      Extraocular Movements: Extraocular movements intact.      Conjunctiva/sclera: Conjunctivae normal.      Pupils: Pupils are equal, round, and reactive to light.   Cardiovascular:      Rate and Rhythm: Normal rate and regular rhythm.      Pulses: Normal pulses.   Pulmonary:      Effort: Pulmonary effort is normal. No respiratory distress.      Breath sounds: Normal breath sounds. No wheezing.   Abdominal:      General: Bowel sounds are normal. There is no distension.      Palpations: Abdomen is soft.      Tenderness: There is no abdominal tenderness.   Musculoskeletal:         General: No swelling or tenderness. Normal range of motion.      Cervical back: Normal range of motion and neck supple. No muscular tenderness.   Skin:     General: Skin is warm and dry.      Findings: No erythema or rash.   Neurological:      General: No focal deficit present.      Mental Status: She is alert and oriented to person, place, and time.      Cranial Nerves: No cranial nerve deficit.      Motor: No weakness.   Psychiatric:         Mood and Affect: Mood normal.         Behavior: Behavior normal.     Discharge Disposition:  Home-Health Care [facility]    Discharge Medications:     Discharge Medications      Changes to Medications      Instructions Start Date   HYDROcodone-acetaminophen 7.5-325 MG per tablet  Commonly known as: NORCO  What changed: when to take this   1 tablet, Oral, Daily PRN         Continue These Medications      Instructions Start Date   albuterol sulfate  (90 Base) MCG/ACT inhaler  Commonly known as: PROVENTIL HFA;VENTOLIN HFA;PROAIR HFA   2 puffs, Inhalation, Every 6 Hours PRN      atorvastatin 40 MG tablet  Commonly known as: LIPITOR   40 mg, Oral, Daily      calcium carbonate 600 MG tablet  Commonly known as: OS-JUAN CARLOS   600 mg, Oral, Daily      DULoxetine 60 MG  capsule  Commonly known as: CYMBALTA   60 mg, Oral, Daily      fluticasone 50 MCG/ACT nasal spray  Commonly known as: FLONASE   2 sprays, Nasal, Daily      hydroCHLOROthiazide 25 MG tablet  Commonly known as: HYDRODIURIL   25 mg, Oral, Daily      losartan 100 MG tablet  Commonly known as: COZAAR   100 mg, Oral, Daily      Mirabegron ER 50 MG tablet sustained-release 24 hour 24 hr tablet  Commonly known as: Myrbetriq   50 mg, Oral, Daily      PEDIATRIC MULTIVITAMINS-FL PO   1 tablet, Oral, Daily      Vitamin D (Cholecalciferol) 50 MCG (2000 UT) capsule   2,000 Units, Oral, Daily      Zinc 220 (50 Zn) MG capsule   1 capsule, Oral, Daily      zolpidem 10 MG tablet  Commonly known as: AMBIEN   5 mg, Oral, Nightly, Script written for 1 nightly         Stop These Medications    ALPRAZolam 0.5 MG tablet  Commonly known as: XANAX     gabapentin 100 MG capsule  Commonly known as: NEURONTIN            Discharge Diet:   Diet Instructions     Diet: Regular, Consistent Carbohydrate, Cardiac      Discharge Diet:  Regular  Consistent Carbohydrate  Cardiac             Activity at Discharge:   Activity Instructions     Activity as Tolerated            Discharge Care Plan/Instructions:   1.  Medication changes as above.  2.  Home with home health/SLP/PT/OT.  3.  Seek evaluation for worsening symptoms.    Follow-up Appointments:   1.  Follow-up with Dr. Rojelio Lane within 1 week.  2.  Follow-up with neurology in 1 month.    Test Results Pending at Discharge: None.    Electronically signed by TATO Kidd, 07/07/22, 11:17 CDT.    Time: 35 minutes.

## 2022-07-07 NOTE — THERAPY TREATMENT NOTE
Acute Care - Occupational Therapy Treatment Note  [facility]     Patient Name: Debra Neri  : 1940  MRN: [id number]  Today's Date: 2022             Admit Date: 2022       ICD-10-CM ICD-9-CM   1. Altered mental status, unspecified altered mental status type  R41.82 780.97   2. Cognitive and behavioral changes  R41.89 799.59    R46.89 312.9   3. Decreased activities of daily living (ADL)  Z78.9 V49.89   4. Anxiety disorder, unspecified type  F41.9 300.00   5. Chronic pain syndrome  G89.4 338.4     Patient Active Problem List   Diagnosis   • Altered mental status   • At risk for polypharmacy   • Chronic pain syndrome   • Anxiety disorder   • Altered mental status, unspecified altered mental status type     Past Medical History:   Diagnosis Date   • Anxiety    • Chronic back pain    • Hypertension    • Osteoarthritis    • Panic attacks    • Rectal carcinoid tumor 2014   • Seasonal allergies    • Urinary retention    • UTI (urinary tract infection)      Past Surgical History:   Procedure Laterality Date   • APPENDECTOMY     • BREAST BIOPSY      x 2   • CATARACT EXTRACTION Bilateral    • CHOLECYSTECTOMY     • COLONOSCOPY      2014   • REPLACEMENT TOTAL KNEE           OT ASSESSMENT FLOWSHEET (last 12 hours)     OT Evaluation and Treatment     Row Name 22 0758                   OT Time and Intention    Subjective Information no complaints  -TS        Document Type therapy note (daily note)  -TS        Mode of Treatment occupational therapy  -TS        Patient Effort good  -TS                  General Information    Existing Precautions/Restrictions fall  -TS                  Pain Assessment    Pretreatment Pain Rating 0/10 - no pain  -TS        Posttreatment Pain Rating 0/10 - no pain  -TS                  Cognition    Orientation Status (Cognition) oriented x 4  -TS        Follows Commands (Cognition) WNL  -TS        Personal Safety Interventions fall prevention program  maintained;nonskid shoes/slippers when out of bed  -TS                  Functional Mobility    Functional Mobility- Ind. Level conditional independence  -TS        Functional Mobility- Device cane, quad  -TS                  Transfers    Sit-Stand [place] (Transfers) independent  -TS        Stand-Sit [place] (Transfers) independent  -TS                  Plan of Care Review    Plan of Care Reviewed With patient  -TS        Progress improving  -TS        Outcome Evaluation Pt discharging home this date with daughter. Completed transfers and ambulation with quad cane mod I. Pt reports using cane at home for ambulation along with RW when feeling more fatigued. Pt states she uses tub bench for transfers and remains seated for bathing tasks. Pt daughter lives with pt but works during the day, spoke with pt and daughter on different options for monitoring pt safety while at home alone and encouraged OP PT for core strengthening and balance. Pt is at baseline and has no further OT needs.  -TS                  Positioning and Restraints    Pre-Treatment Position in bed  -TS        Post Treatment Position bed  -TS        In Bed sitting EOB;call light within reach;encouraged to call for assist;side rails up x1;with family/caregiver  -TS              User Key  (r) = Recorded By, (t) = Taken By, (c) = Cosigned By    Initials Name Effective Dates    TS Darrell Rachel N, LOMBARDI 06/16/21 -                  Occupational Therapy Education                 Title: PT OT SLP Therapies (Done)     Topic: Occupational Therapy (Done)     Point: ADL training (Done)     Description:   Instruct learner(s) on proper safety adaptation and remediation techniques during self care or transfers.   Instruct in proper use of assistive devices.              Learning Progress Summary           Patient Acceptance, E, VU by AR at 7/6/2022 1619    Acceptance, E, VU,NR by MM at 7/6/2022 6208    Comment: OT role, benefits, POC, d/c planning, home  and AE/AD safety awareness   Family Acceptance, E, VU,NR by MM at 7/6/2022 1455    Comment: OT role, benefits, POC, d/c planning, home and AE/AD safety awareness                   Point: Home exercise program (Done)     Description:   Instruct learner(s) on appropriate technique for monitoring, assisting and/or progressing therapeutic exercises/activities.              Learning Progress Summary           Patient Acceptance, E, VU by AR at 7/6/2022 1619                   Point: Precautions (Done)     Description:   Instruct learner(s) on prescribed precautions during self-care and functional transfers.              Learning Progress Summary           Patient Acceptance, E, VU by AR at 7/6/2022 1619    Acceptance, E, VU,NR by MM at 7/6/2022 1455    Comment: OT role, benefits, POC, d/c planning, home and AE/AD safety awareness   Family Acceptance, E, VU,NR by MM at 7/6/2022 1455    Comment: OT role, benefits, POC, d/c planning, home and AE/AD safety awareness                   Point: Body mechanics (Done)     Description:   Instruct learner(s) on proper positioning and spine alignment during self-care, functional mobility activities and/or exercises.              Learning Progress Summary           Patient Acceptance, E, VU by AR at 7/6/2022 1619    Acceptance, E, VU,NR by MM at 7/6/2022 1455    Comment: OT role, benefits, POC, d/c planning, home and AE/AD safety awareness   Family Acceptance, E, VU,NR by MM at 7/6/2022 1455    Comment: OT role, benefits, POC, d/c planning, home and AE/AD safety awareness                               User Key     Initials Effective Dates Name Provider Type Discipline     06/16/21 -  Gurdeep Lockwood, OTR/L Occupational Therapist OT    AR 05/24/22 -  Albina Barger, ANDER Registered Nurse Nurse                  OT Recommendation and Plan     Plan of Care Review  Plan of Care Reviewed With: patient  Progress: improving  Outcome Evaluation: Pt discharging home this date with daughter.  Completed transfers and ambulation with quad cane mod I. Pt reports using cane at home for ambulation along with RW when feeling more fatigued. Pt states she uses tub bench for transfers and remains seated for bathing tasks. Pt daughter lives with pt but works during the day, spoke with pt and daughter on different options for monitoring pt safety while at home alone and encouraged OP PT for core strengthening and balance. Pt is at baseline and has no further OT needs.  Plan of Care Reviewed With: patient  Outcome Evaluation: Pt discharging home this date with daughter. Completed transfers and ambulation with quad cane mod I. Pt reports using cane at home for ambulation along with RW when feeling more fatigued. Pt states she uses tub bench for transfers and remains seated for bathing tasks. Pt daughter lives with pt but works during the day, spoke with pt and daughter on different options for monitoring pt safety while at home alone and encouraged OP PT for core strengthening and balance. Pt is at baseline and has no further OT needs.        Time Calculation:    Time Calculation- OT     Row Name 07/07/22 1118             Time Calculation- OT    OT Start Time 0758  -TS      OT Stop Time 0822  -TS      OT Time Calculation (min) 24 min  -TS      Total Timed Code Minutes- OT 24 minute(s)  -TS      OT Received On 07/07/22  -TS              Timed Charges    23785 - OT Self Care/Mgmt Minutes 24  -TS              Total Minutes    Timed Charges Total Minutes 24  -TS       Total Minutes 24  -TS            User Key  (r) = Recorded By, (t) = Taken By, (c) = Cosigned By    Initials Name Provider Type    TS Rachel Ramírez COTA Occupational Therapist Assistant              Therapy Charges for Today     Code Description Service Date Service Provider Modifiers Qty    [id number] HC OT SELF CARE/MGMT/TRAIN EA 15 MIN 7/7/2022 Rachel Ramírez COTA GO 2               Rachel VARGAS. HARJIT Ramírez  7/7/2022

## 2022-07-07 NOTE — PLAN OF CARE
Goal Outcome Evaluation:  Plan of Care Reviewed With: patient        Progress: improving  Outcome Evaluation: Patient is A&Ox4 some forgetfulness noted, but was able to answer all questions appropriately. Patient denies any pain at this time. Patient is on RA with no SOA noted. Patient is sitting up on the side of bed with legs dangling. NIH score 0. Patient denies any numbness or tingling. Patient daughter at bedside. IV LFA D/C. Call light within reach. Bed alarm on. Safety maintained.  Patient is being discharged to go home with daughter.

## 2022-07-07 NOTE — PLAN OF CARE
Goal Outcome Evaluation:  Plan of Care Reviewed With: patient        Progress: improving  Outcome Evaluation: Pt discharging home this date with daughter. Completed transfers and ambulation with quad cane mod I. Pt reports using cane at home for ambulation along with RW when feeling more fatigued. Pt states she uses tub bench for transfers and remains seated for bathing tasks. Pt daughter lives with pt but works during the day, spoke with pt and daughter on different options for monitoring pt safety while at home alone and encouraged OP PT for core strengthening and balance. Pt is at baseline and has no further OT needs.

## 2022-07-07 NOTE — CASE MANAGEMENT/SOCIAL WORK
Continued Stay Note   [place]     Patient Name: Debra Neri  MRN: [id number]  Today's Date: 7/7/2022    Admit Date: 7/5/2022     Discharge Plan     Row Name 07/07/22 1226       Plan    Final Discharge Disposition Code 06 - home with home health care    Final Note Pt is being dcd home today. Order for HH received. Spoke to pt and dtr regarding HH orders and offered choices. [facility]  is preferred. Notified Charity with [facility]  of new orders. Pt states she has dme at home. No other needs identified.               Discharge Codes    No documentation.               Expected Discharge Date and Time     Expected Discharge Date Expected Discharge Time    Jul 7, 2022             MANA Mckinley

## 2022-07-08 NOTE — THERAPY DISCHARGE NOTE
Acute Care - Speech Language Pathology Discharge Summary  [facility]       Patient Name: Debra Neri  : 1940  MRN: [id number]    Today's Date: 2022                   Admit Date: 2022      SLP Recommendation and Plan    Visit Dx:    ICD-10-CM ICD-9-CM   1. Altered mental status, unspecified altered mental status type  R41.82 780.97   2. Cognitive and behavioral changes  R41.89 799.59    R46.89 312.9   3. Decreased activities of daily living (ADL)  Z78.9 V49.89   4. Anxiety disorder, unspecified type  F41.9 300.00   5. Chronic pain syndrome  G89.4 338.4                SLP GOALS     Row Name 22 1500 22 1245          Orientation Goal 1 (SLP)    Improve Orientation Through Goal 1 (SLP) demonstrating orientation to month;demonstrating orientation to year;demonstrating orientation to place;80%  -MB demonstrating orientation to month;demonstrating orientation to year;demonstrating orientation to place;80%  -BN     Time Frame (Orientation Goal 1, SLP) short term goal (STG);by discharge  -MB short term goal (STG);by discharge  -BN     Progress/Outcomes (Orientation Goal 1, SLP) goal not met  -MB new goal;goal ongoing  -BN            Memory Skills Goal 1 (SLP)    Improve Memory Skills Through Goal 1 (SLP) recalling related word lists immediately;recalling related word lists with an imposed delay;recalling unrelated word lists immediately;recalling unrelated word lists with an imposed delay;listen to a paragraph and answer questions;visual memory task;80%  -MB recalling related word lists immediately;recalling related word lists with an imposed delay;recalling unrelated word lists immediately;recalling unrelated word lists with an imposed delay;listen to a paragraph and answer questions;visual memory task;80%  -BN     Time Frame (Memory Skills Goal 1, SLP) short term goal (STG);by discharge  -MB short term goal (STG);by discharge  -BN     Progress/Outcomes (Memory Skills Goal 1, SLP) goal not  met  -MB new goal;goal ongoing  -BN            Organizational Skills Goal 1 (SLP)    Improve Thought Organization Through Goal 1 (SLP) completing a divergent naming task;completing a convergent naming task;naming similarities and differences;80%  -MB completing a divergent naming task;completing a convergent naming task;naming similarities and differences;80%  -BN     Time Frame (Thought Organization Skills Goal 1, SLP) short term goal (STG);by discharge  -MB short term goal (STG);by discharge  -BN     Progress/Outcomes (Thought Organization Skills Goal 1, SLP) goal not met  -MB new goal;goal ongoing  -BN           User Key  (r) = Recorded By, (t) = Taken By, (c) = Cosigned By    Initials Name Provider Type    Анна Shrestha CCC-SLP Speech and Language Pathologist    Mima Nickerson MS-CCC/SLP, HARMAN Speech and Language Pathologist                        SLP Discharge Summary  Anticipated Discharge Disposition (SLP): unknown  Reason for Discharge: discharge from this facility  Progress Toward Achieving Short/long Term Goals: goals not met within established timelines  Discharge Destination: home w/ home health      Анна Rea CCC-SLP  7/8/2022

## 2022-07-08 NOTE — THERAPY DISCHARGE NOTE
Acute Care - Occupational Therapy Discharge Summary  [facility]     Patient Name: Debra Neri  : 1940  MRN: [id number]    Today's Date: 2022                 Admit Date: 2022        OT Recommendation and Plan    Visit Dx:    ICD-10-CM ICD-9-CM   1. Altered mental status, unspecified altered mental status type  R41.82 780.97   2. Cognitive and behavioral changes  R41.89 799.59    R46.89 312.9   3. Decreased activities of daily living (ADL)  Z78.9 V49.89   4. Anxiety disorder, unspecified type  F41.9 300.00   5. Chronic pain syndrome  G89.4 338.4                OT Rehab Goals     Row Name 22 1000             Transfer Goal 1 (OT)    Activity/Assistive Device (Transfer Goal 1, OT) tub;shower chair  -AC      Patrick Level/Cues Needed (Transfer Goal 1, OT) supervision required  -AC      Time Frame (Transfer Goal 1, OT) long term goal (LTG);by discharge  -AC      Progress/Outcome (Transfer Goal 1, OT) goal not met  -AC              Bathing Goal 1 (OT)    Activity/Device (Bathing Goal 1, OT) bathing skills, all;shower chair  -AC      Patrick Level/Cues Needed (Bathing Goal 1, OT) independent  -AC      Time Frame (Bathing Goal 1, OT) long term goal (LTG);by discharge  -AC      Progress/Outcomes (Bathing Goal 1, OT) goal not met  -AC              Dressing Goal 1 (OT)    Activity/Device (Dressing Goal 1, OT) dressing skills, all  -AC      Patrick/Cues Needed (Dressing Goal 1, OT) independent  -AC      Time Frame (Dressing Goal 1, OT) long term goal (LTG);by discharge  -AC      Progress/Outcome (Dressing Goal 1, OT) goal not met  -AC            User Key  (r) = Recorded By, (t) = Taken By, (c) = Cosigned By    Initials Name Provider Type Discipline    AC Herminio Aguirre, OTR/L, CNT Occupational Therapist OT                    Timed Therapy Charges  Total Units: 2    Charges  Total Units: 2    Procedure Name Documented Minutes Units Code    HC OT SELF CARE/MGMT/TRAIN EA 15 MIN 24  2     26121 (CPT®)               Documented Minutes  Total Minutes: 24    Therapy Provided Minutes    04143 - OT Self Care/Mgmt Minutes 24                    OT Discharge Summary  Anticipated Discharge Disposition (OT): home with home health  Reason for Discharge: Discharge from facility  Outcomes Achieved: Refer to plan of care for updates on goals achieved  Discharge Destination: Home with home health      Herminio Aguirre OTR/L, CNT  7/8/2022

## 2022-07-10 LAB
BACTERIA SPEC AEROBE CULT: NORMAL
BACTERIA SPEC AEROBE CULT: NORMAL

## 2022-07-27 DIAGNOSIS — F41.0 PANIC DISORDER (EPISODIC PAROXYSMAL ANXIETY): ICD-10-CM

## 2022-07-27 LAB
BACTERIA: NEGATIVE /HPF
BILIRUBIN URINE: NEGATIVE
BLOOD, URINE: NEGATIVE
CLARITY: CLEAR
COLOR: YELLOW
CRYSTALS, UA: ABNORMAL /HPF
EPITHELIAL CELLS, UA: 1 /HPF (ref 0–5)
GLUCOSE URINE: NEGATIVE MG/DL
HYALINE CASTS: 2 /HPF (ref 0–8)
KETONES, URINE: ABNORMAL MG/DL
LEUKOCYTE ESTERASE, URINE: ABNORMAL
NITRITE, URINE: NEGATIVE
PH UA: 6.5 (ref 5–8)
PROTEIN UA: NEGATIVE MG/DL
RBC UA: 1 /HPF (ref 0–4)
SPECIFIC GRAVITY UA: 1.02 (ref 1–1.03)
UROBILINOGEN, URINE: 1 E.U./DL
WBC UA: 3 /HPF (ref 0–5)

## 2022-07-28 RX ORDER — ALPRAZOLAM 0.5 MG/1
0.5 TABLET ORAL 3 TIMES DAILY PRN
Qty: 90 TABLET | Refills: 0 | Status: SHIPPED | OUTPATIENT
Start: 2022-07-28 | End: 2022-08-27

## 2022-07-28 NOTE — TELEPHONE ENCOUNTER
PDMP Monitoring:    Last PDMP Adore Jones as Reviewed [facility]):  Review User Review Instant Review Result   CHRIS Cedillo 6/1/2022  4:48 PM Reviewed PDMP [1]     Urine Drug Screenings (1 yr)     POCT Rapid Drug Screen  Resulted: 11/13/2015 10:45 AM (Final result)          POCT Rapid Drug Screen  Collected: 8/11/2015 11:24 AM (Final result)              Medication Contract and Consent for Opioid Use Documents Filed     Patient Documents     Type of Document Status Date Received Received By Description    Medication Contract [Status Missing]       Medication Contract Signed 10/4/2017 11:56 AM Elijah Granado

## 2022-07-29 LAB — URINE CULTURE, ROUTINE: NORMAL

## 2022-08-26 DIAGNOSIS — F51.01 PRIMARY INSOMNIA: ICD-10-CM

## 2022-08-26 RX ORDER — ZOLPIDEM TARTRATE 10 MG/1
TABLET ORAL
Qty: 30 TABLET | Refills: 0 | Status: SHIPPED | OUTPATIENT
Start: 2022-08-26 | End: 2022-09-30

## 2022-08-26 RX ORDER — ATORVASTATIN CALCIUM 40 MG/1
TABLET, FILM COATED ORAL
Qty: 90 TABLET | Refills: 3 | Status: SHIPPED | OUTPATIENT
Start: 2022-08-26

## 2022-08-26 NOTE — TELEPHONE ENCOUNTER
Provider needs to review PDMP    PDMP Monitoring:    Last PDMP Maggie Boucher as Reviewed [facility]):  Review User Review Instant Review Result   Cesia Mendes 6/1/2022  4:48 PM Reviewed PDMP [1]     Urine Drug Screenings (1 yr)     POCT Rapid Drug Screen  Resulted: 11/13/2015 10:45 AM (Final result)          POCT Rapid Drug Screen  Collected: 8/11/2015 11:24 AM (Final result)              Medication Contract and Consent for Opioid Use Documents Filed     Patient Documents     Type of Document Status Date Received Received By Description    Medication Contract [Status Missing]       Medication Contract Signed 10/4/2017 11:56 AM Ajit Martinez

## 2022-09-30 DIAGNOSIS — F51.01 PRIMARY INSOMNIA: ICD-10-CM

## 2022-09-30 RX ORDER — ZOLPIDEM TARTRATE 10 MG/1
TABLET ORAL
Qty: 30 TABLET | Refills: 0 | Status: SHIPPED | OUTPATIENT
Start: 2022-09-30 | End: 2022-10-30

## 2022-09-30 NOTE — TELEPHONE ENCOUNTER
Provider needs to review PDMP    PDMP Monitoring:    Last PDMP Andrea Munoz as Reviewed [facility]):  Review User Review Instant Review Result   Mark Castelan 6/1/2022  4:48 PM Reviewed PDMP [1]     Urine Drug Screenings (1 yr)     POCT Rapid Drug Screen  Resulted: 11/13/2015 10:45 AM (Final result)          POCT Rapid Drug Screen  Collected: 8/11/2015 11:24 AM (Final result)              Medication Contract and Consent for Opioid Use Documents Filed     Patient Documents     Type of Document Status Date Received Received By Description    Medication Contract [Status Missing]       Medication Contract Signed 10/4/2017 11:56 AM Alo Mcdonald

## 2022-10-12 RX ORDER — MIRABEGRON 50 MG/1
TABLET, FILM COATED, EXTENDED RELEASE ORAL
Qty: 30 TABLET | Refills: 5 | Status: SHIPPED | OUTPATIENT
Start: 2022-10-12

## 2022-10-30 DIAGNOSIS — F51.01 PRIMARY INSOMNIA: ICD-10-CM

## 2022-10-31 RX ORDER — ZOLPIDEM TARTRATE 10 MG/1
TABLET ORAL
Qty: 30 TABLET | Refills: 0 | OUTPATIENT
Start: 2022-10-31

## 2022-11-02 DIAGNOSIS — F51.01 PRIMARY INSOMNIA: ICD-10-CM

## 2022-11-03 RX ORDER — ZOLPIDEM TARTRATE 10 MG/1
TABLET ORAL
Qty: 30 TABLET | Refills: 0 | OUTPATIENT
Start: 2022-11-03

## 2022-11-03 NOTE — TELEPHONE ENCOUNTER
Provider needs to review PDMP    PDMP Monitoring:    Last PDMP [facility] as Reviewed [facility]):  Review User Review Instant Review Result   Kayleen Favre 6/1/2022  4:48 PM Reviewed PDMP [1]     Urine Drug Screenings (1 yr)       POCT Rapid Drug Screen  Resulted: 11/13/2015 10:45 AM (Final result)              POCT Rapid Drug Screen  Collected: 8/11/2015 11:24 AM (Final result)                  Medication Contract and Consent for Opioid Use Documents Filed       Patient Documents       Type of Document Status Date Received Received By Description    Medication Contract [Status Missing]       Medication Contract Signed 10/4/2017 11:56 AM Daisha Rodney

## 2022-11-15 RX ORDER — POTASSIUM CHLORIDE 1500 MG/1
TABLET, EXTENDED RELEASE ORAL
Qty: 180 TABLET | Refills: 3 | Status: SHIPPED | OUTPATIENT
Start: 2022-11-15

## 2022-11-28 ENCOUNTER — OFFICE VISIT (OUTPATIENT)
Dept: PRIMARY CARE CLINIC | Age: 82
End: 2022-11-28
Payer: MEDICARE

## 2022-11-28 VITALS
RESPIRATION RATE: 16 BRPM | DIASTOLIC BLOOD PRESSURE: 82 MMHG | TEMPERATURE: 96.9 F | SYSTOLIC BLOOD PRESSURE: 122 MMHG | HEART RATE: 86 BPM | OXYGEN SATURATION: 95 % | HEIGHT: 64 IN | BODY MASS INDEX: 27.45 KG/M2 | WEIGHT: 160.8 LBS

## 2022-11-28 DIAGNOSIS — I10 ESSENTIAL HYPERTENSION: ICD-10-CM

## 2022-11-28 DIAGNOSIS — M51.37 DEGENERATION OF LUMBAR OR LUMBOSACRAL INTERVERTEBRAL DISC: ICD-10-CM

## 2022-11-28 DIAGNOSIS — F41.9 ANXIETY: ICD-10-CM

## 2022-11-28 DIAGNOSIS — F51.01 PRIMARY INSOMNIA: Primary | ICD-10-CM

## 2022-11-28 DIAGNOSIS — G89.4 CHRONIC PAIN SYNDROME: ICD-10-CM

## 2022-11-28 DIAGNOSIS — Z79.899 POLYPHARMACY: ICD-10-CM

## 2022-11-28 DIAGNOSIS — Z23 NEED FOR INFLUENZA VACCINATION: ICD-10-CM

## 2022-11-28 PROCEDURE — G8417 CALC BMI ABV UP PARAM F/U: HCPCS | Performed by: FAMILY MEDICINE

## 2022-11-28 PROCEDURE — 1036F TOBACCO NON-USER: CPT | Performed by: FAMILY MEDICINE

## 2022-11-28 PROCEDURE — G8484 FLU IMMUNIZE NO ADMIN: HCPCS | Performed by: FAMILY MEDICINE

## 2022-11-28 PROCEDURE — 1090F PRES/ABSN URINE INCON ASSESS: CPT | Performed by: FAMILY MEDICINE

## 2022-11-28 PROCEDURE — G0008 ADMIN INFLUENZA VIRUS VAC: HCPCS | Performed by: FAMILY MEDICINE

## 2022-11-28 PROCEDURE — 1124F ACP DISCUSS-NO DSCNMKR DOCD: CPT | Performed by: FAMILY MEDICINE

## 2022-11-28 PROCEDURE — 99214 OFFICE O/P EST MOD 30 MIN: CPT | Performed by: FAMILY MEDICINE

## 2022-11-28 PROCEDURE — G8427 DOCREV CUR MEDS BY ELIG CLIN: HCPCS | Performed by: FAMILY MEDICINE

## 2022-11-28 PROCEDURE — 3078F DIAST BP <80 MM HG: CPT | Performed by: FAMILY MEDICINE

## 2022-11-28 PROCEDURE — G8399 PT W/DXA RESULTS DOCUMENT: HCPCS | Performed by: FAMILY MEDICINE

## 2022-11-28 PROCEDURE — 90694 VACC AIIV4 NO PRSRV 0.5ML IM: CPT | Performed by: FAMILY MEDICINE

## 2022-11-28 PROCEDURE — 3074F SYST BP LT 130 MM HG: CPT | Performed by: FAMILY MEDICINE

## 2022-11-28 RX ORDER — ZOLPIDEM TARTRATE 10 MG/1
10 TABLET ORAL NIGHTLY PRN
Qty: 30 TABLET | Refills: 0 | Status: SHIPPED | OUTPATIENT
Start: 2022-11-28 | End: 2022-12-28

## 2022-11-28 ASSESSMENT — PATIENT HEALTH QUESTIONNAIRE - PHQ9
1. LITTLE INTEREST OR PLEASURE IN DOING THINGS: 0
SUM OF ALL RESPONSES TO PHQ QUESTIONS 1-9: 0
2. FEELING DOWN, DEPRESSED OR HOPELESS: 0
SUM OF ALL RESPONSES TO PHQ QUESTIONS 1-9: 0
SUM OF ALL RESPONSES TO PHQ QUESTIONS 1-9: 0
SUM OF ALL RESPONSES TO PHQ9 QUESTIONS 1 & 2: 0
SUM OF ALL RESPONSES TO PHQ QUESTIONS 1-9: 0

## 2022-11-28 NOTE — PROGRESS NOTES
After obtaining consent, and per orders of Dr. eKena Black, injection of Influenza given in Right deltoid by Ade Bonilla LPN. Pt tolerated well.

## 2022-12-02 ASSESSMENT — ENCOUNTER SYMPTOMS
COLOR CHANGE: 0
BACK PAIN: 0
VOMITING: 0
EYE DISCHARGE: 0
WHEEZING: 0
NAUSEA: 0
COUGH: 0
DIARRHEA: 0
ABDOMINAL PAIN: 0

## 2022-12-02 NOTE — PROGRESS NOTES
SUBJECTIVE:    Patient ID: Alpa Rice is a 80 y.o. female. HPI:   Patient is seen today for 3-month follow-up. She states that overall she feels like she is doing okay. She states that she does needRefills on her Ambien although she is trying to come off of it and not use it as often. She states that whenever she was in the hospital back over the summer they told her that a lot of her medications were probably causing some altered mental status and confusion that she was experiencing and she states that she got scared so she is off her gabapentin completely and is trying to work off of the [address] [place] [address]. She states that she is still taking her pain medication because she does have to have this for chronic pain but she states that she also is trying to work on decreasing the dose of this as well. She does have a history of hypertension. Her blood pressure is well controlled in office today. She denies any chest pain or palpitations. She states that overall she is feeling well. She has not had any recent falls. She would like to receive a flu shot while she is here today. Past Medical History:   Diagnosis Date    Arthritis     Chronic back pain     Colon polyps     Constipation     Depression     Diverticulosis     Gall stones     Gallstones     Hayfever     Heart palpitations     Hypertension     Insomnia     Nasal drainage     Osteoarthritis     Osteopenia 01/01/2013    T score -1.7    Panic attacks     Polyp of colon     Rectal carcinoid tumor 12/01/2014    Dr. Malini Baker found on scope/polyps removed.      Seasonal allergies     Shortness of breath on exertion     Sneezing     Urinary frequency     Urinary retention     UTI (lower urinary tract infection)     UTI (lower urinary tract infection)     frequent from atrophic vaginitis    Whooping cough     9 mos old      Current Outpatient Medications on File Prior to Visit   Medication Sig Dispense Refill    KLOR-CON M20 20 MEQ extended release tablet TAKE 1 TABLET BY MOUTH EVERY  tablet 3    MYRBETRIQ 50 MG TB24 TAKE 50 MG BY MOUTH DAILY 30 tablet 5    atorvastatin (LIPITOR) 40 MG tablet TAKE 1 TABLET BY MOUTH EVERY DAY 90 tablet 3    meloxicam (MOBIC) 15 MG tablet TAKE 1 TABLET BY MOUTH EVERY DAY 90 tablet 2    fluticasone (FLONASE) 50 MCG/ACT nasal spray USE 2 SPRAYS IN EACH NOSTRIL EVERY DAY 48 g 3    albuterol sulfate  (90 Base) MCG/ACT inhaler TAKE 2 PUFFS BY MOUTH EVERY 6 HOURS AS NEEDED FOR WHEEZE 6.7 each 2    verapamil (CALAN SR) 240 MG extended release tablet Take 1 tablet by mouth nightly 180 tablet 3    hydroCHLOROthiazide (HYDRODIURIL) 25 MG tablet TAKE 1 TABLET BY MOUTH EVERY DAY IN THE MORNING 90 tablet 1    indapamide (LOZOL) 2.5 MG tablet TAKE 1 TABLET BY MOUTH TWICE A  tablet 3    losartan (COZAAR) 100 MG tablet Take 1 tablet by mouth daily 90 tablet 3    DULoxetine (CYMBALTA) 60 MG extended release capsule Take 1 capsule by mouth daily 90 capsule 3    amLODIPine (NORVASC) 10 MG tablet TAKE 1 TABLET BY MOUTH EVERY DAY 90 tablet 3    citalopram (CELEXA) 40 MG tablet TAKE 1 TABLET BY MOUTH EVERY DAY 90 tablet 3    zinc gluconate 50 MG tablet Take 50 mg by mouth daily      Cholecalciferol (VITAMIN D) 50 MCG (2000 UT) CAPS capsule Take by mouth      HYDROcodone-acetaminophen (NORCO) 7.5-325 MG per tablet Take one up to three times a day as needed for arthritis flares 30 tablet 0    hydrOXYzine (ATARAX) 50 MG tablet TAKE 1 TABLET BY MOUTH NIGHTLY AS NEEDED (SLEEP). 90 tablet 3    Pediatric Multivitamins-Fl (MULT-VITAMIN/FLUORIDE PO) Take  by mouth.        calcium carbonate (OSCAL) 500 MG TABS tablet Take 500 mg by mouth daily. losartan-hydroCHLOROthiazide (HYZAAR) 100-25 MG per tablet TAKE 1 TABLET BY MOUTH EVERY DAY 90 tablet 3     No current facility-administered medications on file prior to visit.      Allergies   Allergen Reactions    Pollen Extract      Nasal congestion       Review of Systems   Constitutional: Negative for activity change, appetite change and fever. HENT:  Negative for congestion and nosebleeds. Eyes:  Negative for discharge. Respiratory:  Negative for cough and wheezing. Cardiovascular:  Negative for chest pain and leg swelling. Gastrointestinal:  Negative for abdominal pain, diarrhea, nausea and vomiting. Genitourinary:  Negative for difficulty urinating, frequency and urgency. Musculoskeletal:  Negative for back pain and gait problem. Skin:  Negative for color change and rash. Neurological:  Negative for dizziness and headaches. Hematological:  Does not bruise/bleed easily. Psychiatric/Behavioral:  Negative for sleep disturbance and suicidal ideas. OBJECTIVE:    Physical Exam  Vitals reviewed. Constitutional:       General: She is not in acute distress. Appearance: Normal appearance. She is well-developed. She is not diaphoretic. HENT:      Head: Normocephalic and atraumatic. Right Ear: External ear normal.      Left Ear: External ear normal.   Cardiovascular:      Rate and Rhythm: Normal rate and regular rhythm. Pulses: Normal pulses. Heart sounds: Normal heart sounds. No murmur heard. Pulmonary:      Effort: Pulmonary effort is normal. No respiratory distress. Breath sounds: Normal breath sounds. Musculoskeletal:      Cervical back: Normal range of motion and neck supple. Skin:     General: Skin is warm and dry. Neurological:      General: No focal deficit present. Mental Status: She is alert and oriented to person, place, and time. Mental status is at baseline. Psychiatric:         Mood and Affect: Mood normal.         Behavior: Behavior normal.         Thought Content: Thought content normal.         Judgment: Judgment normal.      /82   Pulse 86   Temp 96.9 °F (36.1 °C) (Temporal)   Resp 16   Ht 5' 4\" (1.626 m)   Wt 160 lb 12.8 oz (72.9 kg)   SpO2 95%   BMI 27.60 kg/m²      ASSESSMENT/PLAN:    1.  Primary insomnia  -     zolpidem (AMBIEN) 10 MG tablet; Take 1 tablet by mouth nightly as needed for Sleep for up to 30 days. , Disp-30 tablet, R-0Normal  2. Need for influenza vaccination  -     Influenza, FLUAD, (age 72 y+), IM, Preservative Free, 0.5 mL  3. Anxiety  4. Essential hypertension  5. Degeneration of lumbar or lumbosacral intervertebral disc  6. Chronic pain syndrome  7. Polypharmacy       Discussed that I did feel like it was a great idea for her to try to come off of some of the medications that she was taking that could work together against each other. She is already off the gabapentin and Mobic like for her to start weaning down on the Ambien which she is already done. Continue current blood pressure medications. Her blood pressure is well controlled in office today. Follow-up with us if her symptoms or not improving. I would like to see her back in 6 months for a MAW unless needed sooner. PDMP Monitoring:    Last PDMP Elder Holley as Reviewed [facility]):  Review User Review Instant Review Result   CHRIS Martines 6/1/2022  4:48 PM Reviewed PDMP [1]       Urine Drug Screenings (1 yr)       POCT Rapid Drug Screen  Resulted: 11/13/2015 10:45 AM (Final result)              POCT Rapid Drug Screen  Collected: 8/11/2015 11:24 AM (Final result)                  Medication Contract and Consent for Opioid Use Documents Filed       Patient Documents       Type of Document Status Date Received Received By Description    Medication Contract [Status Missing]       Medication Contract Signed 10/4/2017 11:56 AM Michael CARY Dragon/transcription disclaimer:  Much of this encounter note is electronic transcription/translation of spoken language toprinted texts. The electronic translation of spoken language may be erroneous, or at times, nonsensical words or phrases may be inadvertently transcribed.   Although I have reviewed the note for such errors, some may stillexist.

## 2022-12-12 RX ORDER — CETIRIZINE HYDROCHLORIDE 5 MG/1
TABLET ORAL
Qty: 90 TABLET | Refills: 3 | Status: SHIPPED | OUTPATIENT
Start: 2022-12-12

## 2022-12-15 RX ORDER — HYDROCHLOROTHIAZIDE 25 MG/1
TABLET ORAL
Qty: 90 TABLET | Refills: 1 | Status: SHIPPED | OUTPATIENT
Start: 2022-12-15

## 2022-12-15 RX ORDER — LOSARTAN POTASSIUM 100 MG/1
TABLET ORAL
Qty: 90 TABLET | Refills: 3 | Status: SHIPPED | OUTPATIENT
Start: 2022-12-15

## 2023-01-02 DIAGNOSIS — F51.01 PRIMARY INSOMNIA: ICD-10-CM

## 2023-01-03 NOTE — TELEPHONE ENCOUNTER
Provider needs to review PDMP    PDMP Monitoring:    Last PDMP Steven Crews as Reviewed [facility]):  Review User Review Instant Review Result   Liz Ralph 6/1/2022  4:48 PM Reviewed PDMP [1]     Urine Drug Screenings (1 yr)       POCT Rapid Drug Screen  Resulted: 11/13/2015 10:45 AM (Final result)              POCT Rapid Drug Screen  Collected: 8/11/2015 11:24 AM (Final result)                  Medication Contract and Consent for Opioid Use Documents Filed       Patient Documents       Type of Document Status Date Received Received By Description    Medication Contract [Status Missing]       Medication Contract Signed 10/4/2017 11:56 AM Florestine Kussmaul

## 2023-01-04 RX ORDER — ZOLPIDEM TARTRATE 10 MG/1
10 TABLET ORAL NIGHTLY PRN
Qty: 30 TABLET | Refills: 0 | Status: SHIPPED | OUTPATIENT
Start: 2023-01-04 | End: 2023-02-03

## 2023-02-09 DIAGNOSIS — F51.01 PRIMARY INSOMNIA: ICD-10-CM

## 2023-02-09 RX ORDER — ZOLPIDEM TARTRATE 10 MG/1
10 TABLET ORAL NIGHTLY PRN
Qty: 30 TABLET | Refills: 2 | Status: SHIPPED | OUTPATIENT
Start: 2023-02-09 | End: 2023-03-11

## 2023-02-13 RX ORDER — MELOXICAM 15 MG/1
TABLET ORAL
Qty: 90 TABLET | Refills: 2 | Status: SHIPPED | OUTPATIENT
Start: 2023-02-13

## 2023-02-13 RX ORDER — DULOXETIN HYDROCHLORIDE 60 MG/1
CAPSULE, DELAYED RELEASE ORAL
Qty: 90 CAPSULE | Refills: 3 | Status: SHIPPED | OUTPATIENT
Start: 2023-02-13

## 2023-03-21 ENCOUNTER — PATIENT MESSAGE (OUTPATIENT)
Dept: PRIMARY CARE CLINIC | Age: 83
End: 2023-03-21

## 2023-03-21 DIAGNOSIS — F51.01 PRIMARY INSOMNIA: ICD-10-CM

## 2023-03-22 DIAGNOSIS — M19.90 ARTHRITIS: ICD-10-CM

## 2023-03-22 DIAGNOSIS — M25.50 ARTHRALGIA OF MULTIPLE JOINTS: ICD-10-CM

## 2023-03-22 RX ORDER — HYDROCODONE BITARTRATE AND ACETAMINOPHEN 7.5; 325 MG/1; MG/1
1 TABLET ORAL EVERY 8 HOURS PRN
Qty: 90 TABLET | Refills: 0 | Status: SHIPPED | OUTPATIENT
Start: 2023-03-22 | End: 2023-04-21

## 2023-03-22 NOTE — TELEPHONE ENCOUNTER
From: Cheri Schroeder  To: Dr. Tasia Ordaz: 3/21/2023 7:33 PM CDT  Subject: Refills     I need to get refills sent over for my Ambien and alprozalom.  Thank you

## 2023-03-22 NOTE — TELEPHONE ENCOUNTER
Dr Jomar Gonsalves, I can not find in the patient's medication list where we have given her the Ambien.  I did send you a request for her Norco.

## 2023-03-22 NOTE — TELEPHONE ENCOUNTER
PDMP Monitoring:    Last PDMP [employer] as Reviewed [facility]):  Review User Review Instant Review Result   CHRIS GARG 1/4/2023  7:16 AM Reviewed PDMP [1]     Urine Drug Screenings (1 yr)       POCT Rapid Drug Screen  Resulted: 11/13/2015 10:45 AM (Final result)              POCT Rapid Drug Screen  Collected: 8/11/2015 11:24 AM (Final result)                  Medication Contract and Consent for Opioid Use Documents Filed       Patient Documents       Type of Document Status Date Received Received By Description    Medication Contract [Status Missing]       Medication Contract Signed 10/4/2017 11:56 AM Kelsey Hilton

## 2023-03-26 DIAGNOSIS — F41.0 PANIC DISORDER (EPISODIC PAROXYSMAL ANXIETY): ICD-10-CM

## 2023-03-27 NOTE — TELEPHONE ENCOUNTER
Can you call her and find out I thought she had stopped taking the alprazolam.  We have not filled it since July of last year.

## 2023-03-27 NOTE — TELEPHONE ENCOUNTER
PDMP Monitoring:    Last PDMP Hernan Puckett as Reviewed [facility]):  Review User Review Instant Review Result   CHRIS GARG 1/4/2023  7:16 AM Reviewed PDMP [1]     Urine Drug Screenings (1 yr)     POCT Rapid Drug Screen  Resulted: 11/13/2015 10:45 AM (Final result)          POCT Rapid Drug Screen  Collected: 8/11/2015 11:24 AM (Final result)              Medication Contract and Consent for Opioid Use Documents Filed     Patient Documents     Type of Document Status Date Received Received By Description    Medication Contract [Status Missing]       Medication Contract Signed 10/4/2017 11:56 AM Efarin Yu

## 2023-03-28 RX ORDER — ALPRAZOLAM 0.5 MG/1
0.5 TABLET ORAL 3 TIMES DAILY PRN
Qty: 90 TABLET | Refills: 0 | Status: SHIPPED | OUTPATIENT
Start: 2023-03-28 | End: 2023-04-27

## 2023-04-17 ENCOUNTER — OFFICE VISIT (OUTPATIENT)
Dept: PRIMARY CARE CLINIC | Age: 83
End: 2023-04-17
Payer: MEDICARE

## 2023-04-17 VITALS
BODY MASS INDEX: 28.34 KG/M2 | HEART RATE: 84 BPM | WEIGHT: 166 LBS | SYSTOLIC BLOOD PRESSURE: 124 MMHG | HEIGHT: 64 IN | RESPIRATION RATE: 18 BRPM | TEMPERATURE: 97.1 F | OXYGEN SATURATION: 97 % | DIASTOLIC BLOOD PRESSURE: 80 MMHG

## 2023-04-17 DIAGNOSIS — E78.5 HYPERLIPIDEMIA, UNSPECIFIED HYPERLIPIDEMIA TYPE: ICD-10-CM

## 2023-04-17 DIAGNOSIS — I10 ESSENTIAL HYPERTENSION: Primary | ICD-10-CM

## 2023-04-17 DIAGNOSIS — Z79.899 MEDICATION MANAGEMENT: ICD-10-CM

## 2023-04-17 LAB
ALBUMIN SERPL-MCNC: 4 G/DL (ref 3.5–5.2)
ALP SERPL-CCNC: 82 U/L (ref 35–104)
ALT SERPL-CCNC: 13 U/L (ref 5–33)
ANION GAP SERPL CALCULATED.3IONS-SCNC: 10 MMOL/L (ref 7–19)
AST SERPL-CCNC: 17 U/L (ref 5–32)
BASOPHILS # BLD: 0.1 K/UL (ref 0–0.2)
BASOPHILS NFR BLD: 1 % (ref 0–1)
BILIRUB SERPL-MCNC: 0.4 MG/DL (ref 0.2–1.2)
BUN SERPL-MCNC: 19 MG/DL (ref 8–23)
CALCIUM SERPL-MCNC: 10 MG/DL (ref 8.8–10.2)
CHLORIDE SERPL-SCNC: 105 MMOL/L (ref 98–111)
CHOLEST SERPL-MCNC: 168 MG/DL (ref 160–199)
CO2 SERPL-SCNC: 26 MMOL/L (ref 22–29)
CREAT SERPL-MCNC: 0.7 MG/DL (ref 0.5–0.9)
EOSINOPHIL # BLD: 0.3 K/UL (ref 0–0.6)
EOSINOPHIL NFR BLD: 5 % (ref 0–5)
ERYTHROCYTE [DISTWIDTH] IN BLOOD BY AUTOMATED COUNT: 14.1 % (ref 11.5–14.5)
GLUCOSE SERPL-MCNC: 106 MG/DL (ref 74–109)
HCT VFR BLD AUTO: 44 % (ref 37–47)
HDLC SERPL-MCNC: 65 MG/DL (ref 65–121)
HGB BLD-MCNC: 14.3 G/DL (ref 12–16)
IMM GRANULOCYTES # BLD: 0 K/UL
LDLC SERPL CALC-MCNC: 81 MG/DL
LYMPHOCYTES # BLD: 1.6 K/UL (ref 1.1–4.5)
LYMPHOCYTES NFR BLD: 26.2 % (ref 20–40)
MCH RBC QN AUTO: 29.5 PG (ref 27–31)
MCHC RBC AUTO-ENTMCNC: 32.5 G/DL (ref 33–37)
MCV RBC AUTO: 90.9 FL (ref 81–99)
MONOCYTES # BLD: 0.5 K/UL (ref 0–0.9)
MONOCYTES NFR BLD: 7.5 % (ref 0–10)
NEUTROPHILS # BLD: 3.8 K/UL (ref 1.5–7.5)
NEUTS SEG NFR BLD: 60 % (ref 50–65)
PLATELET # BLD AUTO: 339 K/UL (ref 130–400)
PMV BLD AUTO: 10.5 FL (ref 9.4–12.3)
POTASSIUM SERPL-SCNC: 4.2 MMOL/L (ref 3.5–5)
PROT SERPL-MCNC: 6.9 G/DL (ref 6.6–8.7)
RBC # BLD AUTO: 4.84 M/UL (ref 4.2–5.4)
SODIUM SERPL-SCNC: 141 MMOL/L (ref 136–145)
TRIGL SERPL-MCNC: 108 MG/DL (ref 0–149)
WBC # BLD AUTO: 6.3 K/UL (ref 4.8–10.8)

## 2023-04-17 PROCEDURE — 99999 PR OFFICE/OUTPT VISIT,PROCEDURE ONLY: CPT | Performed by: FAMILY MEDICINE

## 2023-04-17 PROCEDURE — 80305 DRUG TEST PRSMV DIR OPT OBS: CPT | Performed by: FAMILY MEDICINE

## 2023-04-17 RX ORDER — ZOLPIDEM TARTRATE 10 MG/1
10 TABLET ORAL NIGHTLY PRN
COMMUNITY
Start: 2023-03-26 | End: 2023-04-17

## 2023-04-17 SDOH — ECONOMIC STABILITY: FOOD INSECURITY: WITHIN THE PAST 12 MONTHS, THE FOOD YOU BOUGHT JUST DIDN'T LAST AND YOU DIDN'T HAVE MONEY TO GET MORE.: NEVER TRUE

## 2023-04-17 SDOH — ECONOMIC STABILITY: INCOME INSECURITY: HOW HARD IS IT FOR YOU TO PAY FOR THE VERY BASICS LIKE FOOD, HOUSING, MEDICAL CARE, AND HEATING?: NOT HARD AT ALL

## 2023-04-17 SDOH — ECONOMIC STABILITY: FOOD INSECURITY: WITHIN THE PAST 12 MONTHS, YOU WORRIED THAT YOUR FOOD WOULD RUN OUT BEFORE YOU GOT MONEY TO BUY MORE.: NEVER TRUE

## 2023-04-17 SDOH — ECONOMIC STABILITY: HOUSING INSECURITY
IN THE LAST 12 MONTHS, WAS THERE A TIME WHEN YOU DID NOT HAVE A STEADY PLACE TO SLEEP OR SLEPT IN A SHELTER (INCLUDING NOW)?: NO

## 2023-04-17 ASSESSMENT — PATIENT HEALTH QUESTIONNAIRE - PHQ9
SUM OF ALL RESPONSES TO PHQ9 QUESTIONS 1 & 2: 0
1. LITTLE INTEREST OR PLEASURE IN DOING THINGS: 0
SUM OF ALL RESPONSES TO PHQ QUESTIONS 1-9: 0
2. FEELING DOWN, DEPRESSED OR HOPELESS: 0

## 2023-04-17 NOTE — PROGRESS NOTES
Patient was not seen in office today because she was just due for a urine drug screen and updating her medication agreement. .  She did not need an office visit because we have just seen her recently and she is scheduled for Medicare annual wellness in a couple of weeks. We are going to cancel today's visit out and no charge it.

## 2023-05-01 ENCOUNTER — OFFICE VISIT (OUTPATIENT)
Dept: PRIMARY CARE CLINIC | Age: 83
End: 2023-05-01
Payer: MEDICARE

## 2023-05-01 VITALS
DIASTOLIC BLOOD PRESSURE: 78 MMHG | TEMPERATURE: 98.2 F | HEART RATE: 102 BPM | BODY MASS INDEX: 28.2 KG/M2 | SYSTOLIC BLOOD PRESSURE: 118 MMHG | OXYGEN SATURATION: 97 % | RESPIRATION RATE: 16 BRPM | WEIGHT: 165.2 LBS | HEIGHT: 64 IN

## 2023-05-01 DIAGNOSIS — Z00.00 MEDICARE ANNUAL WELLNESS VISIT, SUBSEQUENT: Primary | ICD-10-CM

## 2023-05-01 PROCEDURE — G0439 PPPS, SUBSEQ VISIT: HCPCS | Performed by: FAMILY MEDICINE

## 2023-05-01 PROCEDURE — 3078F DIAST BP <80 MM HG: CPT | Performed by: FAMILY MEDICINE

## 2023-05-01 PROCEDURE — 3074F SYST BP LT 130 MM HG: CPT | Performed by: FAMILY MEDICINE

## 2023-05-01 PROCEDURE — 1124F ACP DISCUSS-NO DSCNMKR DOCD: CPT | Performed by: FAMILY MEDICINE

## 2023-05-01 ASSESSMENT — PATIENT HEALTH QUESTIONNAIRE - PHQ9
SUM OF ALL RESPONSES TO PHQ QUESTIONS 1-9: 0
2. FEELING DOWN, DEPRESSED OR HOPELESS: 0
SUM OF ALL RESPONSES TO PHQ QUESTIONS 1-9: 0
SUM OF ALL RESPONSES TO PHQ9 QUESTIONS 1 & 2: 0
1. LITTLE INTEREST OR PLEASURE IN DOING THINGS: 0
SUM OF ALL RESPONSES TO PHQ QUESTIONS 1-9: 0
SUM OF ALL RESPONSES TO PHQ QUESTIONS 1-9: 0

## 2023-05-01 ASSESSMENT — LIFESTYLE VARIABLES
HOW MANY STANDARD DRINKS CONTAINING ALCOHOL DO YOU HAVE ON A TYPICAL DAY: PATIENT DOES NOT DRINK
HOW OFTEN DO YOU HAVE A DRINK CONTAINING ALCOHOL: NEVER

## 2023-05-01 NOTE — PROGRESS NOTES
Medicare Annual Wellness Visit    Trevon Burks is here for Medicare AWV (Pt got her lab work last week per pt)    Assessment & Plan   Medicare annual wellness visit, subsequent    Recommendations for Preventive Services Due: see orders and patient instructions/AVS.  Recommended screening schedule for the next 5-10 years is provided to the patient in written form: see Patient Instructions/AVS.     Return in about 6 months (around 11/1/2023) for 6 month follow up. Subjective   The following acute and/or chronic problems were also addressed today:  No issues today. Patient's complete Health Risk Assessment and screening values have been reviewed and are found in Flowsheets. The following problems were reviewed today and where indicated follow up appointments were made and/or referrals ordered. Positive Risk Factor Screenings with Interventions:    Fall Risk:  Do you feel unsteady or are you worried about falling? : (!) yes  2 or more falls in past year?: (!) yes  Fall with injury in past year?: no     Interventions:    Patient declines any further evaluation or treatment  Uses a walker and a cane at home             Opioid Risk: (Low risk score <55) Opioid risk score: 23    Patient is low risk for opioid use disorder or overdose. Last PDMP Theresa Moran as Reviewed:  Review User Review Instant Review Result   ALEJANDROConyCHRIS STEPHENS 4/17/2023 12:10 PM     Reviewed PDMP [1]         Weight and Activity:  Physical Activity: Inactive    Days of Exercise per Week: 0 days    Minutes of Exercise per Session: 0 min     On average, how many days per week do you engage in moderate to strenuous exercise (like a brisk walk)?: 0 days  Have you lost any weight without trying in the past 3 months?: No  Body mass index is 28.36 kg/m².     Inactivity Interventions:  See AVS for additional education material        Vision Screen:  Do you have difficulty driving, watching TV, or doing any of your daily activities because of your

## 2023-08-09 ENCOUNTER — PATIENT MESSAGE (OUTPATIENT)
Dept: PRIMARY CARE CLINIC | Age: 83
End: 2023-08-09

## 2023-08-09 RX ORDER — ALPRAZOLAM 0.5 MG/1
0.5 TABLET ORAL 3 TIMES DAILY PRN
COMMUNITY
End: 2023-08-09 | Stop reason: SDUPTHER

## 2023-08-09 RX ORDER — ALPRAZOLAM 0.5 MG/1
0.5 TABLET ORAL 3 TIMES DAILY PRN
Qty: 30 TABLET | Refills: 0 | Status: SHIPPED | OUTPATIENT
Start: 2023-08-09 | End: 2023-09-09

## 2023-08-09 NOTE — TELEPHONE ENCOUNTER
From: Yuni Medlye  To: Dr. Fayette Oppenheim: 8/9/2023 3:57 PM CDT  Subject: Refill    I am out of my alprozalam . It's been a few months so I am taking less nut want to have a bottle on standby in case I need it.  Thanks call me if any questions

## 2023-08-09 NOTE — TELEPHONE ENCOUNTER
Provider needs to review PDMP    PDMP Monitoring:    Last PDMP Libia Watson as Reviewed [facility]):  Review User Review Instant Review Result   CHRIS Bullard 4/17/2023 12:10 PM Reviewed PDMP [1]     Urine Drug Screenings (1 yr)       POCT Rapid Drug Screen  Resulted: 11/13/2015 10:45 AM (Final result)              POCT Rapid Drug Screen  Collected: 8/11/2015 11:24 AM (Final result)                  Medication Contract and Consent for Opioid Use Documents Filed       Patient Documents       Type of Document Status Date Received Received By Description    Medication Contract [Status Missing]       Medication Contract Signed 10/4/2017 11:56 AM Chad Jama

## 2023-08-14 RX ORDER — DULOXETIN HYDROCHLORIDE 60 MG/1
CAPSULE, DELAYED RELEASE ORAL
Qty: 90 CAPSULE | Refills: 3 | Status: SHIPPED | OUTPATIENT
Start: 2023-08-14

## 2023-09-05 RX ORDER — ATORVASTATIN CALCIUM 40 MG/1
40 TABLET, FILM COATED ORAL DAILY
Qty: 90 TABLET | Refills: 3 | Status: SHIPPED | OUTPATIENT
Start: 2023-09-05

## 2023-10-09 DIAGNOSIS — F41.9 ANXIETY: Primary | ICD-10-CM

## 2023-10-09 RX ORDER — ALPRAZOLAM 0.5 MG/1
0.5 TABLET ORAL 3 TIMES DAILY PRN
Qty: 30 TABLET | Refills: 0 | Status: SHIPPED | OUTPATIENT
Start: 2023-10-09 | End: 2023-11-09

## 2023-10-09 NOTE — TELEPHONE ENCOUNTER
Provider needs to review PDMP    PDMP Monitoring:    Last PDMP Jasbir Sees as Reviewed [facility]):  Review User Review Instant Review Result   CHRIS Robertson 4/17/2023 12:10 PM Reviewed PDMP [1]     Urine Drug Screenings (1 yr)       POCT Rapid Drug Screen  Resulted: 11/13/2015 10:45 AM (Final result)              POCT Rapid Drug Screen  Collected: 8/11/2015 11:24 AM (Final result)                  Medication Contract and Consent for Opioid Use Documents Filed       Patient Documents       Type of Document Status Date Received Received By Description    Medication Contract [Status Missing]       Medication Contract Signed 10/4/2017 11:56 AM Jose Draper

## 2023-11-01 ENCOUNTER — OFFICE VISIT (OUTPATIENT)
Dept: PRIMARY CARE CLINIC | Age: 83
End: 2023-11-01
Payer: MEDICARE

## 2023-11-01 VITALS
DIASTOLIC BLOOD PRESSURE: 84 MMHG | SYSTOLIC BLOOD PRESSURE: 130 MMHG | HEIGHT: 63 IN | HEART RATE: 81 BPM | TEMPERATURE: 97.5 F | OXYGEN SATURATION: 97 % | BODY MASS INDEX: 30.19 KG/M2 | WEIGHT: 170.4 LBS | RESPIRATION RATE: 16 BRPM

## 2023-11-01 DIAGNOSIS — E78.5 HYPERLIPIDEMIA, UNSPECIFIED HYPERLIPIDEMIA TYPE: ICD-10-CM

## 2023-11-01 DIAGNOSIS — F41.9 ANXIETY: Primary | ICD-10-CM

## 2023-11-01 DIAGNOSIS — I10 ESSENTIAL HYPERTENSION: ICD-10-CM

## 2023-11-01 PROCEDURE — 99212 OFFICE O/P EST SF 10 MIN: CPT | Performed by: FAMILY MEDICINE

## 2023-11-01 PROCEDURE — G8427 DOCREV CUR MEDS BY ELIG CLIN: HCPCS | Performed by: FAMILY MEDICINE

## 2023-11-01 PROCEDURE — 3078F DIAST BP <80 MM HG: CPT | Performed by: FAMILY MEDICINE

## 2023-11-01 PROCEDURE — G8484 FLU IMMUNIZE NO ADMIN: HCPCS | Performed by: FAMILY MEDICINE

## 2023-11-01 PROCEDURE — G8417 CALC BMI ABV UP PARAM F/U: HCPCS | Performed by: FAMILY MEDICINE

## 2023-11-01 PROCEDURE — 1124F ACP DISCUSS-NO DSCNMKR DOCD: CPT | Performed by: FAMILY MEDICINE

## 2023-11-01 PROCEDURE — 1036F TOBACCO NON-USER: CPT | Performed by: FAMILY MEDICINE

## 2023-11-01 PROCEDURE — 3074F SYST BP LT 130 MM HG: CPT | Performed by: FAMILY MEDICINE

## 2023-11-01 PROCEDURE — G8399 PT W/DXA RESULTS DOCUMENT: HCPCS | Performed by: FAMILY MEDICINE

## 2023-11-01 PROCEDURE — 1090F PRES/ABSN URINE INCON ASSESS: CPT | Performed by: FAMILY MEDICINE

## 2023-11-01 RX ORDER — HYDROCODONE BITARTRATE AND ACETAMINOPHEN 7.5; 325 MG/1; MG/1
TABLET ORAL
COMMUNITY
Start: 2023-10-11

## 2023-11-02 ASSESSMENT — ENCOUNTER SYMPTOMS
ABDOMINAL PAIN: 0
DIARRHEA: 0
VOMITING: 0
COUGH: 0
BACK PAIN: 0
COLOR CHANGE: 0
WHEEZING: 0
EYE DISCHARGE: 0
NAUSEA: 0

## 2023-11-02 NOTE — PROGRESS NOTES
SUBJECTIVE:    Patient ID: Elzbieta Mina is a 80 y.o. female. HPI:   Patient is seen today for 6-month follow-up. She has a history of anxiety. She is currently on Xanax only takes it sparingly. She is also on Celexa. She states that she has not had any recent change in these and is taking the Xanax very sparingly and not taking it very often at all. She does have a history of hypertension. Blood pressures well controlled in office today. She denies any chest pain or palpitations. She is taking her blood pressure medication regularly. She is no longer taking her sleeping medication and overall states that she feels like she is doing well. She has interested in getting the RSV vaccine at her pharmacy. Past Medical History:   Diagnosis Date    Arthritis     Chronic back pain     Colon polyps     Constipation     Depression     Diverticulosis     Gall stones     Gallstones     Hayfever     Heart palpitations     Hypertension     Insomnia     Nasal drainage     Osteoarthritis     Osteopenia 01/01/2013    T score -1.7    Panic attacks     Polyp of colon     Rectal carcinoid tumor 12/01/2014    Dr. Nelly Hernandez found on scope/polyps removed. Seasonal allergies     Shortness of breath on exertion     Sneezing     Urinary frequency     Urinary retention     UTI (lower urinary tract infection)     UTI (lower urinary tract infection)     frequent from atrophic vaginitis    Whooping cough     9 mos old      Current Outpatient Medications on File Prior to Visit   Medication Sig Dispense Refill    HYDROcodone-acetaminophen (NORCO) 7.5-325 MG per tablet TAKE 1 TABLET EVERY 12 HOURS AS NEEDED. ALPRAZolam (XANAX) 0.5 MG tablet Take 1 tablet by mouth 3 times daily as needed for Anxiety for up to 30 doses.  Max Daily Amount: 1.5 mg 30 tablet 0    atorvastatin (LIPITOR) 40 MG tablet Take 1 tablet by mouth daily 90 tablet 3    DULoxetine (CYMBALTA) 60 MG extended release capsule TAKE 1 CAPSULE BY MOUTH EVERY

## 2023-12-01 DIAGNOSIS — F41.9 ANXIETY: ICD-10-CM

## 2023-12-01 NOTE — TELEPHONE ENCOUNTER
Provider needs to review PDMP    PDMP Monitoring:    Last PDMP Leonard Meals as Reviewed [facility]):  Review User Review Instant Review Result   CHRIS Riley 4/17/2023 12:10 PM Reviewed PDMP [1]     Urine Drug Screenings (1 yr)       POCT Rapid Drug Screen  Resulted: 11/13/2015 10:45 AM (Final result)              POCT Rapid Drug Screen  Collected: 8/11/2015 11:24 AM (Final result)                  Medication Contract and Consent for Opioid Use Documents Filed       Patient Documents       Type of Document Status Date Received Received By Description    Medication Contract [Status Missing]       Medication Contract Signed 10/4/2017 11:56 AM Fermin Castillo

## 2023-12-04 RX ORDER — ALPRAZOLAM 0.5 MG/1
TABLET ORAL
Qty: 30 TABLET | Refills: 0 | Status: SHIPPED | OUTPATIENT
Start: 2023-12-04 | End: 2024-01-03

## 2023-12-08 ENCOUNTER — TELEPHONE (OUTPATIENT)
Dept: PRIMARY CARE CLINIC | Age: 83
End: 2023-12-08

## 2023-12-08 NOTE — TELEPHONE ENCOUNTER
Pt calls complaining of UTI symptoms and asks that we call in abx. I called her to see if she can come in to leave a urine sample and was unable to reach her by phone at the number she left on the voicemail.  Will try to call again later

## 2023-12-08 NOTE — TELEPHONE ENCOUNTER
Roly Vasquez spoke with pt.  She refused to bring in a urine and says she would call if symptoms get worse

## 2023-12-26 RX ORDER — HYDROCHLOROTHIAZIDE 25 MG/1
TABLET ORAL
Qty: 30 TABLET | Refills: 0 | Status: SHIPPED | OUTPATIENT
Start: 2023-12-26

## 2024-01-08 RX ORDER — MELOXICAM 15 MG/1
15 TABLET ORAL DAILY
Qty: 90 TABLET | Refills: 2 | Status: SHIPPED | OUTPATIENT
Start: 2024-01-08

## 2024-01-08 RX ORDER — HYDROCHLOROTHIAZIDE 25 MG/1
25 TABLET ORAL EVERY MORNING
Qty: 30 TABLET | Refills: 0 | Status: SHIPPED | OUTPATIENT
Start: 2024-01-08 | End: 2024-01-09 | Stop reason: SDUPTHER

## 2024-01-08 RX ORDER — LOSARTAN POTASSIUM 100 MG/1
TABLET ORAL
Qty: 90 TABLET | Refills: 3 | Status: SHIPPED | OUTPATIENT
Start: 2024-01-08 | End: 2024-01-08 | Stop reason: SDUPTHER

## 2024-01-08 RX ORDER — LOSARTAN POTASSIUM 100 MG/1
100 TABLET ORAL DAILY
Qty: 90 TABLET | Refills: 3 | Status: SHIPPED | OUTPATIENT
Start: 2024-01-08

## 2024-01-09 DIAGNOSIS — F41.9 ANXIETY: ICD-10-CM

## 2024-01-09 RX ORDER — ALPRAZOLAM 0.5 MG/1
TABLET ORAL
Qty: 30 TABLET | Refills: 0 | Status: SHIPPED | OUTPATIENT
Start: 2024-01-09 | End: 2024-02-09

## 2024-01-09 RX ORDER — HYDROCHLOROTHIAZIDE 25 MG/1
25 TABLET ORAL EVERY MORNING
Qty: 90 TABLET | Refills: 1 | Status: SHIPPED | OUTPATIENT
Start: 2024-01-09

## 2024-01-09 NOTE — TELEPHONE ENCOUNTER
Provider needs to review PDMP    PDMP Monitoring:    Last PDMP [place] as Reviewed ([place]):  Review User Review Instant Review Result   CHRIS GARG 4/17/2023 12:10 PM Reviewed PDMP [1]     Urine Drug Screenings (1 yr)       POCT Rapid Drug Screen  Resulted: 11/13/2015 10:45 AM (Final result)              POCT Rapid Drug Screen  Collected: 8/11/2015 11:24 AM (Final result)                  Medication Contract and Consent for Opioid Use Documents Filed       Patient Documents       Type of Document Status Date Received Received By Description    Medication Contract [Status Missing]       Medication Contract Signed 10/4/2017 11:56 AM VINICIUS GOULD

## 2024-02-06 DIAGNOSIS — F41.9 ANXIETY: ICD-10-CM

## 2024-02-07 RX ORDER — ALPRAZOLAM 0.5 MG/1
TABLET ORAL
Qty: 30 TABLET | Refills: 0 | Status: SHIPPED | OUTPATIENT
Start: 2024-02-07 | End: 2024-03-08

## 2024-02-15 RX ORDER — ALBUTEROL SULFATE 90 UG/1
AEROSOL, METERED RESPIRATORY (INHALATION)
Qty: 6.7 EACH | Refills: 2 | Status: SHIPPED | OUTPATIENT
Start: 2024-02-15

## 2024-03-11 RX ORDER — HYDROCHLOROTHIAZIDE 25 MG/1
25 TABLET ORAL EVERY MORNING
Qty: 90 TABLET | Refills: 1 | Status: SHIPPED | OUTPATIENT
Start: 2024-03-11

## 2024-03-18 ENCOUNTER — PATIENT MESSAGE (OUTPATIENT)
Dept: PRIMARY CARE CLINIC | Age: 84
End: 2024-03-18

## 2024-03-18 DIAGNOSIS — F41.0 PANIC ATTACKS: ICD-10-CM

## 2024-03-19 RX ORDER — ALPRAZOLAM 0.5 MG/1
0.5 TABLET ORAL 3 TIMES DAILY PRN
Qty: 90 TABLET | Refills: 0 | Status: SHIPPED | OUTPATIENT
Start: 2024-03-19 | End: 2024-04-18

## 2024-03-19 NOTE — TELEPHONE ENCOUNTER
From: Taryn Jane  To: Dr. Eusebia Claudio  Sent: 3/18/2024 5:41 PM CDT  Subject: Refill    I'm trying to request a refill on my alprololam can't find it in my medications list on here . Refill it for me please .Thank you

## 2024-05-02 ENCOUNTER — OFFICE VISIT (OUTPATIENT)
Dept: PRIMARY CARE CLINIC | Age: 84
End: 2024-05-02
Payer: MEDICARE

## 2024-05-02 VITALS
SYSTOLIC BLOOD PRESSURE: 124 MMHG | RESPIRATION RATE: 16 BRPM | BODY MASS INDEX: 29.19 KG/M2 | TEMPERATURE: 97.3 F | OXYGEN SATURATION: 98 % | HEART RATE: 71 BPM | HEIGHT: 64 IN | DIASTOLIC BLOOD PRESSURE: 78 MMHG | WEIGHT: 171 LBS

## 2024-05-02 DIAGNOSIS — I10 ESSENTIAL HYPERTENSION: ICD-10-CM

## 2024-05-02 DIAGNOSIS — Z00.00 MEDICARE ANNUAL WELLNESS VISIT, SUBSEQUENT: Primary | ICD-10-CM

## 2024-05-02 DIAGNOSIS — M51.37 DEGENERATION OF LUMBAR OR LUMBOSACRAL INTERVERTEBRAL DISC: ICD-10-CM

## 2024-05-02 DIAGNOSIS — E78.5 HYPERLIPIDEMIA, UNSPECIFIED HYPERLIPIDEMIA TYPE: ICD-10-CM

## 2024-05-02 DIAGNOSIS — F41.0 PANIC ATTACKS: ICD-10-CM

## 2024-05-02 LAB
ALBUMIN SERPL-MCNC: 4.1 G/DL (ref 3.5–5.2)
ALP SERPL-CCNC: 81 U/L (ref 35–104)
ALT SERPL-CCNC: 13 U/L (ref 5–33)
ANION GAP SERPL CALCULATED.3IONS-SCNC: 10 MMOL/L (ref 7–19)
AST SERPL-CCNC: 16 U/L (ref 5–32)
BASOPHILS # BLD: 0.1 K/UL (ref 0–0.2)
BASOPHILS NFR BLD: 0.9 % (ref 0–1)
BILIRUB SERPL-MCNC: <0.2 MG/DL (ref 0.2–1.2)
BUN SERPL-MCNC: 24 MG/DL (ref 8–23)
CALCIUM SERPL-MCNC: 9.7 MG/DL (ref 8.8–10.2)
CHLORIDE SERPL-SCNC: 105 MMOL/L (ref 98–111)
CHOLEST SERPL-MCNC: 158 MG/DL (ref 160–199)
CO2 SERPL-SCNC: 28 MMOL/L (ref 22–29)
CREAT SERPL-MCNC: 0.9 MG/DL (ref 0.5–0.9)
EOSINOPHIL # BLD: 0.3 K/UL (ref 0–0.6)
EOSINOPHIL NFR BLD: 4.7 % (ref 0–5)
ERYTHROCYTE [DISTWIDTH] IN BLOOD BY AUTOMATED COUNT: 14.2 % (ref 11.5–14.5)
GLUCOSE SERPL-MCNC: 95 MG/DL (ref 74–109)
HCT VFR BLD AUTO: 42.8 % (ref 37–47)
HDLC SERPL-MCNC: 66 MG/DL (ref 65–121)
HGB BLD-MCNC: 13.5 G/DL (ref 12–16)
IMM GRANULOCYTES # BLD: 0 K/UL
LDLC SERPL CALC-MCNC: 69 MG/DL
LYMPHOCYTES # BLD: 1.4 K/UL (ref 1.1–4.5)
LYMPHOCYTES NFR BLD: 22.3 % (ref 20–40)
MCH RBC QN AUTO: 29.3 PG (ref 27–31)
MCHC RBC AUTO-ENTMCNC: 31.5 G/DL (ref 33–37)
MCV RBC AUTO: 92.8 FL (ref 81–99)
MONOCYTES # BLD: 0.5 K/UL (ref 0–0.9)
MONOCYTES NFR BLD: 8.1 % (ref 0–10)
NEUTROPHILS # BLD: 4 K/UL (ref 1.5–7.5)
NEUTS SEG NFR BLD: 63.7 % (ref 50–65)
PLATELET # BLD AUTO: 357 K/UL (ref 130–400)
PMV BLD AUTO: 10.1 FL (ref 9.4–12.3)
POTASSIUM SERPL-SCNC: 4.4 MMOL/L (ref 3.5–5)
PROT SERPL-MCNC: 6.7 G/DL (ref 6.6–8.7)
RBC # BLD AUTO: 4.61 M/UL (ref 4.2–5.4)
SODIUM SERPL-SCNC: 143 MMOL/L (ref 136–145)
TRIGL SERPL-MCNC: 117 MG/DL (ref 0–149)
WBC # BLD AUTO: 6.3 K/UL (ref 4.8–10.8)

## 2024-05-02 PROCEDURE — G8399 PT W/DXA RESULTS DOCUMENT: HCPCS | Performed by: FAMILY MEDICINE

## 2024-05-02 PROCEDURE — 3074F SYST BP LT 130 MM HG: CPT | Performed by: FAMILY MEDICINE

## 2024-05-02 PROCEDURE — 3078F DIAST BP <80 MM HG: CPT | Performed by: FAMILY MEDICINE

## 2024-05-02 PROCEDURE — 1090F PRES/ABSN URINE INCON ASSESS: CPT | Performed by: FAMILY MEDICINE

## 2024-05-02 PROCEDURE — G0439 PPPS, SUBSEQ VISIT: HCPCS | Performed by: FAMILY MEDICINE

## 2024-05-02 PROCEDURE — G8427 DOCREV CUR MEDS BY ELIG CLIN: HCPCS | Performed by: FAMILY MEDICINE

## 2024-05-02 PROCEDURE — 99214 OFFICE O/P EST MOD 30 MIN: CPT | Performed by: FAMILY MEDICINE

## 2024-05-02 PROCEDURE — G8417 CALC BMI ABV UP PARAM F/U: HCPCS | Performed by: FAMILY MEDICINE

## 2024-05-02 PROCEDURE — 1124F ACP DISCUSS-NO DSCNMKR DOCD: CPT | Performed by: FAMILY MEDICINE

## 2024-05-02 PROCEDURE — 1036F TOBACCO NON-USER: CPT | Performed by: FAMILY MEDICINE

## 2024-05-02 RX ORDER — NALOXONE HYDROCHLORIDE 4 MG/.1ML
1 SPRAY NASAL PRN
Qty: 2 EACH | Refills: 2 | Status: SHIPPED | OUTPATIENT
Start: 2024-05-02

## 2024-05-02 RX ORDER — ALPRAZOLAM 0.5 MG/1
0.5 TABLET ORAL 3 TIMES DAILY PRN
Qty: 90 TABLET | Refills: 0 | Status: SHIPPED | OUTPATIENT
Start: 2024-05-02 | End: 2024-06-01

## 2024-05-02 SDOH — ECONOMIC STABILITY: FOOD INSECURITY: WITHIN THE PAST 12 MONTHS, THE FOOD YOU BOUGHT JUST DIDN'T LAST AND YOU DIDN'T HAVE MONEY TO GET MORE.: NEVER TRUE

## 2024-05-02 SDOH — ECONOMIC STABILITY: FOOD INSECURITY: WITHIN THE PAST 12 MONTHS, YOU WORRIED THAT YOUR FOOD WOULD RUN OUT BEFORE YOU GOT MONEY TO BUY MORE.: NEVER TRUE

## 2024-05-02 SDOH — ECONOMIC STABILITY: INCOME INSECURITY: HOW HARD IS IT FOR YOU TO PAY FOR THE VERY BASICS LIKE FOOD, HOUSING, MEDICAL CARE, AND HEATING?: NOT HARD AT ALL

## 2024-05-02 ASSESSMENT — PATIENT HEALTH QUESTIONNAIRE - PHQ9
SUM OF ALL RESPONSES TO PHQ QUESTIONS 1-9: 0
2. FEELING DOWN, DEPRESSED OR HOPELESS: NOT AT ALL
1. LITTLE INTEREST OR PLEASURE IN DOING THINGS: NOT AT ALL
SUM OF ALL RESPONSES TO PHQ QUESTIONS 1-9: 0
SUM OF ALL RESPONSES TO PHQ9 QUESTIONS 1 & 2: 0

## 2024-05-02 ASSESSMENT — LIFESTYLE VARIABLES
HOW OFTEN DO YOU HAVE A DRINK CONTAINING ALCOHOL: NEVER
HOW MANY STANDARD DRINKS CONTAINING ALCOHOL DO YOU HAVE ON A TYPICAL DAY: PATIENT DOES NOT DRINK

## 2024-05-02 NOTE — PROGRESS NOTES
Medicare Annual Wellness Visit    Taryn Jane is here for Medicare AWV (Doing well. No complaints at this time.)    Assessment & Plan   Medicare annual wellness visit, subsequent  Panic attacks  -     ALPRAZolam (XANAX) 0.5 MG tablet; Take 1 tablet by mouth 3 times daily as needed for Anxiety for up to 30 days., Disp-90 tablet, R-0Normal  -     CBC with Auto Differential  -     Comprehensive Metabolic Panel  -     Lipid Panel  Hyperlipidemia, unspecified hyperlipidemia type  -     CBC with Auto Differential  -     Comprehensive Metabolic Panel  -     Lipid Panel  Essential hypertension  -     CBC with Auto Differential  -     Comprehensive Metabolic Panel  -     Lipid Panel  Degeneration of lumbar or lumbosacral intervertebral disc  Labs ordered today.  Will notify of results.  Xanax was refilled today to the pharmacy.  PDMP was reviewed and updated.  Continue to follow with Dr. Dang regarding her chronic back pain.  Continue current blood pressure medications as blood pressure is well-controlled in office today.  Follow-up with us in 6 months for checkup unless needed sooner.  Recommendations for Preventive Services Due: see orders and patient instructions/AVS.  Recommended screening schedule for the next 5-10 years is provided to the patient in written form: see Patient Instructions/AVS.     Return in 6 months (on 11/2/2024).     Subjective   The following acute and/or chronic problems were also addressed today:  Patient is seen today for Medicare annual wellness but also has a few chronic problems that we follow including her anxiety.  She does need a refill on her Xanax today which she has been on for a very long time.  This works well for her.  She has tried to come off of it but states that she was not able to come off of it completely.  She does just take it as needed.    She states that she does see Dr. Dang's office for her chronic back pain.  She states that she does receive injections from

## 2024-06-03 DIAGNOSIS — F41.0 PANIC ATTACKS: ICD-10-CM

## 2024-06-03 RX ORDER — CITALOPRAM 40 MG/1
40 TABLET ORAL DAILY
Qty: 90 TABLET | Refills: 1 | Status: SHIPPED | OUTPATIENT
Start: 2024-06-03

## 2024-09-05 DIAGNOSIS — F41.0 PANIC ATTACKS: ICD-10-CM

## 2024-09-08 DIAGNOSIS — F41.0 PANIC ATTACKS: ICD-10-CM

## 2024-09-09 RX ORDER — CITALOPRAM HYDROBROMIDE 40 MG/1
40 TABLET ORAL DAILY
Qty: 90 TABLET | Refills: 1 | Status: SHIPPED | OUTPATIENT
Start: 2024-09-09

## 2024-09-09 RX ORDER — ALPRAZOLAM 0.5 MG
0.5 TABLET ORAL 3 TIMES DAILY PRN
Qty: 90 TABLET | Refills: 0 | Status: SHIPPED | OUTPATIENT
Start: 2024-09-09 | End: 2024-10-09

## 2024-10-25 RX ORDER — ATORVASTATIN CALCIUM 40 MG/1
40 TABLET, FILM COATED ORAL DAILY
Qty: 90 TABLET | Refills: 3 | Status: SHIPPED | OUTPATIENT
Start: 2024-10-25

## 2024-11-04 RX ORDER — HYDROCHLOROTHIAZIDE 25 MG/1
25 TABLET ORAL EVERY MORNING
Qty: 90 TABLET | Refills: 1 | Status: SHIPPED | OUTPATIENT
Start: 2024-11-04

## 2024-11-04 RX ORDER — DULOXETIN HYDROCHLORIDE 60 MG/1
CAPSULE, DELAYED RELEASE ORAL
Qty: 90 CAPSULE | Refills: 3 | Status: SHIPPED | OUTPATIENT
Start: 2024-11-04

## 2024-11-21 DIAGNOSIS — F41.0 PANIC ATTACKS: ICD-10-CM

## 2024-11-21 RX ORDER — ALPRAZOLAM 0.5 MG
0.5 TABLET ORAL 3 TIMES DAILY PRN
Qty: 90 TABLET | Refills: 0 | OUTPATIENT
Start: 2024-11-21 | End: 2024-12-21

## 2024-11-22 RX ORDER — MELOXICAM 15 MG/1
15 TABLET ORAL DAILY
Qty: 90 TABLET | Refills: 2 | Status: SHIPPED | OUTPATIENT
Start: 2024-11-22

## 2024-11-27 ENCOUNTER — OFFICE VISIT (OUTPATIENT)
Dept: PRIMARY CARE CLINIC | Age: 84
End: 2024-11-27

## 2024-11-27 VITALS
OXYGEN SATURATION: 98 % | WEIGHT: 169 LBS | HEIGHT: 64 IN | HEART RATE: 72 BPM | BODY MASS INDEX: 28.85 KG/M2 | DIASTOLIC BLOOD PRESSURE: 76 MMHG | SYSTOLIC BLOOD PRESSURE: 110 MMHG | TEMPERATURE: 98.3 F | RESPIRATION RATE: 20 BRPM

## 2024-11-27 DIAGNOSIS — R30.0 DYSURIA: Primary | ICD-10-CM

## 2024-11-27 DIAGNOSIS — F41.0 PANIC ATTACKS: ICD-10-CM

## 2024-11-27 DIAGNOSIS — F41.9 ANXIETY: ICD-10-CM

## 2024-11-27 DIAGNOSIS — Z51.81 MEDICATION MONITORING ENCOUNTER: ICD-10-CM

## 2024-11-27 LAB
APPEARANCE FLUID: CLEAR
BILIRUBIN, POC: NORMAL
BLOOD URINE, POC: NORMAL
CLARITY, POC: CLEAR
COLOR, POC: YELLOW
GLUCOSE URINE, POC: NORMAL MG/DL
KETONES, POC: NORMAL MG/DL
LEUKOCYTE EST, POC: NORMAL
NITRITE, POC: NORMAL
PH, POC: 6
PROTEIN, POC: NORMAL MG/DL
SPECIFIC GRAVITY, POC: >=1.03
UROBILINOGEN, POC: 1 MG/DL

## 2024-11-27 RX ORDER — CITALOPRAM HYDROBROMIDE 40 MG/1
40 TABLET ORAL DAILY
Qty: 90 TABLET | Refills: 1 | Status: SHIPPED | OUTPATIENT
Start: 2024-11-27

## 2024-11-27 RX ORDER — ALPRAZOLAM 0.5 MG
0.5 TABLET ORAL 3 TIMES DAILY PRN
Qty: 90 TABLET | Refills: 0 | Status: SHIPPED | OUTPATIENT
Start: 2024-11-27 | End: 2024-12-27

## 2024-11-27 RX ORDER — NITROFURANTOIN 25; 75 MG/1; MG/1
100 CAPSULE ORAL 2 TIMES DAILY
Qty: 20 CAPSULE | Refills: 0 | Status: SHIPPED | OUTPATIENT
Start: 2024-11-27 | End: 2024-12-04

## 2024-11-27 ASSESSMENT — ENCOUNTER SYMPTOMS
NAUSEA: 0
EYE DISCHARGE: 0
BACK PAIN: 0
VOMITING: 0
COUGH: 0
DIARRHEA: 0
COLOR CHANGE: 0
WHEEZING: 0
ABDOMINAL PAIN: 0

## 2024-11-27 NOTE — PROGRESS NOTES
MG tablet; Take 1 tablet by mouth 3 times daily as needed for Anxiety for up to 30 days., Disp-90 tablet, R-0Normal  -     citalopram (CELEXA) 40 MG tablet; Take 1 tablet by mouth daily, Disp-90 tablet, R-1Normal  -     POCT Rapid Drug Screen  3. Anxiety  -     POCT Rapid Drug Screen  4. Medication monitoring encounter  -     POCT Rapid Drug Screen       Xanax was refilled today.  PDMP was reviewed and updated.  Medication agreement drug screen were updated as well.  Urine was obtained today and was positive so antibiotics were sent to the pharmacy.  Urine will be sent for culture and we will notify her of the results of that we receive it back.  Follow-up with us in 6 months for checkup as long she is doing well but if she needs a sooner she is to let us know    PDMP Monitoring:    Last PDMP [place] as Reviewed ([place]):  Review User Review Instant Review Result   CHRIS GARG 11/27/2024 11:10 AM Reviewed PDMP [1]       Urine Drug Screenings (1 yr)       POCT Rapid Drug Screen  Resulted: 11/13/2015 10:45 AM (Final result)              POCT Rapid Drug Screen  Collected: 8/11/2015 11:24 AM (Final result)                  Medication Contract and Consent for Opioid Use Documents Filed       Patient Documents       Type of Document Status Date Received Received By Description    Medication Contract [Status Missing]       Medication Contract Signed 10/4/2017 11:56 AM VINICIUS GOULD     Medication Contract Received 11/27/2024 10:57 AM [facility] controlled substance medication agreement 11-27-24                     EMR Dragon/transcription disclaimer:  Much of this encounter note is electronic transcription/translation of spoken language toprinted texts.  The electronic translation of spoken language may be erroneous, or at times, nonsensical words or phrases may be inadvertently transcribed.  Although I have reviewed the note for such errors, some may stillexist.

## 2024-11-29 LAB
BACTERIA UR CULT: ABNORMAL
ORGANISM: ABNORMAL
ORGANISM: ABNORMAL

## 2024-12-05 RX ORDER — MIRABEGRON 50 MG/1
50 TABLET, FILM COATED, EXTENDED RELEASE ORAL DAILY
Qty: 30 TABLET | Refills: 5 | Status: SHIPPED | OUTPATIENT
Start: 2024-12-05

## 2025-01-22 RX ORDER — POTASSIUM CHLORIDE 1500 MG/1
TABLET, EXTENDED RELEASE ORAL
Qty: 90 TABLET | Refills: 3 | Status: SHIPPED | OUTPATIENT
Start: 2025-01-22

## 2025-01-31 RX ORDER — LOSARTAN POTASSIUM 100 MG/1
100 TABLET ORAL DAILY
Qty: 90 TABLET | Refills: 3 | Status: SHIPPED | OUTPATIENT
Start: 2025-01-31

## 2025-02-04 DIAGNOSIS — F41.0 PANIC ATTACKS: ICD-10-CM

## 2025-02-04 RX ORDER — ALPRAZOLAM 0.5 MG
0.5 TABLET ORAL 3 TIMES DAILY PRN
Qty: 90 TABLET | Refills: 0 | Status: SHIPPED | OUTPATIENT
Start: 2025-02-04 | End: 2025-03-06

## 2025-02-04 RX ORDER — INDAPAMIDE 2.5 MG/1
TABLET ORAL
Qty: 180 TABLET | Refills: 3 | Status: SHIPPED | OUTPATIENT
Start: 2025-02-04

## 2025-02-04 RX ORDER — HYDROCHLOROTHIAZIDE 25 MG/1
25 TABLET ORAL EVERY MORNING
Qty: 90 TABLET | Refills: 1 | Status: SHIPPED | OUTPATIENT
Start: 2025-02-04

## 2025-03-25 ENCOUNTER — HOSPITAL ENCOUNTER (OUTPATIENT)
Dept: GENERAL RADIOLOGY | Facility: HOSPITAL | Age: 85
Discharge: HOME | End: 2025-03-25
Admitting: NURSE PRACTITIONER
Payer: MEDICARE

## 2025-03-25 ENCOUNTER — TRANSCRIBE ORDERS (OUTPATIENT)
Dept: ADMINISTRATIVE | Facility: HOSPITAL | Age: 85
End: 2025-03-25
Payer: OTHER GOVERNMENT

## 2025-03-25 DIAGNOSIS — M25.519 SHOULDER PAIN, UNSPECIFIED CHRONICITY, UNSPECIFIED LATERALITY: ICD-10-CM

## 2025-03-25 DIAGNOSIS — M25.519 SHOULDER PAIN, UNSPECIFIED CHRONICITY, UNSPECIFIED LATERALITY: Primary | ICD-10-CM

## 2025-03-25 PROCEDURE — 73030 X-RAY EXAM OF SHOULDER: CPT

## 2025-04-04 DIAGNOSIS — F41.0 PANIC ATTACKS: ICD-10-CM

## 2025-04-07 RX ORDER — ALPRAZOLAM 0.5 MG
0.5 TABLET ORAL 3 TIMES DAILY PRN
Qty: 90 TABLET | Refills: 0 | Status: SHIPPED | OUTPATIENT
Start: 2025-04-07 | End: 2025-05-07

## 2025-04-07 NOTE — TELEPHONE ENCOUNTER
Provider needs to review PDMP    PDMP Monitoring:    Last PDMP [place] as Reviewed ([place]):  Review User Review Instant Review Result   CHRIS GARG 2/4/2025  3:45 PM Reviewed PDMP [1]       Last office visit for requested medication : 11-27-24  Next office visit : 5/28/2025     Last UDS:   Benzodiazepine Screen, Urine   Date Value Ref Range Status   11/13/2015 neg  Final     Cocaine Metabolite Screen, Urine   Date Value Ref Range Status   11/13/2015 neg  Final     Oxycodone Screen, Ur   Date Value Ref Range Status   11/13/2015 neg  Final     Propoxyphene Screen, Urine   Date Value Ref Range Status   11/13/2015 neg  Final     Tricyclic Antidepressants, Urine   Date Value Ref Range Status   11/13/2015 neg  Final       Medication Contract and Consent for Opioid Use Documents Filed       Patient Documents       Type of Document Status Date Received Received By Description    Medication Contract [Status Missing]       Medication Contract Signed 10/4/2017 11:56 AM VINICIUS GOULD     Medication Contract Received 11/27/2024 10:57 AM [facility] controlled substance medication agreement 11-27-24                    Requested Prescriptions     Pending Prescriptions Disp Refills    ALPRAZolam (XANAX) 0.5 MG tablet [Pharmacy Med Name: ALPRAZOLAM 0.5 MG TABLET] 90 tablet 0     Sig: Take 1 tablet by mouth 3 times daily as needed for Anxiety for up to 30 days.         Please approve or refuse this medication.   Celia Lynn MA

## 2025-04-14 RX ORDER — FESOTERODINE FUMARATE 4 MG/1
4 TABLET, FILM COATED, EXTENDED RELEASE ORAL DAILY
Qty: 90 TABLET | Refills: 3 | Status: SHIPPED | OUTPATIENT
Start: 2025-04-14

## 2025-05-28 ENCOUNTER — OFFICE VISIT (OUTPATIENT)
Dept: PRIMARY CARE CLINIC | Age: 85
End: 2025-05-28
Payer: MEDICARE

## 2025-05-28 VITALS
DIASTOLIC BLOOD PRESSURE: 78 MMHG | WEIGHT: 164 LBS | BODY MASS INDEX: 28 KG/M2 | TEMPERATURE: 97.8 F | HEART RATE: 101 BPM | SYSTOLIC BLOOD PRESSURE: 128 MMHG | RESPIRATION RATE: 18 BRPM | OXYGEN SATURATION: 96 % | HEIGHT: 64 IN

## 2025-05-28 DIAGNOSIS — Z91.81 AT HIGH RISK FOR FALLS: ICD-10-CM

## 2025-05-28 DIAGNOSIS — Z00.00 MEDICARE ANNUAL WELLNESS VISIT, SUBSEQUENT: Primary | ICD-10-CM

## 2025-05-28 DIAGNOSIS — I10 ESSENTIAL HYPERTENSION: ICD-10-CM

## 2025-05-28 DIAGNOSIS — F41.0 PANIC ATTACKS: ICD-10-CM

## 2025-05-28 DIAGNOSIS — F41.9 ANXIETY: ICD-10-CM

## 2025-05-28 DIAGNOSIS — Z79.899 MEDICATION MANAGEMENT: ICD-10-CM

## 2025-05-28 DIAGNOSIS — E78.5 HYPERLIPIDEMIA, UNSPECIFIED HYPERLIPIDEMIA TYPE: ICD-10-CM

## 2025-05-28 LAB
ALCOHOL URINE: NORMAL
AMPHETAMINE SCREEN URINE: NORMAL
BARBITURATE SCREEN URINE: NORMAL
BENZODIAZEPINE SCREEN, URINE: NORMAL
BUPRENORPHINE URINE: NORMAL
COCAINE METABOLITE SCREEN URINE: NORMAL
FENTANYL SCREEN, URINE: NORMAL
GABAPENTIN SCREEN, URINE: NORMAL
MDMA, URINE: NORMAL
METHADONE SCREEN, URINE: NORMAL
METHAMPHETAMINE, URINE: NORMAL
OPIATE SCREEN URINE: NORMAL
OXYCODONE SCREEN URINE: NORMAL
PHENCYCLIDINE SCREEN URINE: NORMAL
PROPOXYPHENE SCREEN, URINE: NORMAL
SYNTHETIC CANNABINOIDS(K2) SCREEN, URINE: NORMAL
THC SCREEN, URINE: NORMAL
TRAMADOL SCREEN URINE: NORMAL
TRICYCLIC ANTIDEPRESSANTS, UR: NORMAL

## 2025-05-28 PROCEDURE — 1036F TOBACCO NON-USER: CPT | Performed by: FAMILY MEDICINE

## 2025-05-28 PROCEDURE — 3078F DIAST BP <80 MM HG: CPT | Performed by: FAMILY MEDICINE

## 2025-05-28 PROCEDURE — G8417 CALC BMI ABV UP PARAM F/U: HCPCS | Performed by: FAMILY MEDICINE

## 2025-05-28 PROCEDURE — 1160F RVW MEDS BY RX/DR IN RCRD: CPT | Performed by: FAMILY MEDICINE

## 2025-05-28 PROCEDURE — 1159F MED LIST DOCD IN RCRD: CPT | Performed by: FAMILY MEDICINE

## 2025-05-28 PROCEDURE — 99214 OFFICE O/P EST MOD 30 MIN: CPT | Performed by: FAMILY MEDICINE

## 2025-05-28 PROCEDURE — 80305 DRUG TEST PRSMV DIR OPT OBS: CPT | Performed by: FAMILY MEDICINE

## 2025-05-28 PROCEDURE — G8399 PT W/DXA RESULTS DOCUMENT: HCPCS | Performed by: FAMILY MEDICINE

## 2025-05-28 PROCEDURE — 1124F ACP DISCUSS-NO DSCNMKR DOCD: CPT | Performed by: FAMILY MEDICINE

## 2025-05-28 PROCEDURE — 3074F SYST BP LT 130 MM HG: CPT | Performed by: FAMILY MEDICINE

## 2025-05-28 PROCEDURE — G8427 DOCREV CUR MEDS BY ELIG CLIN: HCPCS | Performed by: FAMILY MEDICINE

## 2025-05-28 PROCEDURE — 1090F PRES/ABSN URINE INCON ASSESS: CPT | Performed by: FAMILY MEDICINE

## 2025-05-28 PROCEDURE — G0439 PPPS, SUBSEQ VISIT: HCPCS | Performed by: FAMILY MEDICINE

## 2025-05-28 RX ORDER — ALPRAZOLAM 0.5 MG
0.5 TABLET ORAL 3 TIMES DAILY PRN
Qty: 90 TABLET | Refills: 0 | Status: SHIPPED | OUTPATIENT
Start: 2025-05-28 | End: 2025-06-27

## 2025-05-28 SDOH — ECONOMIC STABILITY: FOOD INSECURITY: WITHIN THE PAST 12 MONTHS, YOU WORRIED THAT YOUR FOOD WOULD RUN OUT BEFORE YOU GOT MONEY TO BUY MORE.: NEVER TRUE

## 2025-05-28 SDOH — ECONOMIC STABILITY: FOOD INSECURITY: WITHIN THE PAST 12 MONTHS, THE FOOD YOU BOUGHT JUST DIDN'T LAST AND YOU DIDN'T HAVE MONEY TO GET MORE.: NEVER TRUE

## 2025-05-28 ASSESSMENT — PATIENT HEALTH QUESTIONNAIRE - PHQ9
SUM OF ALL RESPONSES TO PHQ QUESTIONS 1-9: 0
1. LITTLE INTEREST OR PLEASURE IN DOING THINGS: NOT AT ALL
SUM OF ALL RESPONSES TO PHQ QUESTIONS 1-9: 0
2. FEELING DOWN, DEPRESSED OR HOPELESS: NOT AT ALL
SUM OF ALL RESPONSES TO PHQ QUESTIONS 1-9: 0
SUM OF ALL RESPONSES TO PHQ QUESTIONS 1-9: 0

## 2025-05-28 NOTE — PROGRESS NOTES
Medicare Annual Wellness Visit    Taryn Jane is here for Medicare AWV    Assessment & Plan  1. Medicare wellness visit.  - Due for Medicare wellness examination.  - Comprehensive blood work panel will be ordered to ensure all health parameters are within normal limits.  - Physical examination reveals normal respiratory function without chest pain or palpitations.  - Luz will assist in completing the Medicare wellness questionnaire.    2. Medication management.  - Prescription refill for Xanax has been sent to the pharmacy.  - No recent changes in medications, surgeries, or medical history.  - No new problems or concerns reported.  - Blood pressure and other vital signs will be obtained during the visit.  Medicare annual wellness visit, subsequent  Panic attacks  -     ALPRAZolam (XANAX) 0.5 MG tablet; Take 1 tablet by mouth 3 times daily as needed for Anxiety for up to 30 days., Disp-90 tablet, R-0Normal  Medication management  -     POCT Rapid Drug Screen  Hyperlipidemia, unspecified hyperlipidemia type  At high risk for falls  Essential hypertension  Anxiety    Results       Return in 6 months (on 11/28/2025).     Subjective     History of Present Illness  The patient presents for a Medicare wellness visit.    She reports no significant changes in her health status over the past few months, with her medical history remaining consistent. She is feeling generally well, although she mentions having her days. There are no current issues or concerns she wishes to address. She consumed a piece of toast earlier this morning. She reports no chest pain or palpitations.  She is taking Xanax for her anxiety.  She does need a refill on this today.  Blood pressure is well-controlled in office today.    Patient's complete Health Risk Assessment and screening values have been reviewed and are found in Flowsheets. The following problems were reviewed today and where indicated follow up appointments were made and/or

## 2025-07-16 RX ORDER — MELOXICAM 15 MG/1
15 TABLET ORAL DAILY
Qty: 90 TABLET | Refills: 2 | Status: SHIPPED | OUTPATIENT
Start: 2025-07-16

## 2025-07-28 DIAGNOSIS — F41.0 PANIC ATTACKS: ICD-10-CM

## 2025-07-29 RX ORDER — ALPRAZOLAM 0.5 MG
0.5 TABLET ORAL 3 TIMES DAILY PRN
Qty: 90 TABLET | Refills: 0 | Status: SHIPPED | OUTPATIENT
Start: 2025-07-29 | End: 2025-08-28

## 2025-07-29 NOTE — TELEPHONE ENCOUNTER
PDMP Monitoring:    Last PDMP [place] as Reviewed ([place]):  Review User Review Instant Review Result   CHRIS GARG 5/28/2025  2:16 PM Reviewed PDMP [1]       Last office visit for requested medication : 5/28/2025    Next office visit : Visit date not found     Last UDS:   Benzodiazepine Screen, Urine   Date Value Ref Range Status   05/28/2025 pos  Final     Comment:     alprazolam     Buprenorphine Urine   Date Value Ref Range Status   05/28/2025 neg  Final     Cocaine Metabolite Screen, Urine   Date Value Ref Range Status   05/28/2025 neg  Final     Gabapentin Screen, Urine   Date Value Ref Range Status   05/28/2025 n/a  Final     Oxycodone Screen, Ur   Date Value Ref Range Status   05/28/2025 neg  Final     Propoxyphene Screen, Urine   Date Value Ref Range Status   05/28/2025 neg  Final     THC Screen, Urine   Date Value Ref Range Status   05/28/2025 neg  Final     Tricyclic Antidepressants, Urine   Date Value Ref Range Status   05/28/2025 neg  Final       Medication Contract and Consent for Opioid Use Documents Filed       Patient Documents       Type of Document Status Date Received Received By Description    Medication Contract [Status Missing]       Medication Contract Signed 10/4/2017 11:56 AM VINICIUS GOULD     Medication Contract Received 11/27/2024 10:57 AM [facility] controlled substance medication agreement 11-27-24    Medication Contract Received 5/28/2025  1:57 PM SAMIR AZUL Medication Agreement  5-28-25                    Requested Prescriptions     Pending Prescriptions Disp Refills    ALPRAZolam (XANAX) 0.5 MG tablet [Pharmacy Med Name: ALPRAZOLAM 0.5 MG TABLET] 90 tablet 0     Sig: Take 1 tablet by mouth 3 times daily as needed for Anxiety for up to 30 days.         Please approve or refuse this medication.   Luz Cartagena MA

## 2025-08-16 ENCOUNTER — ANESTHESIA (OUTPATIENT)
Dept: PERIOP | Facility: HOSPITAL | Age: 85
End: 2025-08-16
Payer: MEDICARE

## 2025-08-16 ENCOUNTER — HOSPITAL ENCOUNTER (INPATIENT)
Facility: HOSPITAL | Age: 85
LOS: 2 days | Discharge: HOME | End: 2025-08-20
Attending: EMERGENCY MEDICINE | Admitting: STUDENT IN AN ORGANIZED HEALTH CARE EDUCATION/TRAINING PROGRAM
Payer: MEDICARE

## 2025-08-16 ENCOUNTER — APPOINTMENT (OUTPATIENT)
Dept: CT IMAGING | Facility: HOSPITAL | Age: 85
End: 2025-08-16
Payer: MEDICARE

## 2025-08-16 ENCOUNTER — ANESTHESIA EVENT (OUTPATIENT)
Dept: PERIOP | Facility: HOSPITAL | Age: 85
End: 2025-08-16
Payer: MEDICARE

## 2025-08-16 PROBLEM — K92.2 LOWER GI BLEEDING: Status: ACTIVE | Noted: 2025-08-16

## 2025-08-16 PROCEDURE — 25810000003 LACTATED RINGERS PER 1000 ML: Performed by: NURSE ANESTHETIST, CERTIFIED REGISTERED

## 2025-08-16 PROCEDURE — 25010000002 LIDOCAINE PF 2% 2 % SOLUTION: Performed by: NURSE ANESTHETIST, CERTIFIED REGISTERED

## 2025-08-16 PROCEDURE — 25010000002 PROPOFOL 10 MG/ML EMULSION: Performed by: NURSE ANESTHETIST, CERTIFIED REGISTERED

## 2025-08-16 RX ORDER — LIDOCAINE HYDROCHLORIDE 20 MG/ML
INJECTION, SOLUTION EPIDURAL; INFILTRATION; INTRACAUDAL; PERINEURAL AS NEEDED
Status: DISCONTINUED | OUTPATIENT
Start: 2025-08-16 | End: 2025-08-16 | Stop reason: SURG

## 2025-08-16 RX ORDER — PROPOFOL 10 MG/ML
VIAL (ML) INTRAVENOUS AS NEEDED
Status: DISCONTINUED | OUTPATIENT
Start: 2025-08-16 | End: 2025-08-16 | Stop reason: SURG

## 2025-08-16 RX ORDER — SODIUM CHLORIDE, SODIUM LACTATE, POTASSIUM CHLORIDE, CALCIUM CHLORIDE 600; 310; 30; 20 MG/100ML; MG/100ML; MG/100ML; MG/100ML
INJECTION, SOLUTION INTRAVENOUS CONTINUOUS PRN
Status: DISCONTINUED | OUTPATIENT
Start: 2025-08-16 | End: 2025-08-16 | Stop reason: SURG

## 2025-08-16 RX ADMIN — PROPOFOL 100 MCG/KG/MIN: 10 INJECTION, EMULSION INTRAVENOUS at 20:03

## 2025-08-16 RX ADMIN — SODIUM CHLORIDE, POTASSIUM CHLORIDE, SODIUM LACTATE AND CALCIUM CHLORIDE: 600; 310; 30; 20 INJECTION, SOLUTION INTRAVENOUS at 19:51

## 2025-08-16 RX ADMIN — PROPOFOL 50 MG: 10 INJECTION, EMULSION INTRAVENOUS at 19:57

## 2025-08-16 RX ADMIN — LIDOCAINE HYDROCHLORIDE 80 MG: 20 INJECTION, SOLUTION EPIDURAL; INFILTRATION; INTRACAUDAL; PERINEURAL at 19:56

## 2025-08-16 RX ADMIN — PROPOFOL 50 MG: 10 INJECTION, EMULSION INTRAVENOUS at 20:01

## 2025-08-16 RX ADMIN — PROPOFOL 50 MG: 10 INJECTION, EMULSION INTRAVENOUS at 19:56

## 2025-08-16 RX ADMIN — PROPOFOL 50 MG: 10 INJECTION, EMULSION INTRAVENOUS at 19:59

## 2025-08-18 ENCOUNTER — ANESTHESIA (OUTPATIENT)
Dept: GASTROENTEROLOGY | Facility: HOSPITAL | Age: 85
End: 2025-08-18
Payer: MEDICARE

## 2025-08-18 ENCOUNTER — ANESTHESIA EVENT (OUTPATIENT)
Dept: GASTROENTEROLOGY | Facility: HOSPITAL | Age: 85
End: 2025-08-18
Payer: MEDICARE

## 2025-08-18 PROBLEM — K92.2 LOWER GI BLEED: Status: ACTIVE | Noted: 2025-08-18

## 2025-08-18 PROCEDURE — 25010000002 LIDOCAINE PF 2% 2 % SOLUTION: Performed by: NURSE ANESTHETIST, CERTIFIED REGISTERED

## 2025-08-18 PROCEDURE — 25010000002 PROPOFOL 10 MG/ML EMULSION: Performed by: NURSE ANESTHETIST, CERTIFIED REGISTERED

## 2025-08-18 RX ORDER — LIDOCAINE HYDROCHLORIDE 20 MG/ML
INJECTION, SOLUTION EPIDURAL; INFILTRATION; INTRACAUDAL; PERINEURAL AS NEEDED
Status: DISCONTINUED | OUTPATIENT
Start: 2025-08-18 | End: 2025-08-18 | Stop reason: SURG

## 2025-08-18 RX ORDER — PROPOFOL 10 MG/ML
VIAL (ML) INTRAVENOUS AS NEEDED
Status: DISCONTINUED | OUTPATIENT
Start: 2025-08-18 | End: 2025-08-18 | Stop reason: SURG

## 2025-08-18 RX ADMIN — LIDOCAINE HYDROCHLORIDE 50 MG: 20 INJECTION, SOLUTION EPIDURAL; INFILTRATION; INTRACAUDAL; PERINEURAL at 12:05

## 2025-08-18 RX ADMIN — PROPOFOL 50 MG: 10 INJECTION, EMULSION INTRAVENOUS at 12:24

## 2025-08-18 RX ADMIN — PROPOFOL 50 MG: 10 INJECTION, EMULSION INTRAVENOUS at 12:06

## 2025-08-18 RX ADMIN — PROPOFOL 30 MG: 10 INJECTION, EMULSION INTRAVENOUS at 12:19

## 2025-08-18 RX ADMIN — PROPOFOL 70 MG: 10 INJECTION, EMULSION INTRAVENOUS at 12:10

## 2025-08-18 RX ADMIN — PROPOFOL 50 MG: 10 INJECTION, EMULSION INTRAVENOUS at 12:14

## 2025-08-18 RX ADMIN — PROPOFOL 20 MG: 10 INJECTION, EMULSION INTRAVENOUS at 12:34

## 2025-08-18 RX ADMIN — PROPOFOL 50 MG: 10 INJECTION, EMULSION INTRAVENOUS at 12:30

## 2025-08-22 DIAGNOSIS — F41.0 PANIC ATTACKS: ICD-10-CM

## 2025-08-22 RX ORDER — CITALOPRAM HYDROBROMIDE 40 MG/1
40 TABLET ORAL DAILY
Qty: 90 TABLET | Refills: 1 | Status: SHIPPED | OUTPATIENT
Start: 2025-08-22

## 2025-09-04 ENCOUNTER — OFFICE VISIT (OUTPATIENT)
Dept: PRIMARY CARE CLINIC | Age: 85
End: 2025-09-04
Payer: MEDICARE

## 2025-09-04 VITALS
WEIGHT: 158 LBS | OXYGEN SATURATION: 98 % | SYSTOLIC BLOOD PRESSURE: 128 MMHG | BODY MASS INDEX: 26.98 KG/M2 | RESPIRATION RATE: 18 BRPM | TEMPERATURE: 97.9 F | HEIGHT: 64 IN | HEART RATE: 95 BPM | DIASTOLIC BLOOD PRESSURE: 72 MMHG

## 2025-09-04 DIAGNOSIS — K92.2 GASTROINTESTINAL HEMORRHAGE, UNSPECIFIED GASTROINTESTINAL HEMORRHAGE TYPE: Primary | ICD-10-CM

## 2025-09-04 DIAGNOSIS — Z09 HOSPITAL DISCHARGE FOLLOW-UP: ICD-10-CM

## 2025-09-04 LAB
ALBUMIN SERPL-MCNC: 3.9 G/DL (ref 3.5–5.2)
ALP SERPL-CCNC: 52 U/L (ref 35–104)
ALT SERPL-CCNC: 11 U/L (ref 10–35)
ANION GAP SERPL CALCULATED.3IONS-SCNC: 12 MMOL/L (ref 8–16)
AST SERPL-CCNC: 32 U/L (ref 10–35)
BASOPHILS # BLD: 0.1 K/UL (ref 0–0.2)
BASOPHILS NFR BLD: 1.1 % (ref 0–1)
BILIRUB SERPL-MCNC: 0.2 MG/DL (ref 0.2–1.2)
BUN SERPL-MCNC: 16 MG/DL (ref 8–23)
CALCIUM SERPL-MCNC: 9.7 MG/DL (ref 8.8–10.2)
CHLORIDE SERPL-SCNC: 98 MMOL/L (ref 98–107)
CO2 SERPL-SCNC: 25 MMOL/L (ref 22–29)
CREAT SERPL-MCNC: 0.7 MG/DL (ref 0.5–0.9)
EOSINOPHIL # BLD: 0.1 K/UL (ref 0–0.6)
EOSINOPHIL NFR BLD: 1.6 % (ref 0–5)
ERYTHROCYTE [DISTWIDTH] IN BLOOD BY AUTOMATED COUNT: 15.1 % (ref 11.5–14.5)
GLUCOSE SERPL-MCNC: 111 MG/DL (ref 70–99)
HCT VFR BLD AUTO: 30.2 % (ref 37–47)
HGB BLD-MCNC: 9.5 G/DL (ref 12–16)
IMM GRANULOCYTES # BLD: 0 K/UL
LYMPHOCYTES # BLD: 1 K/UL (ref 1.1–4.5)
LYMPHOCYTES NFR BLD: 23.6 % (ref 20–40)
MCH RBC QN AUTO: 27.9 PG (ref 27–31)
MCHC RBC AUTO-ENTMCNC: 31.5 G/DL (ref 33–37)
MCV RBC AUTO: 88.8 FL (ref 81–99)
MONOCYTES # BLD: 0.3 K/UL (ref 0–0.9)
MONOCYTES NFR BLD: 7.8 % (ref 0–10)
NEUTROPHILS # BLD: 2.9 K/UL (ref 1.5–7.5)
NEUTS SEG NFR BLD: 65.7 % (ref 50–65)
PLATELET # BLD AUTO: 458 K/UL (ref 130–400)
PMV BLD AUTO: 9.9 FL (ref 9.4–12.3)
POTASSIUM SERPL-SCNC: 3.8 MMOL/L (ref 3.5–5.1)
PROT SERPL-MCNC: 6.3 G/DL (ref 6.4–8.3)
RBC # BLD AUTO: 3.4 M/UL (ref 4.2–5.4)
SODIUM SERPL-SCNC: 135 MMOL/L (ref 136–145)
WBC # BLD AUTO: 4.4 K/UL (ref 4.8–10.8)

## 2025-09-04 PROCEDURE — 1159F MED LIST DOCD IN RCRD: CPT | Performed by: FAMILY MEDICINE

## 2025-09-04 PROCEDURE — 1090F PRES/ABSN URINE INCON ASSESS: CPT | Performed by: FAMILY MEDICINE

## 2025-09-04 PROCEDURE — 3078F DIAST BP <80 MM HG: CPT | Performed by: FAMILY MEDICINE

## 2025-09-04 PROCEDURE — 1124F ACP DISCUSS-NO DSCNMKR DOCD: CPT | Performed by: FAMILY MEDICINE

## 2025-09-04 PROCEDURE — G8399 PT W/DXA RESULTS DOCUMENT: HCPCS | Performed by: FAMILY MEDICINE

## 2025-09-04 PROCEDURE — 1036F TOBACCO NON-USER: CPT | Performed by: FAMILY MEDICINE

## 2025-09-04 PROCEDURE — 3074F SYST BP LT 130 MM HG: CPT | Performed by: FAMILY MEDICINE

## 2025-09-04 PROCEDURE — G8427 DOCREV CUR MEDS BY ELIG CLIN: HCPCS | Performed by: FAMILY MEDICINE

## 2025-09-04 PROCEDURE — 1111F DSCHRG MED/CURRENT MED MERGE: CPT | Performed by: FAMILY MEDICINE

## 2025-09-04 PROCEDURE — G8417 CALC BMI ABV UP PARAM F/U: HCPCS | Performed by: FAMILY MEDICINE

## 2025-09-04 PROCEDURE — 99214 OFFICE O/P EST MOD 30 MIN: CPT | Performed by: FAMILY MEDICINE

## 2025-09-04 RX ORDER — PANTOPRAZOLE SODIUM 40 MG/1
40 TABLET, DELAYED RELEASE ORAL
Qty: 60 TABLET | Refills: 0 | Status: SHIPPED | OUTPATIENT
Start: 2025-09-04 | End: 2025-10-04

## 2025-09-04 RX ORDER — PANTOPRAZOLE SODIUM 40 MG/1
40 TABLET, DELAYED RELEASE ORAL
COMMUNITY
Start: 2025-08-20 | End: 2025-09-04 | Stop reason: SDUPTHER

## 2025-09-04 RX ORDER — MULTIPLE VITAMINS W/ MINERALS TAB 9MG-400MCG
1 TAB ORAL DAILY
COMMUNITY

## 2025-09-04 RX ORDER — POLYETHYLENE GLYCOL 3350 17 G/17G
17 POWDER, FOR SOLUTION ORAL DAILY
Qty: 1530 G | Refills: 1 | Status: SHIPPED | OUTPATIENT
Start: 2025-09-04 | End: 2026-03-03

## 2025-09-04 SDOH — ECONOMIC STABILITY: FOOD INSECURITY: WITHIN THE PAST 12 MONTHS, THE FOOD YOU BOUGHT JUST DIDN'T LAST AND YOU DIDN'T HAVE MONEY TO GET MORE.: NEVER TRUE

## 2025-09-04 SDOH — ECONOMIC STABILITY: FOOD INSECURITY: WITHIN THE PAST 12 MONTHS, YOU WORRIED THAT YOUR FOOD WOULD RUN OUT BEFORE YOU GOT MONEY TO BUY MORE.: NEVER TRUE

## 2025-09-04 ASSESSMENT — PATIENT HEALTH QUESTIONNAIRE - PHQ9
SUM OF ALL RESPONSES TO PHQ QUESTIONS 1-9: 0
SUM OF ALL RESPONSES TO PHQ QUESTIONS 1-9: 0
1. LITTLE INTEREST OR PLEASURE IN DOING THINGS: NOT AT ALL
SUM OF ALL RESPONSES TO PHQ QUESTIONS 1-9: 0
2. FEELING DOWN, DEPRESSED OR HOPELESS: NOT AT ALL
SUM OF ALL RESPONSES TO PHQ QUESTIONS 1-9: 0